# Patient Record
Sex: FEMALE | Race: OTHER | Employment: UNEMPLOYED | URBAN - METROPOLITAN AREA
[De-identification: names, ages, dates, MRNs, and addresses within clinical notes are randomized per-mention and may not be internally consistent; named-entity substitution may affect disease eponyms.]

---

## 2018-11-02 ENCOUNTER — OFFICE VISIT (OUTPATIENT)
Dept: FAMILY MEDICINE CLINIC | Age: 33
End: 2018-11-02
Payer: COMMERCIAL

## 2018-11-02 VITALS
OXYGEN SATURATION: 98 % | TEMPERATURE: 98.3 F | BODY MASS INDEX: 26.31 KG/M2 | HEIGHT: 60 IN | RESPIRATION RATE: 16 BRPM | HEART RATE: 96 BPM | WEIGHT: 134 LBS | SYSTOLIC BLOOD PRESSURE: 118 MMHG | DIASTOLIC BLOOD PRESSURE: 72 MMHG

## 2018-11-02 DIAGNOSIS — M79.7 FIBROMYALGIA: Primary | ICD-10-CM

## 2018-11-02 DIAGNOSIS — Z3A.16 16 WEEKS GESTATION OF PREGNANCY: ICD-10-CM

## 2018-11-02 DIAGNOSIS — D21.9 FIBROMYOMA: Primary | ICD-10-CM

## 2018-11-02 DIAGNOSIS — F41.8 DEPRESSION WITH ANXIETY: ICD-10-CM

## 2018-11-02 DIAGNOSIS — M79.10 MYALGIA: ICD-10-CM

## 2018-11-02 LAB
BASOPHILS ABSOLUTE: 0 K/UL (ref 0–0.2)
BASOPHILS RELATIVE PERCENT: 0.2 %
EOSINOPHILS ABSOLUTE: 0.1 K/UL (ref 0–0.6)
EOSINOPHILS RELATIVE PERCENT: 1 %
HCT VFR BLD CALC: 36 % (ref 36–48)
HEMOGLOBIN: 12.2 G/DL (ref 12–16)
LYMPHOCYTES ABSOLUTE: 1.2 K/UL (ref 1–5.1)
LYMPHOCYTES RELATIVE PERCENT: 14.5 %
MCH RBC QN AUTO: 32.6 PG (ref 26–34)
MCHC RBC AUTO-ENTMCNC: 34 G/DL (ref 31–36)
MCV RBC AUTO: 95.7 FL (ref 80–100)
MONOCYTES ABSOLUTE: 0.5 K/UL (ref 0–1.3)
MONOCYTES RELATIVE PERCENT: 5.7 %
NEUTROPHILS ABSOLUTE: 6.3 K/UL (ref 1.7–7.7)
NEUTROPHILS RELATIVE PERCENT: 78.6 %
PDW BLD-RTO: 13 % (ref 12.4–15.4)
PLATELET # BLD: 253 K/UL (ref 135–450)
PMV BLD AUTO: 9.1 FL (ref 5–10.5)
RBC # BLD: 3.76 M/UL (ref 4–5.2)
SEDIMENTATION RATE, ERYTHROCYTE: 42 MM/HR (ref 0–20)
TOTAL CK: 102 U/L (ref 26–192)
WBC # BLD: 8 K/UL (ref 4–11)

## 2018-11-02 PROCEDURE — 99214 OFFICE O/P EST MOD 30 MIN: CPT | Performed by: FAMILY MEDICINE

## 2018-11-02 RX ORDER — FAMCICLOVIR 500 MG/1
TABLET, FILM COATED ORAL
Refills: 3 | COMMUNITY
Start: 2018-10-10 | End: 2019-01-07 | Stop reason: SDUPTHER

## 2018-11-02 ASSESSMENT — PATIENT HEALTH QUESTIONNAIRE - PHQ9
SUM OF ALL RESPONSES TO PHQ9 QUESTIONS 1 & 2: 0
SUM OF ALL RESPONSES TO PHQ QUESTIONS 1-9: 0
SUM OF ALL RESPONSES TO PHQ QUESTIONS 1-9: 0
1. LITTLE INTEREST OR PLEASURE IN DOING THINGS: 0
2. FEELING DOWN, DEPRESSED OR HOPELESS: 0

## 2018-11-02 NOTE — PROGRESS NOTES
no homicidal plans. Nursing note and vitals reviewed. Assessment:       Diagnosis Orders   1. Fibromyalgia  PT aquatic therapy    [facility]    CBC Auto Differential    Sedimentation rate, automated    CK   2. 16 weeks gestation of pregnancy     3. Depression with anxiety  [facility]   4. Myalgia  CK           Plan:      1. New patient,   History of poor response to cymbalta  Pt is 16 weeks pregnant  Referral to PT and rheumatology  Get labs    2. Continue fu with OB    3.  Declines medical treatment , referral to psycholog    Declines flu vaccine          Vale Dumas MD

## 2018-11-05 ASSESSMENT — ENCOUNTER SYMPTOMS
NAUSEA: 0
CHANGE IN BOWEL HABIT: 0
COUGH: 0
VISUAL CHANGE: 0
SORE THROAT: 0
SWOLLEN GLANDS: 0
VOMITING: 0
ABDOMINAL PAIN: 0

## 2019-01-04 ENCOUNTER — PATIENT MESSAGE (OUTPATIENT)
Dept: FAMILY MEDICINE CLINIC | Age: 34
End: 2019-01-04

## 2019-01-07 RX ORDER — FAMCICLOVIR 500 MG/1
TABLET, FILM COATED ORAL
Qty: 9 TABLET | Refills: 1 | Status: SHIPPED | OUTPATIENT
Start: 2019-01-07 | End: 2019-01-08 | Stop reason: SDUPTHER

## 2019-01-08 RX ORDER — FAMCICLOVIR 500 MG/1
TABLET, FILM COATED ORAL
Qty: 9 TABLET | Refills: 1 | Status: SHIPPED | OUTPATIENT
Start: 2019-01-08 | End: 2019-06-24

## 2019-06-10 ENCOUNTER — OFFICE VISIT (OUTPATIENT)
Dept: FAMILY MEDICINE CLINIC | Age: 34
End: 2019-06-10
Payer: COMMERCIAL

## 2019-06-10 VITALS
BODY MASS INDEX: 27.8 KG/M2 | WEIGHT: 141.6 LBS | TEMPERATURE: 97.6 F | RESPIRATION RATE: 16 BRPM | DIASTOLIC BLOOD PRESSURE: 80 MMHG | SYSTOLIC BLOOD PRESSURE: 148 MMHG | HEIGHT: 60 IN | OXYGEN SATURATION: 98 % | HEART RATE: 68 BPM

## 2019-06-10 DIAGNOSIS — G89.29 CHRONIC PELVIC PAIN IN FEMALE: ICD-10-CM

## 2019-06-10 DIAGNOSIS — R10.2 CHRONIC PELVIC PAIN IN FEMALE: ICD-10-CM

## 2019-06-10 DIAGNOSIS — R60.0 EDEMA OF BOTH LEGS: ICD-10-CM

## 2019-06-10 DIAGNOSIS — M79.7 FIBROMYALGIA: ICD-10-CM

## 2019-06-10 DIAGNOSIS — I10 BENIGN ESSENTIAL HTN: Primary | ICD-10-CM

## 2019-06-10 DIAGNOSIS — F41.8 DEPRESSION WITH ANXIETY: ICD-10-CM

## 2019-06-10 PROCEDURE — 99214 OFFICE O/P EST MOD 30 MIN: CPT | Performed by: FAMILY MEDICINE

## 2019-06-10 RX ORDER — HYDROCHLOROTHIAZIDE 25 MG/1
25 TABLET ORAL DAILY
Qty: 90 TABLET | Refills: 1 | Status: SHIPPED | OUTPATIENT
Start: 2019-06-10 | End: 2021-04-30

## 2019-06-10 RX ORDER — ACYCLOVIR 400 MG/1
TABLET ORAL
Refills: 2 | COMMUNITY
Start: 2019-04-15 | End: 2020-06-22 | Stop reason: SDUPTHER

## 2019-06-10 ASSESSMENT — PATIENT HEALTH QUESTIONNAIRE - PHQ9
1. LITTLE INTEREST OR PLEASURE IN DOING THINGS: 0
2. FEELING DOWN, DEPRESSED OR HOPELESS: 0
SUM OF ALL RESPONSES TO PHQ QUESTIONS 1-9: 0
SUM OF ALL RESPONSES TO PHQ QUESTIONS 1-9: 0
SUM OF ALL RESPONSES TO PHQ9 QUESTIONS 1 & 2: 0

## 2019-06-10 ASSESSMENT — ENCOUNTER SYMPTOMS
SHORTNESS OF BREATH: 0
ORTHOPNEA: 0
CHEST TIGHTNESS: 0
BLURRED VISION: 0
COLOR CHANGE: 0
BACK PAIN: 1

## 2019-06-10 NOTE — PROGRESS NOTES
Subjective:      Patient ID: Brian Ignacio is a 26 y.o. female. Here for fu  Dx on Nov with pre eclampsia, was tx with nifedipine and was dc few weeks ago, in her last apt her bp was\"high\"    . Hypertension   This is a new problem. The current episode started more than 1 month ago. The problem has been waxing and waning since onset. The problem is uncontrolled. Associated symptoms include anxiety, malaise/fatigue and peripheral edema. Pertinent negatives include no blurred vision, chest pain, headaches, neck pain, orthopnea, palpitations, PND, shortness of breath or sweats. There are no associated agents to hypertension. Past treatments include nothing. Fibromyalgia  Her pain is worse from waist down,   Constant  Has apt with rheumatology later this month    Depression w anxiety  Depressed mood is not so \"severe\" feels more stress with hre 3month old baby  \"no sleep\", \"I feel tired\", but denies si/hi  Good social support    Edema  Noticed since November, her sx had not resolved after delivering her daughter 2 months ago. Edema is worse in afternoon, worse w nothing, better with nothing  No redness but some times \"hurt\",   No sob, cp, palpitation, pnd or orthopnea. Pelvic pain  Was told by gyn to fu with us  Denies urinary sx  Onset months ago    Review of Systems   Constitutional: Positive for activity change, fatigue and malaise/fatigue. Negative for appetite change and unexpected weight change. Eyes: Negative for blurred vision. Respiratory: Negative for chest tightness and shortness of breath. Cardiovascular: Positive for leg swelling. Negative for chest pain, palpitations, orthopnea and PND. Musculoskeletal: Positive for back pain and myalgias. Negative for gait problem and neck pain. Skin: Negative for color change and pallor. Neurological: Negative for headaches. Psychiatric/Behavioral: Positive for dysphoric mood. Negative for behavioral problems and sleep disturbance.  The patient is nervous/anxious. has No Known Allergies. Current Outpatient Medications:     hydrochlorothiazide (HYDRODIURIL) 25 MG tablet, Take 1 tablet by mouth daily, Disp: 90 tablet, Rfl: 1    acyclovir (ZOVIRAX) 400 MG tablet, TAKE 1 TABLET (400 MG) BY ORAL ROUTE 2 TIMES PER DAY FOR 30 DAYS, Disp: , Rfl: 2    famciclovir (FAMVIR) 500 MG tablet, TAKE 3 TABS BY MOUTH ONCE FOR 1 DOSE., Disp: 9 tablet, Rfl: 1     has a past medical history of Depression, Fibromyalgia, and Preeclampsia. No past surgical history on file. reports that she has never smoked. She has never used smokeless tobacco.    family history is not sig       Objective:  Blood pressure (!) 148/80, pulse 68, temperature 97.6 °F (36.4 °C), temperature source Tympanic, resp. rate 16, height 5' (1.524 m), weight 141 lb 9.6 oz (64.2 kg), last menstrual period 07/09/2018, SpO2 98 %, not currently breastfeeding. Physical Exam   Constitutional: She is oriented to person, place, and time. She appears well-developed and well-nourished. No distress. HENT:   Head: Normocephalic. Mouth/Throat: Oropharynx is clear and moist.   Eyes: Pupils are equal, round, and reactive to light. Conjunctivae and EOM are normal. No scleral icterus. Neck: Normal range of motion. Neck supple. No JVD present. No thyromegaly present. No carotid bruits   Cardiovascular: Normal rate, regular rhythm, normal heart sounds and intact distal pulses. Exam reveals no gallop and no friction rub. No murmur heard. Pulses:       Carotid pulses are 2+ on the right side, and 2+ on the left side. No edema     Pulmonary/Chest: Effort normal and breath sounds normal. No respiratory distress. She has no wheezes. She has no rales. She exhibits no tenderness. Abdominal: Soft. Bowel sounds are normal. She exhibits no mass. There is no tenderness. Musculoskeletal: She exhibits no edema. Lymphadenopathy:     She has no cervical adenopathy.    Neurological: She is alert and oriented to person, place, and time. She has normal reflexes. No cranial nerve deficit. She exhibits normal muscle tone. Skin: Skin is warm. Capillary refill takes less than 2 seconds. She is not diaphoretic. Psychiatric: She has a normal mood and affect. Her behavior is normal. Thought content normal.   Nursing note and vitals reviewed. Assessment:       Diagnosis Orders   1. Benign essential HTN  hydrochlorothiazide (HYDRODIURIL) 25 MG tablet   2. Fibromyalgia  Therapuetic recreation aquatics   3. Depression with anxiety     4. Edema of both legs  hydrochlorothiazide (HYDRODIURIL) 25 MG tablet   5. Chronic pelvic pain in female             Plan:        1. New  Hctz.   encourage low sodium diet  Elevate legs  Fu 1 mo. Labs in 1 mo.    2. Unchanged  Aleve bid  Fu rheumatology  Aqua therapy    4. See # 1     5. Fu 1 mo. Kenia received counseling on the following healthy behaviors: nutrition, exercise and medication adherence    Patient given educational materials on Nutrition, Exercise and Hypertension    I have instructed Kenia to complete a self tracking handout on Weights and instructed them to bring it with them to her next appointment. Discussed use, benefit, and side effects of prescribed medications. Barriers to medication compliance addressed. All patient questions answered. Pt voiced understanding.            Ina Wilkes MD

## 2019-06-24 ENCOUNTER — OFFICE VISIT (OUTPATIENT)
Dept: RHEUMATOLOGY | Age: 34
End: 2019-06-24
Payer: COMMERCIAL

## 2019-06-24 VITALS
HEART RATE: 91 BPM | SYSTOLIC BLOOD PRESSURE: 125 MMHG | BODY MASS INDEX: 27.29 KG/M2 | HEIGHT: 60 IN | WEIGHT: 139 LBS | DIASTOLIC BLOOD PRESSURE: 80 MMHG

## 2019-06-24 DIAGNOSIS — M79.7 FIBROMYALGIA: ICD-10-CM

## 2019-06-24 DIAGNOSIS — M79.7 FIBROMYALGIA: Primary | ICD-10-CM

## 2019-06-24 LAB
A/G RATIO: 1.4 (ref 1.1–2.2)
ALBUMIN SERPL-MCNC: 4.6 G/DL (ref 3.4–5)
ALP BLD-CCNC: 72 U/L (ref 40–129)
ALT SERPL-CCNC: 22 U/L (ref 10–40)
ANION GAP SERPL CALCULATED.3IONS-SCNC: 13 MMOL/L (ref 3–16)
AST SERPL-CCNC: 23 U/L (ref 15–37)
BASOPHILS ABSOLUTE: 0 K/UL (ref 0–0.2)
BASOPHILS RELATIVE PERCENT: 0.7 %
BILIRUB SERPL-MCNC: 0.3 MG/DL (ref 0–1)
BUN BLDV-MCNC: 18 MG/DL (ref 7–20)
C-REACTIVE PROTEIN: 0.5 MG/L (ref 0–5.1)
CALCIUM SERPL-MCNC: 10 MG/DL (ref 8.3–10.6)
CHLORIDE BLD-SCNC: 101 MMOL/L (ref 99–110)
CO2: 26 MMOL/L (ref 21–32)
CREAT SERPL-MCNC: 0.9 MG/DL (ref 0.6–1.1)
EOSINOPHILS ABSOLUTE: 0.3 K/UL (ref 0–0.6)
EOSINOPHILS RELATIVE PERCENT: 4.9 %
GFR AFRICAN AMERICAN: >60
GFR NON-AFRICAN AMERICAN: >60
GLOBULIN: 3.2 G/DL
GLUCOSE BLD-MCNC: 88 MG/DL (ref 70–99)
HCT VFR BLD CALC: 36.8 % (ref 36–48)
HEMOGLOBIN: 12.1 G/DL (ref 12–16)
LYMPHOCYTES ABSOLUTE: 1.5 K/UL (ref 1–5.1)
LYMPHOCYTES RELATIVE PERCENT: 22.6 %
MCH RBC QN AUTO: 28.7 PG (ref 26–34)
MCHC RBC AUTO-ENTMCNC: 32.7 G/DL (ref 31–36)
MCV RBC AUTO: 87.5 FL (ref 80–100)
MONOCYTES ABSOLUTE: 0.5 K/UL (ref 0–1.3)
MONOCYTES RELATIVE PERCENT: 7 %
NEUTROPHILS ABSOLUTE: 4.4 K/UL (ref 1.7–7.7)
NEUTROPHILS RELATIVE PERCENT: 64.8 %
PDW BLD-RTO: 14.4 % (ref 12.4–15.4)
PLATELET # BLD: 299 K/UL (ref 135–450)
PMV BLD AUTO: 8.9 FL (ref 5–10.5)
POTASSIUM SERPL-SCNC: 4.2 MMOL/L (ref 3.5–5.1)
RBC # BLD: 4.21 M/UL (ref 4–5.2)
RHEUMATOID FACTOR: <10 IU/ML
SEDIMENTATION RATE, ERYTHROCYTE: 14 MM/HR (ref 0–20)
SODIUM BLD-SCNC: 140 MMOL/L (ref 136–145)
TOTAL CK: 110 U/L (ref 26–192)
TOTAL PROTEIN: 7.8 G/DL (ref 6.4–8.2)
TSH REFLEX FT4: 3 UIU/ML (ref 0.27–4.2)
VITAMIN B-12: 559 PG/ML (ref 211–911)
VITAMIN D 25-HYDROXY: 18.7 NG/ML
WBC # BLD: 6.8 K/UL (ref 4–11)

## 2019-06-24 PROCEDURE — 99245 OFF/OP CONSLTJ NEW/EST HI 55: CPT | Performed by: INTERNAL MEDICINE

## 2019-06-24 PROCEDURE — G8427 DOCREV CUR MEDS BY ELIG CLIN: HCPCS | Performed by: INTERNAL MEDICINE

## 2019-06-24 PROCEDURE — G8419 CALC BMI OUT NRM PARAM NOF/U: HCPCS | Performed by: INTERNAL MEDICINE

## 2019-06-24 RX ORDER — AMITRIPTYLINE HYDROCHLORIDE 10 MG/1
10 TABLET, FILM COATED ORAL DAILY
COMMUNITY
End: 2020-04-08

## 2019-06-24 RX ORDER — GABAPENTIN 300 MG/1
CAPSULE ORAL
Qty: 90 CAPSULE | Refills: 3 | Status: SHIPPED | OUTPATIENT
Start: 2019-06-24 | End: 2020-04-08 | Stop reason: SDUPTHER

## 2019-06-24 NOTE — LETTER
[facility] Rhe[facility]  [address] [address]  Phone: [phone]  Fax: [phone]    Mayte Mckoy MD        June 24, 2019     No referring provider defined for this encounter. Celestine Palomino MD  [address] [place] [address]    Patient: Crecencio Severance  MR Number: [id number]  YOB: 1985  Date of Visit: 6/24/2019    Dear Dr. Celestine Palomino:    Thank you for your referral. Progress note attached in visit summary. If you have questions, please do not hesitate to call me. I look forward to following Kenia along with you.     Sincerely,        Mayte Mckoy MD

## 2019-06-24 NOTE — PROGRESS NOTES
2019  Patient Name: Zeeshan Kapoor  : 1985  Medical Record: [id number]    MEDICATIONS  Current Outpatient Medications   Medication Sig Dispense Refill    amitriptyline (ELAVIL) 10 MG tablet Take 10 mg by mouth daily      gabapentin (NEURONTIN) 300 MG capsule Take 300 mg daily by mouth at bedtime for 4 days, then twice a day for 4 days and then 3 times a day 90 capsule 3    acyclovir (ZOVIRAX) 400 MG tablet TAKE 1 TABLET (400 MG) BY ORAL ROUTE 2 TIMES PER DAY FOR 30 DAYS  2    hydrochlorothiazide (HYDRODIURIL) 25 MG tablet Take 1 tablet by mouth daily 90 tablet 1     No current facility-administered medications for this visit. ALLERGIES  No Known Allergies      Comments  No specialty comments available. Anne Marie Trinidad MD    HISTORY OF PRESENT ILLNESS  Zeeshan Kapoor is a 26 y.o. female who is being seen for follow up evaluation of  fibromyalgia. She was diagnosed with fibromyalgia in 2016. She has chronic widespread musculoskeletal pain involving upper and lower body on both sides of the midline. Symptoms are progressively getting worse. She describes her pain is constant, 8-10 out of 10, aching burning, sharp, without any significant relieving or aggravating factors. She wakes up frequently at night due to pain. Sleep is not refreshing. She also has fatigue. She has anxiety and depression. She gave birth to a baby girl 3 months ago. She had preeclampsia during pregnancy. She does not breast-feed. She has tried tramadol in the past with some benefit. She was also on Cymbalta briefly which made her feel sad/heavy bleeding. She has not tried any other treatment modalities. She takes amitriptyline as needed but it makes her too sleepy in the morning. She denies any joint swelling. She denies any family history of lupus or rheumatoid arthritis. She has also tried ibuprofen and Tylenol without any significant benefit.   She denies psoriasis, inflammatory bowel disease, inflammatory back pain, dactylitis, enthesitis, tenosynovitis. She was referred to aquatic therapy by PCP but has not been there yet. HPI  Review of Systems    REVIEW OF SYSTEMS:   Constitutional: No unanticipated weight loss or fevers. Integumentary: No rash, photosensitivity, malar rash, livedo reticularis, alopecia and Raynaud's symptoms, sclerodactyly, skin tightening  Eyes: negative for visual disturbance and persistent redness, discharge from eyes   ENT: - No tinnitus, loss of hearing, vertigo, or recurrent ear infections.  - No history of nasal/oral ulcers. - No history of dry eyes/dry mouth  Cardiovascular: No history of pericarditis, chest pain or murmur or palpitations  Respiratory: No shortness of breath, cough or history of interstitial lung disease. No history of pleurisy. No history of tuberculosis or atypical infections. Gastrointestinal: No history of heart burn, dysphagia or esophageal dysmotility. No change in bowel habits or any inflammatory bowel disease. Genitourinary: No history renal disease, miscarriages. Hematologic/Lymphatic: No abnormal bruising or bleeding, blood clots or swollen lymph nodes. Neurological: No history of headaches, seizure or focal weakness. No history of neuropathies, paresthesias or hyperesthesias, facial droop, diplopia  Psychiatric: No history of bipolar disease, anxiety  Endocrine: Denies any polyuria, polydipsia and osteoporosis  Allergic/Immunologic: No nasal congestion or hives. I have reviewed patients Past medical History, Social History and Family History as mentioned in her chart and this remains unchanged fromprevious. Past Medical History:   Diagnosis Date    Depression     Fibromyalgia     Preeclampsia      History reviewed. No pertinent surgical history.   Social History     Socioeconomic History    Marital status: Single     Spouse name: Not on file    Number of children: Not on file    Years of education: Not on file  Highest education level: Not on file   Occupational History    Not on file   Social Needs    Financial resource strain: Not on file    Food insecurity:     Worry: Not on file     Inability: Not on file    Transportation needs:     Medical: Not on file     Non-medical: Not on file   Tobacco Use    Smoking status: Never Smoker    Smokeless tobacco: Never Used   Substance and Sexual Activity    Alcohol use: Not on file    Drug use: Not on file    Sexual activity: Yes     Partners: Male   Lifestyle    Physical activity:     Days per week: Not on file     Minutes per session: Not on file    Stress: Not on file   Relationships    Social connections:     Talks on phone: Not on file     Gets together: Not on file     Attends [employer] service: Not on file     Active member of club or organization: Not on file     Attends meetings of clubs or organizations: Not on file     Relationship status: Not on file    Intimate partner violence:     Fear of current or ex partner: Not on file     Emotionally abused: Not on file     Physically abused: Not on file     Forced sexual activity: Not on file   Other Topics Concern    Not on file   Social History Narrative    Not on file     Family History   Problem Relation Age of Onset    Anemia Mother     Heart Disease Father          PHYSICAL EXAM   Vitals:    06/24/19 0947   BP: 125/80   Site: Right Lower Arm   Pulse: 91   Weight: 139 lb (63 kg)   Height: 5' (1.524 m)     Physical Exam  Constitutional:  Well developed, well nourished, no acute distress, non-toxic appearance   Musculoskeletal:                                          Right            Left                                   Tender Tender    Costochondral  + +   Low cervical + +   suboccipital + +   Trapezius  + +   Supraspinatus  + +   Lateral epicondyl + +   Gluteal + +   Greater trochanter  + +   knees + +     Ambulates without assistance, normal gait  Neck: Full ROM, no tenderness,supple   Back- no management, physical conditioning, sleep hygiene, weight loss, coordination of care     The patient indicates understanding of these issues and agrees with the plan. Return in about 6 weeks (around 8/5/2019). The risks and benefits of my recommendations, as well as other treatment options, benefits and side effects werediscussed with the patient. All questions were answered. I reviewed patient's history, referral documents and electronic medical records  Copy of consult note is being routedelectronically/faxed to referring physician         ######################################################################    I thank you for giving me theopportunity to participate in [facility]. If you have any questions or concerns please feel free to contact me. I look forward to following  Kenia along with you. Electronically signed by: Gurpreet Bahena MD, 6/24/2019 10:20 AM    Documentation was done using voice recognition dragon software. Every effort was made to ensure accuracy;however, inadvertent unintentional computerized transcription errors may be present.

## 2019-06-24 NOTE — PATIENT INSTRUCTIONS
Fibromyalgia - a guide for patients:  ?  Fibromyalgia is a common health problem that causes widespread pain and tenderness (sensitive to touch). The pain and tenderness tend to come and go, and move about the body. Most often, people with this chronic (long-term) illness are fatigued (very tired) and have sleep problems. It can be hard to diagnose fibromyalgia.  o Fibromyalgia affects two to four percent of people, mostly women. o Doctors diagnose fibromyalgia based on all the patient's relevant symptoms (what you feel), no longer just on the number of tender points. o There is no test to detect this disease, but you may need lab tests or X-rays to rule out other health problems. o Though there is no cure, medications can relieve symptoms. o Patients also may feel better with proper self-care, such as exercise and getting enough sleep. The U.S. Food and Drug Administration has approved three drugs for the treatment of fibromyalgia. They include two drugs that change some of the brain chemicals (serotonin and norepinephrine) that help control pain levels: duloxetine (Cymbalta) and milnacipran (Savella). Older drugs that affect these same brain chemicals also may be used to treat fibromyalgia. These include amitriptyline (Elavil), cyclobenzaprine (Flexeril) or venlafaxine (Effexor). The other drug approved for fibromyalgia is pregabalin (Lyrica). Pregabalin and another drug, gabapentin (Neurontin), work by blocking the overactivity of nerve cells involved in pain transmission. These medicines may cause dizziness, sleepiness, swelling and weight gain. Self-care tips.  o Make time to relax each day. Deep-breathing exercises and meditation will help reduce the stress that can bring on symptoms. o Set a regular sleep pattern. Go to bed and wake up at the same time each day. Getting enough sleep lets your body repair itself, physically and mentally.  Also, avoid daytime napping and limit caffeine intake, [id number] [facility]. [facility]   [address] [facility]. [facility]     Sleep Hygiene:  What is Sleep Hygiene? Sleep hygiene is the term used to describe good sleep habits. Considerable research has gone into developing a set of  guidelines and tips which are designed to enhance good  sleeping, and there is much evidence to suggest that these  strategies can provide long-term solutions to sleep difficulties. There are many medications which are used to treat insomnia,  but these tend to be only effective in the short-term. Ongoing  use of sleeping pills may lead to dependence and interfere  with developing good sleep habits independent of medication,  thereby prolonging sleep difficulties. Talk to your health  professional about what is right for you, but we recommend  good sleep hygiene as an important part of treating insomnia,  either with other strategies such as medication or cognitive  therapy or alone. Sleep Hygiene Tips  1) Get regular. One of the best ways to train your body to  sleep well is to go to bed and get up at more or less the  same time every day, even on weekends and days off! This  regular rhythm will make you feel better and will give your  body something to work from. 2) Sleep when sleepy. Only try to sleep when you actually  feel tired or sleepy, rather than spending too much time  awake in bed. 3) Get up & try again. If you havent been able to get to  sleep after about 20 minutes or more, get up and do  something calming or boring until you feel sleepy, then  return to bed and try again. Sit quietly on the couch with  the lights off (bright light will tell your brain that it is time  to wake up), or read something boring like the phone  book. Avoid doing anything that is too stimulating or  interesting, as this will wake you up even more. 4) Avoid caffeine & nicotine.  It is best to avoid consuming  any caffeine you have the right facts about your sleep, rather  than making assumptions. Because a diary involves watching  the clock (see point 10) it is a good idea to only use it for  two weeks to get an idea of what is going and then  perhaps two months down the track to see how you  are progressing. 12) Exercise. Regular exercise is a good idea to  help with good sleep, but try not to do strenuous  exercise in the 4 hours before bedtime. Morning  walks are a great way to start the day feeling refreshed!  13) Eat right. A healthy, balanced diet will help you to sleep  well, but timing is important. Some people find that a very  empty stomach at bedtime is distracting, so it can be useful  to have a light snack, but a heavy meal soon before bed can  also interrupt sleep. Some people recommend a warm glass  of milk, which contains tryptophan, which acts as a natural  sleep inducer. 14) The right space. It is very important that your bed and  bedroom are quiet and comfortable for sleeping. A cooler  room with enough blankets to stay warm is best, and make  sure you have curtains or an eyemask to block out early  morning light and earplugs if there is noise outside your  room. 15) Keep daytime routine the same. Even if you have a bad  night sleep and are tired it is important that you try to keep  your daytime activities the same as you had planned. That is,  dont avoid activities because you feel tired. This can  reinforce the insomnia. 16) Avoid screen time before bed. Exposure to computer screens  cell phones, and television can keep your body from thinking  it's time to sleep; the wavelength of light from these screens is the   same wavelength as a sunrise, so it sends a message to the body that  it's time to WAKE UP rather than time to fall asleep. For an easier  time falling asleep and for more effective sleep, avoid looking at   screens for the 3-4 hour before bedtime.  If you do look at a screen,  you will want to wear a pair of \"blue blocker\" sunglasses, which  can be purchased from [employer].     Other Resources:    Justino Thomas's Online fibroguide   Justino Thomas's you tube video on fibromyalgia     -Strongly emphasized on low impact aerobic and stretching exercises including biking, swimming, walking, yoga or tiachi classes  -deep breathing and relaxation exercises   -mindfulness techniques  -Counseled on Sleep hygiene   -Advised to drink 64 oz of water   -Limit caffeine intake to 8 oz/day     Start gabapentin   Water therapy

## 2019-06-25 ENCOUNTER — TELEPHONE (OUTPATIENT)
Dept: RHEUMATOLOGY | Age: 34
End: 2019-06-25

## 2019-06-25 ENCOUNTER — TELEPHONE (OUTPATIENT)
Dept: INTERNAL MEDICINE CLINIC | Age: 34
End: 2019-06-25

## 2019-06-25 DIAGNOSIS — E55.9 VITAMIN D DEFICIENCY: Primary | ICD-10-CM

## 2019-06-25 LAB
ANTI-NUCLEAR ANTIBODY (ANA): NEGATIVE
CYCLIC CITRULLINATED PEPTIDE ANTIBODY IGG: 0.7 U/ML (ref 0–2.9)

## 2019-06-25 RX ORDER — CHOLECALCIFEROL (VITAMIN D3) 50 MCG
2000 TABLET ORAL DAILY
Qty: 30 TABLET | Refills: 11 | Status: SHIPPED | OUTPATIENT
Start: 2019-06-25 | End: 2019-06-26 | Stop reason: SDUPTHER

## 2019-06-25 RX ORDER — ERGOCALCIFEROL 1.25 MG/1
50000 CAPSULE ORAL WEEKLY
Qty: 4 CAPSULE | Refills: 2 | Status: SHIPPED | OUTPATIENT
Start: 2019-06-25 | End: 2019-06-26 | Stop reason: SDUPTHER

## 2019-06-25 NOTE — TELEPHONE ENCOUNTER
Patient has to take vitamin D2 50,000 international units once a week for 12 weeks and then vitamin D3 2000 international units daily

## 2019-06-25 NOTE — TELEPHONE ENCOUNTER
Notes recorded by Mlii Tang on 6/25/2019 at 1:12 PM EDT  Nicky  ------    Notes recorded by Doreen Rodriguez MD on 6/25/2019 at 12:41 PM EDT  Please call the patient and let Her know She   is deficient in vitamin d and script has been sent to the pharmacy   Other labs are normal

## 2019-06-25 NOTE — TELEPHONE ENCOUNTER
Notes recorded by Kal Lorenzo on 6/25/2019 at 1:12 PM EDT  Nicky  ------    Notes recorded by Ulysses Peacock, MD on 6/25/2019 at 12:41 PM EDT  Please call the patient and let Her know She   is deficient in vitamin d and script has been sent to the pharmacy   Other labs are normal

## 2019-06-26 DIAGNOSIS — E55.9 VITAMIN D DEFICIENCY: ICD-10-CM

## 2019-06-26 RX ORDER — CHOLECALCIFEROL (VITAMIN D3) 50 MCG
2000 TABLET ORAL DAILY
Qty: 30 TABLET | Refills: 0 | Status: CANCELLED | OUTPATIENT
Start: 2019-09-18 | End: 2019-10-18

## 2019-06-26 RX ORDER — CHOLECALCIFEROL (VITAMIN D3) 50 MCG
2000 TABLET ORAL DAILY
Qty: 90 TABLET | Refills: 3 | Status: SHIPPED | OUTPATIENT
Start: 2019-09-24 | End: 2020-07-14

## 2019-06-26 RX ORDER — ERGOCALCIFEROL 1.25 MG/1
50000 CAPSULE ORAL WEEKLY
Qty: 12 CAPSULE | Refills: 0 | Status: SHIPPED | OUTPATIENT
Start: 2019-06-26 | End: 2020-04-08

## 2019-06-26 RX ORDER — ERGOCALCIFEROL 1.25 MG/1
50000 CAPSULE ORAL WEEKLY
Qty: 4 CAPSULE | Refills: 2 | Status: CANCELLED | OUTPATIENT
Start: 2019-06-26

## 2019-07-08 ENCOUNTER — OFFICE VISIT (OUTPATIENT)
Dept: FAMILY MEDICINE CLINIC | Age: 34
End: 2019-07-08
Payer: COMMERCIAL

## 2019-07-08 VITALS
SYSTOLIC BLOOD PRESSURE: 112 MMHG | BODY MASS INDEX: 28.41 KG/M2 | HEIGHT: 60 IN | DIASTOLIC BLOOD PRESSURE: 74 MMHG | RESPIRATION RATE: 16 BRPM | TEMPERATURE: 98.4 F | WEIGHT: 144.7 LBS | HEART RATE: 98 BPM | OXYGEN SATURATION: 98 %

## 2019-07-08 DIAGNOSIS — H93.8X2 EAR FULLNESS, LEFT: ICD-10-CM

## 2019-07-08 DIAGNOSIS — R60.0 EDEMA OF BOTH LEGS: ICD-10-CM

## 2019-07-08 DIAGNOSIS — I10 BENIGN ESSENTIAL HTN: Primary | ICD-10-CM

## 2019-07-08 DIAGNOSIS — M79.7 FIBROMYALGIA: ICD-10-CM

## 2019-07-08 PROCEDURE — G8419 CALC BMI OUT NRM PARAM NOF/U: HCPCS | Performed by: FAMILY MEDICINE

## 2019-07-08 PROCEDURE — G8427 DOCREV CUR MEDS BY ELIG CLIN: HCPCS | Performed by: FAMILY MEDICINE

## 2019-07-08 PROCEDURE — 99214 OFFICE O/P EST MOD 30 MIN: CPT | Performed by: FAMILY MEDICINE

## 2019-07-08 PROCEDURE — 1036F TOBACCO NON-USER: CPT | Performed by: FAMILY MEDICINE

## 2019-07-09 ASSESSMENT — ENCOUNTER SYMPTOMS
SHORTNESS OF BREATH: 0
SWOLLEN GLANDS: 0
SORE THROAT: 0
BLURRED VISION: 0
ORTHOPNEA: 0
CHEST TIGHTNESS: 0

## 2019-07-24 ENCOUNTER — OFFICE VISIT (OUTPATIENT)
Dept: FAMILY MEDICINE CLINIC | Age: 34
End: 2019-07-24
Payer: COMMERCIAL

## 2019-07-24 VITALS
OXYGEN SATURATION: 99 % | RESPIRATION RATE: 16 BRPM | HEIGHT: 60 IN | DIASTOLIC BLOOD PRESSURE: 72 MMHG | WEIGHT: 140 LBS | HEART RATE: 99 BPM | SYSTOLIC BLOOD PRESSURE: 110 MMHG | BODY MASS INDEX: 27.48 KG/M2 | TEMPERATURE: 97.6 F

## 2019-07-24 DIAGNOSIS — H61.22 HEARING LOSS OF LEFT EAR DUE TO CERUMEN IMPACTION: ICD-10-CM

## 2019-07-24 DIAGNOSIS — H93.8X2 SENSATION OF FULLNESS IN LEFT EAR: Primary | ICD-10-CM

## 2019-07-24 PROCEDURE — 1036F TOBACCO NON-USER: CPT | Performed by: FAMILY MEDICINE

## 2019-07-24 PROCEDURE — 99213 OFFICE O/P EST LOW 20 MIN: CPT | Performed by: FAMILY MEDICINE

## 2019-07-24 PROCEDURE — G8427 DOCREV CUR MEDS BY ELIG CLIN: HCPCS | Performed by: FAMILY MEDICINE

## 2019-07-24 PROCEDURE — G8419 CALC BMI OUT NRM PARAM NOF/U: HCPCS | Performed by: FAMILY MEDICINE

## 2019-07-24 ASSESSMENT — ENCOUNTER SYMPTOMS
COUGH: 0
SORE THROAT: 0
RHINORRHEA: 0
ABDOMINAL PAIN: 0
EYE ITCHING: 0

## 2020-04-08 ENCOUNTER — VIRTUAL VISIT (OUTPATIENT)
Dept: RHEUMATOLOGY | Age: 35
End: 2020-04-08
Payer: MEDICAID

## 2020-04-08 PROCEDURE — 99214 OFFICE O/P EST MOD 30 MIN: CPT | Performed by: INTERNAL MEDICINE

## 2020-04-08 RX ORDER — GABAPENTIN 300 MG/1
CAPSULE ORAL
Qty: 120 CAPSULE | Refills: 3 | Status: SHIPPED | OUTPATIENT
Start: 2020-04-08 | End: 2020-04-24 | Stop reason: SDUPTHER

## 2020-04-08 RX ORDER — GABAPENTIN 300 MG/1
CAPSULE ORAL
Qty: 120 CAPSULE | Refills: 3 | Status: SHIPPED | OUTPATIENT
Start: 2020-04-08 | End: 2020-04-08 | Stop reason: SDUPTHER

## 2020-04-08 RX ORDER — LEVONORGESTREL AND ETHINYL ESTRADIOL 0.1-0.02MG
1 KIT ORAL DAILY
COMMUNITY
End: 2022-03-07

## 2020-04-08 NOTE — PATIENT INSTRUCTIONS
-Strongly emphasized on low impact aerobic and stretching exercises including biking, swimming, walking, yoga or tiachi classes  -deep breathing and relaxation exercises   -mindfulness techniques  -Counseled on Sleep hygiene   -Advised to drink 64 oz of water   -Limit caffeine intake to 8 oz/day   Increase gabapentin to 600 mg twice a day   Water therapy

## 2020-04-08 NOTE — PROGRESS NOTES
infections. She could not tolerate Cymbalta. HPI  Review of Systems    REVIEW OF SYSTEMS:   Constitutional: No unanticipated weight loss or fevers. Integumentary: No rash, photosensitivity, malar rash, livedo reticularis, alopecia and Raynaud's symptoms, sclerodactyly, skin tightening  Eyes: negative for visual disturbance and persistent redness, discharge from eyes   ENT: - No tinnitus, loss of hearing, vertigo, or recurrent ear infections.  - No history of nasal/oral ulcers. - No history of dry eyes/dry mouth  Cardiovascular: No history of pericarditis, chest pain or murmur or palpitations  Respiratory: No shortness of breath, cough or history of interstitial lung disease. No history of pleurisy. No history of tuberculosis or atypical infections. Gastrointestinal: No history of heart burn, dysphagia or esophageal dysmotility. No change in bowel habits or any inflammatory bowel disease. Genitourinary: No history renal disease, miscarriages. Hematologic/Lymphatic: No abnormal bruising or bleeding, blood clots or swollen lymph nodes. Neurological: No history of headaches, seizure or focal weakness. No history of neuropathies, paresthesias or hyperesthesias, facial droop, diplopia  Psychiatric: No history of bipolar disease, anxiety  Endocrine: Denies any polyuria, polydipsia and osteoporosis  Allergic/Immunologic: No nasal congestion or hives. I have reviewed patients Past medical History, Social History and Family History as mentioned in her chart and this remains unchanged fromprevious. Past Medical History:   Diagnosis Date    Depression     Fibromyalgia     Preeclampsia      History reviewed. No pertinent surgical history.   Social History     Socioeconomic History    Marital status: Single     Spouse name: Not on file    Number of children: Not on file    Years of education: Not on file    Highest education level: Not on file   Occupational History    Not on file   Social Needs    External Ears [x] Normal  [] Abnormal-     Neck: [x] No visualized mass     Pulmonary/Chest: [x] Respiratory effort normal.  [x] No visualized signs of difficulty breathing or respiratory distress        [] Abnormal-      Musculoskeletal:   [x] Normal gait with no signs of ataxia         [x] Normal range of motion of neck        [] Abnormal-       Neurological:        [x] No Facial Asymmetry (Cranial nerve 7 motor function) (limited exam to video visit)          [x] No gaze palsy        [] Abnormal-         Skin:        [x] No significant exanthematous lesions or discoloration noted on facial skin         [] Abnormal-            Psychiatric:       [x] Normal Affect [x] No Hallucinations        [] Abnormal-         LABS AND IMAGING  Outside data reviewed and in HPI    Lab Results   Component Value Date    WBC 6.8 06/24/2019    RBC 4.21 06/24/2019    HGB 12.1 06/24/2019    HCT 36.8 06/24/2019     06/24/2019    MCV 87.5 06/24/2019    MCH 28.7 06/24/2019    MCHC 32.7 06/24/2019    RDW 14.4 06/24/2019    LYMPHOPCT 22.6 06/24/2019    MONOPCT 7.0 06/24/2019    BASOPCT 0.7 06/24/2019    MONOSABS 0.5 06/24/2019    LYMPHSABS 1.5 06/24/2019    EOSABS 0.3 06/24/2019    BASOSABS 0.0 06/24/2019       Chemistry        Component Value Date/Time     06/24/2019 1021    K 4.2 06/24/2019 1021     06/24/2019 1021    CO2 26 06/24/2019 1021    BUN 18 06/24/2019 1021    CREATININE 0.9 06/24/2019 1021        Component Value Date/Time    CALCIUM 10.0 06/24/2019 1021    ALKPHOS 72 06/24/2019 1021    AST 23 06/24/2019 1021    ALT 22 06/24/2019 1021    BILITOT 0.3 06/24/2019 1021          Lab Results   Component Value Date    SEDRATE 14 06/24/2019     Lab Results   Component Value Date    CRP 0.5 06/24/2019     Lab Results   Component Value Date    VIRGIE Negative 06/24/2019     Lab Results   Component Value Date    RF <10.0 06/24/2019     Lab Results   Component Value Date    VIRGIE Negative 06/24/2019     No results found

## 2020-04-10 ENCOUNTER — TELEMEDICINE (OUTPATIENT)
Dept: FAMILY MEDICINE CLINIC | Age: 35
End: 2020-04-10
Payer: MEDICAID

## 2020-04-10 VITALS
BODY MASS INDEX: 29.1 KG/M2 | WEIGHT: 149 LBS | DIASTOLIC BLOOD PRESSURE: 78 MMHG | SYSTOLIC BLOOD PRESSURE: 109 MMHG | HEART RATE: 87 BPM

## 2020-04-10 PROBLEM — G89.29 CHRONIC PELVIC PAIN IN FEMALE: Status: ACTIVE | Noted: 2020-04-10

## 2020-04-10 PROBLEM — R60.9 EDEMA: Status: ACTIVE | Noted: 2020-04-10

## 2020-04-10 PROBLEM — I10 BENIGN ESSENTIAL HTN: Status: ACTIVE | Noted: 2020-04-10

## 2020-04-10 PROBLEM — R10.2 CHRONIC PELVIC PAIN IN FEMALE: Status: ACTIVE | Noted: 2020-04-10

## 2020-04-10 PROBLEM — M79.7 FIBROMYALGIA: Status: ACTIVE | Noted: 2020-04-10

## 2020-04-10 PROCEDURE — G8427 DOCREV CUR MEDS BY ELIG CLIN: HCPCS | Performed by: FAMILY MEDICINE

## 2020-04-10 PROCEDURE — G8419 CALC BMI OUT NRM PARAM NOF/U: HCPCS | Performed by: FAMILY MEDICINE

## 2020-04-10 PROCEDURE — 99214 OFFICE O/P EST MOD 30 MIN: CPT | Performed by: FAMILY MEDICINE

## 2020-04-10 PROCEDURE — 1036F TOBACCO NON-USER: CPT | Performed by: FAMILY MEDICINE

## 2020-04-10 RX ORDER — HYDROCHLOROTHIAZIDE 12.5 MG/1
12.5 CAPSULE, GELATIN COATED ORAL DAILY
Qty: 30 CAPSULE | Refills: 3 | Status: SHIPPED | OUTPATIENT
Start: 2020-04-10 | End: 2020-08-12

## 2020-04-10 ASSESSMENT — ENCOUNTER SYMPTOMS
DIARRHEA: 0
CONSTIPATION: 0
BACK PAIN: 1
ORTHOPNEA: 0
CHANGE IN BOWEL HABIT: 0
NAUSEA: 0
VOMITING: 0
COLOR CHANGE: 0
ABDOMINAL DISTENTION: 0
ABDOMINAL PAIN: 0
SHORTNESS OF BREATH: 0
BLURRED VISION: 0
RECTAL PAIN: 0

## 2020-04-10 NOTE — PROGRESS NOTES
daytime  Worse w : nothing   Better w nothing  Denies; dysuria, hematuria, vaginal discharge, fever or chills. tx tried: nothing    Pt was started on bc pills on January after delivery. No sec effects. Anxious mood with covid pandemic   Denies depressed mood. Review of Systems   Constitutional: Positive for fatigue and malaise/fatigue (chronic, unchanged). Negative for appetite change, chills, diaphoresis and fever. Eyes: Negative for blurred vision. Respiratory: Negative for shortness of breath. Cardiovascular: Negative for chest pain, palpitations, orthopnea and PND. Gastrointestinal: Negative for abdominal distention, abdominal pain, anorexia, change in bowel habit, constipation, diarrhea, nausea, rectal pain and vomiting. Genitourinary: Positive for pelvic pain. Negative for difficulty urinating, dysuria and flank pain. Musculoskeletal: Positive for arthralgias, back pain and myalgias. Negative for neck pain. Skin: Negative for color change and pallor. Neurological: Negative for weakness and headaches. Psychiatric/Behavioral: Negative for behavioral problems, dysphoric mood and sleep disturbance. The patient is nervous/anxious. has No Known Allergies.       Current Outpatient Medications:     hydrochlorothiazide (MICROZIDE) 12.5 MG capsule, Take 1 capsule by mouth daily, Disp: 30 capsule, Rfl: 3    gabapentin (NEURONTIN) 300 MG capsule, Take 600 mg twice a day, Disp: 120 capsule, Rfl: 3    levonorgestrel-ethinyl estradiol (VIENVA) 0.1-20 MG-MCG per tablet, Take 1 tablet by mouth daily, Disp: , Rfl:     Cholecalciferol (VITAMIN D) 2000 units TABS tablet, Take 1 tablet by mouth daily After finishing 12 week couse, Disp: 90 tablet, Rfl: 3    acyclovir (ZOVIRAX) 400 MG tablet, TAKE 1 TABLET (400 MG) BY ORAL ROUTE 2 TIMES PER DAY FOR 30 DAYS, Disp: , Rfl: 2    hydrochlorothiazide (HYDRODIURIL) 25 MG tablet, Take 1 tablet by mouth daily, Disp: 90 tablet, Rfl: 1     has a complete a self tracking  on Blood Pressures  and Weights and instructed them to bring it with them to her next appointment. Discussed use, benefit, and side effects of prescribed medications. Barriers to medication compliance addressed. All patient questions answered. Pt voiced understanding. Janel Hermosillo is a 37 y.o. female being evaluated by a Virtual Visit (video visit) encounter to address concerns as mentioned above. A caregiver was present when appropriate. Due to this being a TeleHealth encounter (During [facility] emergency), evaluation of the following organ systems was limited: Vitals/Constitutional/EENT/Resp/CV/GI//MS/Neuro/Skin/Heme-Lymph-Imm. Pursuant to the emergency declaration under the [address] [place] [address] authority and the [employer] and [employer] Act, this Virtual Visit was conducted with patient's (and/or legal guardian's) consent, to reduce the patient's risk of exposure to COVID-19 and provide necessary medical care. The patient (and/or legal guardian) has also been advised to contact this office for worsening conditions or problems, and seek emergency medical treatment and/or call 911 if deemed necessary. Services were provided through a video synchronous discussion virtually to substitute for in-person clinic visit. Patient and provider were located at their individual homes. --Jessika Bernard MD on 4/10/2020 at 10:25 AM    An electronic signature was used to authenticate this note.        Jessika Bernard MD

## 2020-04-24 RX ORDER — GABAPENTIN 300 MG/1
CAPSULE ORAL
Qty: 360 CAPSULE | Refills: 0 | Status: SHIPPED | OUTPATIENT
Start: 2020-04-24 | End: 2020-06-26 | Stop reason: SDUPTHER

## 2020-04-24 NOTE — TELEPHONE ENCOUNTER
Last OV 4.8.2020  Last Lab 6.24.2019 (labs ordered 4.8.2020 but not completed)     NO future appt scheduled with you. Pharmacy is wanting a 90 day supply of this medication. The new RX is pended below.

## 2020-05-08 ENCOUNTER — PATIENT MESSAGE (OUTPATIENT)
Dept: FAMILY MEDICINE CLINIC | Age: 35
End: 2020-05-08

## 2020-05-08 NOTE — TELEPHONE ENCOUNTER
From: Izabella Gomez  To: Sharlett Holter, MD  Sent: 5/8/2020 2:15 PM EDT  Subject: Visit Follow-Up Question    Andrew Galeano,  Since my previous follow up, you had advised me to send an update of my BP reading, 105/56 pulse 73. I've recently started the hydrochlorot 12.5mg and feel some improvement in my body. Less lightheadedness and pressure in my ears, also less fluctuation of my weight. Thank you, MARIOLA Torres

## 2020-06-22 ENCOUNTER — PATIENT MESSAGE (OUTPATIENT)
Dept: FAMILY MEDICINE CLINIC | Age: 35
End: 2020-06-22

## 2020-06-22 NOTE — TELEPHONE ENCOUNTER
From: Frandy Rainey  To: Thaddeus Cedeño MD  Sent: 6/22/2020 2:13 PM EDT  Subject: Prescription Question    I uploaded a photo of my previous prescription of amitriptyline. It was prescripted by my obgyn after my pregnancy.

## 2020-06-24 RX ORDER — AMITRIPTYLINE HYDROCHLORIDE 10 MG/1
10 TABLET, FILM COATED ORAL NIGHTLY
Qty: 90 TABLET | Refills: 1 | Status: SHIPPED | OUTPATIENT
Start: 2020-06-24 | End: 2020-12-07

## 2020-06-24 RX ORDER — ACYCLOVIR 400 MG/1
TABLET ORAL
Qty: 60 TABLET | Refills: 2 | Status: SHIPPED | OUTPATIENT
Start: 2020-06-24 | End: 2021-08-04 | Stop reason: SDUPTHER

## 2020-06-26 ENCOUNTER — TELEPHONE (OUTPATIENT)
Dept: RHEUMATOLOGY | Age: 35
End: 2020-06-26

## 2020-06-26 RX ORDER — GABAPENTIN 300 MG/1
CAPSULE ORAL
Qty: 210 CAPSULE | Refills: 2 | Status: SHIPPED | OUTPATIENT
Start: 2020-06-26 | End: 2020-10-29 | Stop reason: SDUPTHER

## 2020-08-07 DIAGNOSIS — M79.7 FIBROMYALGIA: ICD-10-CM

## 2020-08-07 LAB
A/G RATIO: 1.6 (ref 1.1–2.2)
ALBUMIN SERPL-MCNC: 4.6 G/DL (ref 3.4–5)
ALP BLD-CCNC: 61 U/L (ref 40–129)
ALT SERPL-CCNC: 30 U/L (ref 10–40)
ANION GAP SERPL CALCULATED.3IONS-SCNC: 13 MMOL/L (ref 3–16)
AST SERPL-CCNC: 21 U/L (ref 15–37)
BASOPHILS ABSOLUTE: 0 K/UL (ref 0–0.2)
BASOPHILS RELATIVE PERCENT: 0.4 %
BILIRUB SERPL-MCNC: 0.3 MG/DL (ref 0–1)
BUN BLDV-MCNC: 10 MG/DL (ref 7–20)
CALCIUM SERPL-MCNC: 9.4 MG/DL (ref 8.3–10.6)
CHLORIDE BLD-SCNC: 99 MMOL/L (ref 99–110)
CO2: 25 MMOL/L (ref 21–32)
CREAT SERPL-MCNC: 0.9 MG/DL (ref 0.6–1.1)
EOSINOPHILS ABSOLUTE: 0.3 K/UL (ref 0–0.6)
EOSINOPHILS RELATIVE PERCENT: 3.8 %
GFR AFRICAN AMERICAN: >60
GFR NON-AFRICAN AMERICAN: >60
GLOBULIN: 2.9 G/DL
GLUCOSE BLD-MCNC: 90 MG/DL (ref 70–99)
HCT VFR BLD CALC: 38.3 % (ref 36–48)
HEMOGLOBIN: 13 G/DL (ref 12–16)
LYMPHOCYTES ABSOLUTE: 2 K/UL (ref 1–5.1)
LYMPHOCYTES RELATIVE PERCENT: 24.2 %
MCH RBC QN AUTO: 31.8 PG (ref 26–34)
MCHC RBC AUTO-ENTMCNC: 34 G/DL (ref 31–36)
MCV RBC AUTO: 93.6 FL (ref 80–100)
MONOCYTES ABSOLUTE: 0.5 K/UL (ref 0–1.3)
MONOCYTES RELATIVE PERCENT: 5.6 %
NEUTROPHILS ABSOLUTE: 5.4 K/UL (ref 1.7–7.7)
NEUTROPHILS RELATIVE PERCENT: 66 %
PDW BLD-RTO: 12.7 % (ref 12.4–15.4)
PLATELET # BLD: 278 K/UL (ref 135–450)
PMV BLD AUTO: 9 FL (ref 5–10.5)
POTASSIUM SERPL-SCNC: 4 MMOL/L (ref 3.5–5.1)
RBC # BLD: 4.1 M/UL (ref 4–5.2)
SODIUM BLD-SCNC: 137 MMOL/L (ref 136–145)
TOTAL PROTEIN: 7.5 G/DL (ref 6.4–8.2)
WBC # BLD: 8.2 K/UL (ref 4–11)

## 2020-08-12 RX ORDER — HYDROCHLOROTHIAZIDE 12.5 MG/1
12.5 CAPSULE, GELATIN COATED ORAL DAILY
Qty: 30 CAPSULE | Refills: 5 | Status: SHIPPED | OUTPATIENT
Start: 2020-08-12 | End: 2021-02-15 | Stop reason: SDUPTHER

## 2020-08-20 ENCOUNTER — PATIENT MESSAGE (OUTPATIENT)
Dept: FAMILY MEDICINE CLINIC | Age: 35
End: 2020-08-20

## 2020-08-21 RX ORDER — ACETAMINOPHEN 500 MG
500 TABLET ORAL 4 TIMES DAILY PRN
Qty: 120 TABLET | Refills: 5 | Status: SHIPPED | OUTPATIENT
Start: 2020-08-21 | End: 2021-03-14

## 2020-08-21 RX ORDER — IBUPROFEN 600 MG/1
600 TABLET ORAL 4 TIMES DAILY PRN
Qty: 120 TABLET | Refills: 0 | Status: SHIPPED | OUTPATIENT
Start: 2020-08-21 | End: 2020-09-22

## 2020-09-22 RX ORDER — IBUPROFEN 600 MG/1
TABLET ORAL
Qty: 120 TABLET | Refills: 0 | Status: SHIPPED | OUTPATIENT
Start: 2020-09-22 | End: 2020-10-26

## 2020-10-23 RX ORDER — GABAPENTIN 300 MG/1
CAPSULE ORAL
Qty: 210 CAPSULE | Refills: 2 | OUTPATIENT
Start: 2020-10-23 | End: 2021-08-08

## 2020-10-26 RX ORDER — IBUPROFEN 600 MG/1
TABLET ORAL
Qty: 120 TABLET | Refills: 0 | Status: SHIPPED | OUTPATIENT
Start: 2020-10-26 | End: 2020-11-30

## 2020-10-29 ENCOUNTER — VIRTUAL VISIT (OUTPATIENT)
Dept: RHEUMATOLOGY | Age: 35
End: 2020-10-29
Payer: MEDICAID

## 2020-10-29 PROCEDURE — 99214 OFFICE O/P EST MOD 30 MIN: CPT | Performed by: INTERNAL MEDICINE

## 2020-10-29 PROCEDURE — G8428 CUR MEDS NOT DOCUMENT: HCPCS | Performed by: INTERNAL MEDICINE

## 2020-10-29 RX ORDER — GABAPENTIN 300 MG/1
CAPSULE ORAL
Qty: 270 CAPSULE | Refills: 5 | Status: SHIPPED | OUTPATIENT
Start: 2020-10-29 | End: 2021-04-30

## 2020-10-29 NOTE — PROGRESS NOTES
10/29/2020   Breyl Day is a 37 y.o. female being evaluated by a Virtual Visit (video visit) encounter to address concerns as mentioned above. A caregiver was present when appropriate. Due to this being a TeleHealth encounter (During [facility] public health emergency), evaluation of the following organ systems was limited: Vitals/Constitutional/EENT/Resp/CV/GI//MS/Neuro/Skin/Heme-Lymph-Imm. Pursuant to the emergency declaration under the [address] [place] [address], [address] [place] [address] and the [employer] and [employer] Act, this Virtual Visit was conducted with patient's (and/or legal guardian's) consent, to reduce the patient's risk of exposure to COVID-19 and provide necessary medical care. The patient (and/or legal guardian) has also been advised to contact this office for worsening conditions or problems, and seek emergency medical treatment and/or call 911 if deemed necessary. Patient identification was verified at the start of the visit: Yes    Total time spent for this encounter: 25 minutes    Services were provided through a video synchronous discussion virtually to substitute for in-person clinic visit. Patient and provider were located at their individual homes. --Chino De Oliveira MD on 10/29/2020 at 4:09 PM    An electronic signature was used to authenticate this note.   Patient Name: Beryl Day  : 1985  Medical Record: <[id number]>    MEDICATIONS  Current Outpatient Medications   Medication Sig Dispense Refill    gabapentin (NEURONTIN) 300 MG capsule Take 900 mg three times a day 270 capsule 5    ibuprofen (ADVIL;MOTRIN) 600 MG tablet TAKE 1 TABLET BY MOUTH FOUR TIMES DAILY AS NEEDED FOR PAIN 120 tablet 0    acetaminophen (TYLENOL) 500 MG tablet Take 1 tablet by mouth 4 times daily as needed for Pain 120 tablet 5    hydroCHLOROthiazide (MICROZIDE) 12.5 MG capsule TAKE 1 CAPSULE BY MOUTH DAILY 30 capsule 5    Cholecalciferol (VITAMIN D3) 50 MCG (2000 UT) TABS TAKE 1 TABLET BY MOUTH DAILY 30 tablet 5    acyclovir (ZOVIRAX) 400 MG tablet TAKE 1 TABLET (400 MG) BY ORAL ROUTE 2 TIMES PER DAY FOR 30 DAYS 60 tablet 2    amitriptyline (ELAVIL) 10 MG tablet Take 1 tablet by mouth nightly 90 tablet 1    levonorgestrel-ethinyl estradiol (VIENVA) 0.1-20 MG-MCG per tablet Take 1 tablet by mouth daily      hydrochlorothiazide (HYDRODIURIL) 25 MG tablet Take 1 tablet by mouth daily 90 tablet 1     No current facility-administered medications for this visit. ALLERGIES  No Known Allergies      Comments  No specialty comments available. Background history:  Marina Spencer is a 37 y.o. female who is being seen for follow up evaluation of  fibromyalgia. She was diagnosed with fibromyalgia in 2016. She has chronic widespread musculoskeletal pain involving upper and lower body on both sides of the midline. Symptoms are progressively getting worse. She describes her pain is constant, 8-10 out of 10, aching burning, sharp, without any significant relieving or aggravating factors. She wakes up frequently at night due to pain. Sleep is not refreshing. She also has fatigue. She has anxiety and depression. She gave birth to a baby girl 3 months ago. She had preeclampsia during pregnancy. She does not breast-feed. She has tried tramadol in the past with some benefit. She was also on Cymbalta briefly which made her feel sad/heavy bleeding. She has not tried any other treatment modalities. She takes amitriptyline as needed but it makes her too sleepy in the morning. She denies any joint swelling. She denies any family history of lupus or rheumatoid arthritis. She has also tried ibuprofen and Tylenol without any significant benefit. She denies psoriasis, inflammatory bowel disease, inflammatory back pain, dactylitis, enthesitis, tenosynovitis.   She was referred to aquatic therapy by PCP but has not been there yet.    Interim history: Presents for follow-up of fibromyalgia. She was last seen 6 months ago. She is on gabapentin 900 mg twice a day. She had some improvement in pain on gabapentin but continues to have widespread musculoskeletal pain involving upper and lower body on both sides of the midline. She is tender to touch. She also has fatigue. She is on amitriptyline 10 mg daily. She has not been to the aquatic therapy yet. She is also having pain in the pelvic muscles. She denies any side effects with gabapentin. She denies any recent fevers or infections. She could not tolerate Cymbalta. HPI  Review of Systems    REVIEW OF SYSTEMS:   Constitutional: No unanticipated weight loss or fevers. Integumentary: No rash, photosensitivity, malar rash, livedo reticularis, alopecia and Raynaud's symptoms, sclerodactyly, skin tightening  Eyes: negative for visual disturbance and persistent redness, discharge from eyes   ENT: - No tinnitus, loss of hearing, vertigo, or recurrent ear infections.  - No history of nasal/oral ulcers. - No history of dry eyes/dry mouth  Cardiovascular: No history of pericarditis, chest pain or murmur or palpitations  Respiratory: No shortness of breath, cough or history of interstitial lung disease. No history of pleurisy. No history of tuberculosis or atypical infections. Gastrointestinal: No history of heart burn, dysphagia or esophageal dysmotility. No change in bowel habits or any inflammatory bowel disease. Genitourinary: No history renal disease, miscarriages. Hematologic/Lymphatic: No abnormal bruising or bleeding, blood clots or swollen lymph nodes. Neurological: No history of headaches, seizure or focal weakness. No history of neuropathies, paresthesias or hyperesthesias, facial droop, diplopia  Psychiatric: No history of bipolar disease, anxiety  Endocrine: Denies any polyuria, polydipsia and osteoporosis  Allergic/Immunologic: No nasal congestion or hives.         I have reviewed patients Past medical History, Social History and Family History as mentioned in her chart and this remains unchanged fromprevious. Past Medical History:   Diagnosis Date    Depression     Fibromyalgia     Preeclampsia      No past surgical history on file.   Social History     Socioeconomic History    Marital status: Single     Spouse name: Not on file    Number of children: Not on file    Years of education: Not on file    Highest education level: Not on file   Occupational History    Not on file   Social Needs    Financial resource strain: Not on file    Food insecurity     Worry: Not on file     Inability: Not on file    Transportation needs     Medical: Not on file     Non-medical: Not on file   Tobacco Use    Smoking status: Never Smoker    Smokeless tobacco: Never Used   Substance and Sexual Activity    Alcohol use: Not on file    Drug use: Not on file    Sexual activity: Yes     Partners: Male   Lifestyle    Physical activity     Days per week: Not on file     Minutes per session: Not on file    Stress: Not on file   Relationships    Social connections     Talks on phone: Not on file     Gets together: Not on file     Attends Muslim service: Not on file     Active member of club or organization: Not on file     Attends meetings of clubs or organizations: Not on file     Relationship status: Not on file    Intimate partner violence     Fear of current or ex partner: Not on file     Emotionally abused: Not on file     Physically abused: Not on file     Forced sexual activity: Not on file   Other Topics Concern    Not on file   Social History Narrative    Not on file     Family History   Problem Relation Age of Onset    Anemia Mother     Heart Disease Father          PHYSICAL EXAMINATION:  [ INSTRUCTIONS:  \"[x]\" Indicates a positive item  \"[]\" Indicates a negative item  -- DELETE ALL ITEMS NOT EXAMINED]  Vital Signs: (As obtained by patient/caregiver or practitioner observation)    Blood pressure-  Heart rate-    Respiratory rate-    Temperature-  Pulse oximetry-     Constitutional: [x] Appears well-developed and well-nourished [x] No apparent distress      [] Abnormal-   Mental status  [x] Alert and awake  [x] Oriented to person/place/time [x]Able to follow commands      Eyes:  EOM    [x]  Normal  [] Abnormal-  Sclera  [x]  Normal  [] Abnormal -         Discharge [x]  None visible  [] Abnormal -    HENT:   [x] Normocephalic, atraumatic.   [] Abnormal   [x] Mouth/Throat: Mucous membranes are moist.     External Ears [x] Normal  [] Abnormal-     Neck: [x] No visualized mass     Pulmonary/Chest: [x] Respiratory effort normal.  [x] No visualized signs of difficulty breathing or respiratory distress        [] Abnormal-      Musculoskeletal:   [x] Normal gait with no signs of ataxia         [x] Normal range of motion of neck        [] Abnormal-       Neurological:        [x] No Facial Asymmetry (Cranial nerve 7 motor function) (limited exam to video visit)          [x] No gaze palsy        [] Abnormal-         Skin:        [x] No significant exanthematous lesions or discoloration noted on facial skin         [] Abnormal-            Psychiatric:       [x] Normal Affect [x] No Hallucinations        [] Abnormal-         LABS AND IMAGING  Outside data reviewed and in HPI    Lab Results   Component Value Date    WBC 8.2 08/07/2020    RBC 4.10 08/07/2020    HGB 13.0 08/07/2020    HCT 38.3 08/07/2020     08/07/2020    MCV 93.6 08/07/2020    MCH 31.8 08/07/2020    MCHC 34.0 08/07/2020    RDW 12.7 08/07/2020    LYMPHOPCT 24.2 08/07/2020    MONOPCT 5.6 08/07/2020    BASOPCT 0.4 08/07/2020    MONOSABS 0.5 08/07/2020    LYMPHSABS 2.0 08/07/2020    EOSABS 0.3 08/07/2020    BASOSABS 0.0 08/07/2020       Chemistry        Component Value Date/Time     08/07/2020 1540    K 4.0 08/07/2020 1540    CL 99 08/07/2020 1540    CO2 25 08/07/2020 1540    BUN 10 08/07/2020 1540    CREATININE 0.9 08/07/2020 1540        Component Value Date/Time    CALCIUM 9.4 08/07/2020 1540    ALKPHOS 61 08/07/2020 1540    AST 21 08/07/2020 1540    ALT 30 08/07/2020 1540    BILITOT 0.3 08/07/2020 1540          Lab Results   Component Value Date    SEDRATE 14 06/24/2019     Lab Results   Component Value Date    CRP 0.5 06/24/2019     Lab Results   Component Value Date    VIRGIE Negative 06/24/2019     Lab Results   Component Value Date    RF <10.0 06/24/2019     Lab Results   Component Value Date    VIRGIE Negative 06/24/2019     No results found for: DSDNAG, DSDNAIGGIFA  No results found for: SSAROAB, SSALAAB  No results found for: SMAB, RNPAB  No results found for: CENTABIGG  No results found for: C3, C4, ACE  Lab Results   Component Value Date    VITD25 18.7 06/24/2019     No results found for: Lopez Lanius  Lab Results   Component Value Date    VSMQSSWW81 559 06/24/2019     Lab Results   Component Value Date    TSHFT4 3.00 06/24/2019     Lab Results   Component Value Date    VITD25 18.7 (L) 06/24/2019       ASSESSMENT AND PLAN      Assessment/Plan:      ASSESSMENT:    1. Fibromyalgia        PLAN:   1. Fibromyalgia  Fibromyalgia is a non-life threatening non-rheumatic disease. Discussed diagnosis, pathophysiology and management options with the patient. I discussed various treatment options with the patient including the medical management and physical therapy/aquatic therapy as well as self exercises. Went over various FDA approved (cymbalta, lyrica, gabapentin, sevalla) and non-fda approved medications (muscle relaxants, ssri's) with the patient.  Written material was provided to the patient on fibromyalgia.   -Continues to be symptomatic  Increase gabapentin to 900 mg 3 times a day  Intolerant to Cymbalta [worsening depression]  -Continue amitriptyline 10 mg at bedtime  Refer to aquatic therapy  -Strongly emphasized on low impact aerobic and stretching exercises including biking, swimming, walking, yoga or tiachi classes  -deep breathing and relaxation exercises   -mindfulness techniques  -Counseled on Sleep hygiene   -Advised to drink 64 oz of water   -Limit caffeine intake to 8 oz/day       Time spent with the patient 25 minutes and half of the time spent with counseling/coordination of care    The patient indicates understanding of these issues and agrees with the plan. Return in about 6 months (around 4/29/2021). The risks and benefits of my recommendations, as well as other treatment options, benefits and side effects werediscussed with the patient. All questions were answered. ######################################################################    I thank you for giving me theopportunity to participate in [facility]. If you have any questions or concerns please feel free to contact me. I look forward to following  Kenia along with you. Electronically signed by: Petty Cleveland MD, 10/29/2020 4:09 PM    Documentation was done using voice recognition dragon software. Every effort was made to ensure accuracy;however, inadvertent unintentional computerized transcription errors may be present.

## 2020-10-29 NOTE — PATIENT INSTRUCTIONS
-Strongly emphasized on low impact aerobic and stretching exercises including biking, swimming, walking, yoga or tiachi classes  -deep breathing and relaxation exercises   -mindfulness techniques  -Counseled on Sleep hygiene   -Advised to drink 64 oz of water   -Limit caffeine intake to 8 oz/day   Increase gabapentin 900 mg three times a day  Aquatic therapy

## 2020-11-03 ENCOUNTER — TELEPHONE (OUTPATIENT)
Dept: RHEUMATOLOGY | Age: 35
End: 2020-11-03

## 2020-11-03 NOTE — TELEPHONE ENCOUNTER
PA SUBMITTED VIA [facility] FOR Gabapentin 300MG capsules  Key: [id number] - PA Case ID: [id number]   STATUS: PENDING

## 2020-11-05 NOTE — TELEPHONE ENCOUNTER
Approved on November 3   Request Reference Number: [id number].  GABAPENTIN CAP 300MG is approved through 11/03/2021

## 2020-11-30 RX ORDER — IBUPROFEN 600 MG/1
TABLET ORAL
Qty: 120 TABLET | Refills: 0 | Status: SHIPPED | OUTPATIENT
Start: 2020-11-30 | End: 2020-12-29

## 2020-12-07 RX ORDER — AMITRIPTYLINE HYDROCHLORIDE 10 MG/1
TABLET, FILM COATED ORAL
Qty: 30 TABLET | Refills: 1 | Status: SHIPPED | OUTPATIENT
Start: 2020-12-07 | End: 2021-02-01 | Stop reason: SDUPTHER

## 2020-12-29 RX ORDER — IBUPROFEN 600 MG/1
TABLET ORAL
Qty: 120 TABLET | Refills: 0 | Status: SHIPPED | OUTPATIENT
Start: 2020-12-29 | End: 2021-01-26 | Stop reason: SDUPTHER

## 2021-01-11 DIAGNOSIS — E55.9 VITAMIN D DEFICIENCY: ICD-10-CM

## 2021-01-11 RX ORDER — CHOLECALCIFEROL (VITAMIN D3) 125 MCG
CAPSULE ORAL
Qty: 30 TABLET | Refills: 5 | Status: SHIPPED | OUTPATIENT
Start: 2021-01-11 | End: 2022-01-03

## 2021-01-26 RX ORDER — IBUPROFEN 600 MG/1
TABLET ORAL
Qty: 120 TABLET | Refills: 0 | Status: SHIPPED | OUTPATIENT
Start: 2021-01-26 | End: 2021-03-01

## 2021-02-02 RX ORDER — AMITRIPTYLINE HYDROCHLORIDE 10 MG/1
TABLET, FILM COATED ORAL
Qty: 30 TABLET | Refills: 2 | Status: SHIPPED | OUTPATIENT
Start: 2021-02-02 | End: 2021-04-30 | Stop reason: SDUPTHER

## 2021-02-15 DIAGNOSIS — R60.9 EDEMA, UNSPECIFIED TYPE: ICD-10-CM

## 2021-02-15 RX ORDER — HYDROCHLOROTHIAZIDE 12.5 MG/1
12.5 CAPSULE, GELATIN COATED ORAL DAILY
Qty: 30 CAPSULE | Refills: 5 | Status: SHIPPED | OUTPATIENT
Start: 2021-02-15 | End: 2021-08-04 | Stop reason: SDUPTHER

## 2021-03-01 RX ORDER — IBUPROFEN 600 MG/1
TABLET ORAL
Qty: 120 TABLET | Refills: 0 | Status: SHIPPED | OUTPATIENT
Start: 2021-03-01 | End: 2021-04-05

## 2021-04-05 RX ORDER — IBUPROFEN 600 MG/1
TABLET ORAL
Qty: 120 TABLET | Refills: 0 | Status: SHIPPED | OUTPATIENT
Start: 2021-04-05

## 2021-04-29 NOTE — PROGRESS NOTES
months ago. She is on gabapentin 900 mg 3 times day. She had some improvement in pain on gabapentin but continues to have widespread musculoskeletal pain involving upper and lower body on both sides of the midline. She is tender to touch. She also has fatigue. She is on amitriptyline 10 mg daily. She has not been to the aquatic therapy yet. She is also having pain in the pelvic muscles. She sees a physical therapist for pelvic floor exercises. She denies any side effects with gabapentin or amitriptyline. She denies any recent fevers or infections. She could not tolerate Cymbalta. HPI  Review of Systems    REVIEW OF SYSTEMS:   Constitutional: No unanticipated weight loss or fevers. Integumentary: No rash, photosensitivity, malar rash, livedo reticularis, alopecia and Raynaud's symptoms, sclerodactyly, skin tightening  Eyes: negative for visual disturbance and persistent redness, discharge from eyes   ENT: - No tinnitus, loss of hearing, vertigo, or recurrent ear infections.  - No history of nasal/oral ulcers. - No history of dry eyes/dry mouth  Cardiovascular: No history of pericarditis, chest pain or murmur or palpitations  Respiratory: No shortness of breath, cough or history of interstitial lung disease. No history of pleurisy. No history of tuberculosis or atypical infections. Gastrointestinal: No history of heart burn, dysphagia or esophageal dysmotility. No change in bowel habits or any inflammatory bowel disease. Genitourinary: No history renal disease, miscarriages. Hematologic/Lymphatic: No abnormal bruising or bleeding, blood clots or swollen lymph nodes. Neurological: No history of headaches, seizure or focal weakness. No history of neuropathies, paresthesias or hyperesthesias, facial droop, diplopia  Psychiatric: No history of bipolar disease, anxiety  Endocrine: Denies any polyuria, polydipsia and osteoporosis  Allergic/Immunologic: No nasal congestion or hives.         I have reviewed patients Past medical History, Social History and Family History as mentioned in her chart and this remains unchanged fromprevious. Past Medical History:   Diagnosis Date    Depression     Fibromyalgia     Preeclampsia      No past surgical history on file.   Social History     Socioeconomic History    Marital status: Single     Spouse name: Not on file    Number of children: Not on file    Years of education: Not on file    Highest education level: Not on file   Occupational History    Not on file   Social Needs    Financial resource strain: Not on file    Food insecurity     Worry: Not on file     Inability: Not on file    Transportation needs     Medical: Not on file     Non-medical: Not on file   Tobacco Use    Smoking status: Never Smoker    Smokeless tobacco: Never Used   Substance and Sexual Activity    Alcohol use: Not on file    Drug use: Not on file    Sexual activity: Yes     Partners: Male   Lifestyle    Physical activity     Days per week: Not on file     Minutes per session: Not on file    Stress: Not on file   Relationships    Social connections     Talks on phone: Not on file     Gets together: Not on file     Attends Mosque service: Not on file     Active member of club or organization: Not on file     Attends meetings of clubs or organizations: Not on file     Relationship status: Not on file    Intimate partner violence     Fear of current or ex partner: Not on file     Emotionally abused: Not on file     Physically abused: Not on file     Forced sexual activity: Not on file   Other Topics Concern    Not on file   Social History Narrative    Not on file     Family History   Problem Relation Age of Onset    Anemia Mother     Heart Disease Father          PHYSICAL EXAMINATION:  [ INSTRUCTIONS:  \"[x]\" Indicates a positive item  \"[]\" Indicates a negative item  -- DELETE ALL ITEMS NOT EXAMINED]  Vital Signs: (As obtained by patient/caregiver or practitioner observation)    Blood pressure-  Heart rate-    Respiratory rate-    Temperature-  Pulse oximetry-     Constitutional: [x] Appears well-developed and well-nourished [x] No apparent distress      [] Abnormal-   Mental status  [x] Alert and awake  [x] Oriented to person/place/time [x]Able to follow commands      Eyes:  EOM    [x]  Normal  [] Abnormal-  Sclera  [x]  Normal  [] Abnormal -         Discharge [x]  None visible  [] Abnormal -    HENT:   [x] Normocephalic, atraumatic.   [] Abnormal   [x] Mouth/Throat: Mucous membranes are moist.     External Ears [x] Normal  [] Abnormal-     Neck: [x] No visualized mass     Pulmonary/Chest: [x] Respiratory effort normal.  [x] No visualized signs of difficulty breathing or respiratory distress        [] Abnormal-      Musculoskeletal:   [x] Normal gait with no signs of ataxia         [x] Normal range of motion of neck        [] Abnormal-       Neurological:        [x] No Facial Asymmetry (Cranial nerve 7 motor function) (limited exam to video visit)          [x] No gaze palsy        [] Abnormal-         Skin:        [x] No significant exanthematous lesions or discoloration noted on facial skin         [] Abnormal-            Psychiatric:       [x] Normal Affect [x] No Hallucinations        [] Abnormal-         LABS AND IMAGING  Outside data reviewed and in HPI    Lab Results   Component Value Date    WBC 8.2 08/07/2020    RBC 4.10 08/07/2020    HGB 13.0 08/07/2020    HCT 38.3 08/07/2020     08/07/2020    MCV 93.6 08/07/2020    MCH 31.8 08/07/2020    MCHC 34.0 08/07/2020    RDW 12.7 08/07/2020    LYMPHOPCT 24.2 08/07/2020    MONOPCT 5.6 08/07/2020    BASOPCT 0.4 08/07/2020    MONOSABS 0.5 08/07/2020    LYMPHSABS 2.0 08/07/2020    EOSABS 0.3 08/07/2020    BASOSABS 0.0 08/07/2020       Chemistry        Component Value Date/Time     08/07/2020 1540    K 4.0 08/07/2020 1540    CL 99 08/07/2020 1540    CO2 25 08/07/2020 1540    BUN 10 08/07/2020 1540    CREATININE 0.9 08/07/2020 1540        Component Value Date/Time    CALCIUM 9.4 08/07/2020 1540    ALKPHOS 61 08/07/2020 1540    AST 21 08/07/2020 1540    ALT 30 08/07/2020 1540    BILITOT 0.3 08/07/2020 1540          Lab Results   Component Value Date    SEDRATE 14 06/24/2019     Lab Results   Component Value Date    CRP 0.5 06/24/2019     Lab Results   Component Value Date    VIRGIE Negative 06/24/2019     Lab Results   Component Value Date    RF <10.0 06/24/2019     Lab Results   Component Value Date    VIRGIE Negative 06/24/2019     No results found for: DSDNAG, DSDNAIGGIFA  No results found for: SSAROAB, SSALAAB  No results found for: SMAB, RNPAB  No results found for: CENTABIGG  No results found for: C3, C4, ACE  Lab Results   Component Value Date    VITD25 18.7 06/24/2019     No results found for: Corrie Alas  Lab Results   Component Value Date    PUCTOKAJ69 559 06/24/2019     Lab Results   Component Value Date    TSHFT4 3.00 06/24/2019     Lab Results   Component Value Date    VITD25 18.7 (L) 06/24/2019       ASSESSMENT AND PLAN      Assessment/Plan:      ASSESSMENT:    1. Fibromyalgia        PLAN:   1. Fibromyalgia  Fibromyalgia is a non-life threatening non-rheumatic disease. Discussed diagnosis, pathophysiology and management options with the patient. I discussed various treatment options with the patient including the medical management and physical therapy/aquatic therapy as well as self exercises. Went over various FDA approved (cymbalta, lyrica, gabapentin, sevalla) and non-fda approved medications (muscle relaxants, ssri's) with the patient.  Written material was provided to the patient on fibromyalgia.   -Continues to be symptomatic  Continue gabapentin 900 mg 3 times a day  Intolerant to Cymbalta [worsening depression]  Increase amitriptyline to 20 mg at bedtime  Start water therapy  -Strongly emphasized on low impact aerobic and stretching exercises including biking, swimming, walking, yoga or tiachi classes  -deep breathing and relaxation exercises   -mindfulness techniques  -Counseled on Sleep hygiene   -Advised to drink 64 oz of water   -Limit caffeine intake to 8 oz/day       Time spent with the patient 30 minutes and half of the time spent with counseling/coordination of care    The patient indicates understanding of these issues and agrees with the plan. Return in about 6 months (around 10/30/2021). The risks and benefits of my recommendations, as well as other treatment options, benefits and side effects werediscussed with the patient. All questions were answered. ######################################################################    I thank you for giving me theopportunity to participate in [facility]. If you have any questions or concerns please feel free to contact me. I look forward to following  Kenia along with you. Electronically signed by: Ema Dobbs MD, 4/30/2021 10:08 AM    Documentation was done using voice recognition dragon software. Every effort was made to ensure accuracy;however, inadvertent unintentional computerized transcription errors may be present.

## 2021-04-30 ENCOUNTER — VIRTUAL VISIT (OUTPATIENT)
Dept: RHEUMATOLOGY | Age: 36
End: 2021-04-30
Payer: MEDICAID

## 2021-04-30 DIAGNOSIS — M79.7 FIBROMYALGIA: Primary | ICD-10-CM

## 2021-04-30 DIAGNOSIS — M79.7 FIBROMYALGIA: ICD-10-CM

## 2021-04-30 PROCEDURE — 99214 OFFICE O/P EST MOD 30 MIN: CPT | Performed by: INTERNAL MEDICINE

## 2021-04-30 PROCEDURE — G8427 DOCREV CUR MEDS BY ELIG CLIN: HCPCS | Performed by: INTERNAL MEDICINE

## 2021-04-30 RX ORDER — AMITRIPTYLINE HYDROCHLORIDE 10 MG/1
TABLET, FILM COATED ORAL
Qty: 60 TABLET | Refills: 5 | Status: SHIPPED | OUTPATIENT
Start: 2021-04-30 | End: 2021-08-04 | Stop reason: SDUPTHER

## 2021-04-30 RX ORDER — GABAPENTIN 300 MG/1
CAPSULE ORAL
Qty: 270 CAPSULE | Refills: 5 | Status: SHIPPED | OUTPATIENT
Start: 2021-04-30 | End: 2021-10-29

## 2021-04-30 NOTE — PATIENT INSTRUCTIONS
Increase elavil to 20 mg daily   Continue gabapentin   Labs   -Strongly emphasized on low impact aerobic and stretching exercises including biking, swimming, walking, yoga or tiachi classes  -deep breathing and relaxation exercises   -mindfulness techniques  -Counseled on Sleep hygiene   -Advised to drink 64 oz of water   -Limit caffeine intake to 8 oz/day

## 2021-07-29 DIAGNOSIS — R60.9 EDEMA, UNSPECIFIED TYPE: ICD-10-CM

## 2021-07-29 RX ORDER — HYDROCHLOROTHIAZIDE 12.5 MG/1
12.5 CAPSULE, GELATIN COATED ORAL DAILY
Qty: 30 CAPSULE | Refills: 5 | OUTPATIENT
Start: 2021-07-29

## 2021-08-04 ENCOUNTER — OFFICE VISIT (OUTPATIENT)
Dept: FAMILY MEDICINE CLINIC | Age: 36
End: 2021-08-04
Payer: MEDICAID

## 2021-08-04 ENCOUNTER — PATIENT MESSAGE (OUTPATIENT)
Dept: FAMILY MEDICINE CLINIC | Age: 36
End: 2021-08-04

## 2021-08-04 VITALS
RESPIRATION RATE: 16 BRPM | OXYGEN SATURATION: 98 % | WEIGHT: 148.7 LBS | TEMPERATURE: 98.5 F | DIASTOLIC BLOOD PRESSURE: 84 MMHG | HEART RATE: 96 BPM | SYSTOLIC BLOOD PRESSURE: 130 MMHG | BODY MASS INDEX: 29.19 KG/M2 | HEIGHT: 60 IN

## 2021-08-04 DIAGNOSIS — B00.9 HERPES SIMPLEX: ICD-10-CM

## 2021-08-04 DIAGNOSIS — M79.7 FIBROMYALGIA: ICD-10-CM

## 2021-08-04 DIAGNOSIS — Z00.00 WELL ADULT EXAM: Primary | ICD-10-CM

## 2021-08-04 DIAGNOSIS — R60.9 EDEMA, UNSPECIFIED TYPE: ICD-10-CM

## 2021-08-04 PROCEDURE — 99395 PREV VISIT EST AGE 18-39: CPT | Performed by: FAMILY MEDICINE

## 2021-08-04 RX ORDER — ACYCLOVIR 400 MG/1
TABLET ORAL
Qty: 60 TABLET | Refills: 2 | Status: SHIPPED | OUTPATIENT
Start: 2021-08-04 | End: 2022-01-03

## 2021-08-04 RX ORDER — HYDROCHLOROTHIAZIDE 12.5 MG/1
12.5 CAPSULE, GELATIN COATED ORAL DAILY
Qty: 30 CAPSULE | Refills: 5 | Status: SHIPPED | OUTPATIENT
Start: 2021-08-04 | End: 2022-01-27

## 2021-08-04 RX ORDER — AMITRIPTYLINE HYDROCHLORIDE 10 MG/1
TABLET, FILM COATED ORAL
Qty: 90 TABLET | Refills: 2 | Status: SHIPPED | OUTPATIENT
Start: 2021-08-04 | End: 2021-10-29

## 2021-08-04 ASSESSMENT — COLUMBIA-SUICIDE SEVERITY RATING SCALE - C-SSRS
2. HAVE YOU ACTUALLY HAD ANY THOUGHTS OF KILLING YOURSELF?: NO
1. WITHIN THE PAST MONTH, HAVE YOU WISHED YOU WERE DEAD OR WISHED YOU COULD GO TO SLEEP AND NOT WAKE UP?: NO
6. HAVE YOU EVER DONE ANYTHING, STARTED TO DO ANYTHING, OR PREPARED TO DO ANYTHING TO END YOUR LIFE?: NO

## 2021-08-04 ASSESSMENT — PATIENT HEALTH QUESTIONNAIRE - PHQ9
4. FEELING TIRED OR HAVING LITTLE ENERGY: 3
SUM OF ALL RESPONSES TO PHQ QUESTIONS 1-9: 12
SUM OF ALL RESPONSES TO PHQ9 QUESTIONS 1 & 2: 4
7. TROUBLE CONCENTRATING ON THINGS, SUCH AS READING THE NEWSPAPER OR WATCHING TELEVISION: 1
8. MOVING OR SPEAKING SO SLOWLY THAT OTHER PEOPLE COULD HAVE NOTICED. OR THE OPPOSITE, BEING SO FIGETY OR RESTLESS THAT YOU HAVE BEEN MOVING AROUND A LOT MORE THAN USUAL: 0
1. LITTLE INTEREST OR PLEASURE IN DOING THINGS: 1
5. POOR APPETITE OR OVEREATING: 1
6. FEELING BAD ABOUT YOURSELF - OR THAT YOU ARE A FAILURE OR HAVE LET YOURSELF OR YOUR FAMILY DOWN: 0
SUM OF ALL RESPONSES TO PHQ QUESTIONS 1-9: 12
SUM OF ALL RESPONSES TO PHQ QUESTIONS 1-9: 12
9. THOUGHTS THAT YOU WOULD BE BETTER OFF DEAD, OR OF HURTING YOURSELF: 0
2. FEELING DOWN, DEPRESSED OR HOPELESS: 3
3. TROUBLE FALLING OR STAYING ASLEEP: 3
10. IF YOU CHECKED OFF ANY PROBLEMS, HOW DIFFICULT HAVE THESE PROBLEMS MADE IT FOR YOU TO DO YOUR WORK, TAKE CARE OF THINGS AT HOME, OR GET ALONG WITH OTHER PEOPLE: 0

## 2021-08-04 NOTE — PROGRESS NOTES
Subjective:      Patient ID: Jeremy Valdez is a 38 y.o. female. HPI  Well Adult Physical   Patient here for a comprehensive physical exam.The patient reports problems - get med refills  Sx are stable, no sec effects. Do you take any herbs or supplements that were not prescribed by a doctor? no Are you taking calcium supplements? not applicable Are you taking aspirin daily? not applicable   History:  Pap per her report utd      Review of Systems   Constitutional: Negative for activity change, appetite change, fatigue, fever and unexpected weight change. HENT: Negative for congestion, postnasal drip, rhinorrhea and trouble swallowing. Eyes: Negative for redness and itching. Respiratory: Negative for chest tightness, shortness of breath and wheezing. Cardiovascular: Negative for chest pain and palpitations. Gastrointestinal: Negative for abdominal pain, nausea and vomiting. Endocrine: Negative for cold intolerance, heat intolerance, polydipsia, polyphagia and polyuria. Genitourinary: Negative for dysuria and urgency. Musculoskeletal: Negative for arthralgias, joint swelling, myalgias and neck pain. Skin: Negative for color change and rash. Neurological: Negative for dizziness, tremors, syncope, light-headedness and headaches. Hematological: Negative for adenopathy. Psychiatric/Behavioral: Negative for behavioral problems and sleep disturbance. The patient is not nervous/anxious. Objective:  Blood pressure 130/84, pulse 96, temperature 98.5 °F (36.9 °C), temperature source Tympanic, resp. rate 16, height 5' (1.524 m), weight 148 lb 11.2 oz (67.4 kg), last menstrual period 07/06/2021, SpO2 98 %, not currently breastfeeding. Physical Exam  Vitals and nursing note reviewed. Constitutional:       General: She is not in acute distress. Appearance: Normal appearance. She is well-developed. She is not ill-appearing, toxic-appearing or diaphoretic.    HENT:      Head: Normocephalic and atraumatic. Right Ear: External ear normal.      Left Ear: External ear normal.      Nose: Nose normal.      Mouth/Throat:      Pharynx: No oropharyngeal exudate. Eyes:      General: No scleral icterus. Right eye: No discharge. Left eye: No discharge. Conjunctiva/sclera: Conjunctivae normal.      Pupils: Pupils are equal, round, and reactive to light. Neck:      Thyroid: No thyromegaly. Vascular: No carotid bruit or JVD. Trachea: No tracheal deviation. Cardiovascular:      Rate and Rhythm: Normal rate and regular rhythm. Pulses: Normal pulses. Heart sounds: Normal heart sounds. No murmur heard. No friction rub. No gallop. Pulmonary:      Effort: Pulmonary effort is normal. No respiratory distress. Breath sounds: Normal breath sounds. No wheezing or rales. Chest:      Chest wall: No tenderness. Abdominal:      General: Bowel sounds are normal. There is no distension. Palpations: Abdomen is soft. There is no mass. Tenderness: There is no abdominal tenderness. There is no guarding or rebound. Musculoskeletal:         General: No tenderness. Normal range of motion. Cervical back: Normal range of motion and neck supple. Right lower leg: No edema. Left lower leg: No edema. Lymphadenopathy:      Cervical: No cervical adenopathy. Skin:     General: Skin is warm. Capillary Refill: Capillary refill takes less than 2 seconds. Coloration: Skin is not pale. Findings: No erythema or rash. Neurological:      General: No focal deficit present. Mental Status: She is alert and oriented to person, place, and time. Mental status is at baseline. Cranial Nerves: No cranial nerve deficit. Motor: No weakness or abnormal muscle tone. Coordination: Coordination normal.      Gait: Gait normal.      Deep Tendon Reflexes: Reflexes are normal and symmetric.    Psychiatric:         Mood and Affect: Mood normal.         Behavior: Behavior normal.         Thought Content: Thought content normal.         Judgment: Judgment normal.         Assessment:       Diagnosis Orders   1. Well adult exam  BASIC METABOLIC PANEL    Lipid Panel    Hepatitis C Antibody    HIV Screen    CBC Auto Differential    TSH without Reflex   2. Edema, unspecified type  hydroCHLOROthiazide (MICROZIDE) 12.5 MG capsule    BASIC METABOLIC PANEL   3. Fibromyalgia  amitriptyline (ELAVIL) 10 MG tablet   4. Herpes simplex  acyclovir (ZOVIRAX) 400 MG tablet           Plan:      Well adult:    . Doing well, encourage healthy diet, exercise, safety    . Screening labs orders given   . Preventive:recommended covid vaccine    2. Stable  Continue same medications no changes needed at this time   Refills    3. Improved with current tx   Do not take elavil during daytime   patient agrees and verbalizes understanding    4.  Refills , use prn   patient agrees and verbalizes understanding      Zachariah Cabrera MD

## 2021-08-05 ASSESSMENT — ENCOUNTER SYMPTOMS
EYE REDNESS: 0
WHEEZING: 0
CHEST TIGHTNESS: 0
TROUBLE SWALLOWING: 0
NAUSEA: 0
VOMITING: 0
EYE ITCHING: 0
RHINORRHEA: 0
COLOR CHANGE: 0
ABDOMINAL PAIN: 0
SHORTNESS OF BREATH: 0

## 2021-08-17 DIAGNOSIS — J30.2 SEASONAL ALLERGIES: Primary | ICD-10-CM

## 2021-08-17 RX ORDER — FLUTICASONE PROPIONATE 50 MCG
1 SPRAY, SUSPENSION (ML) NASAL DAILY
Qty: 1 BOTTLE | Refills: 0 | Status: SHIPPED | OUTPATIENT
Start: 2021-08-17 | End: 2022-09-23 | Stop reason: SDUPTHER

## 2021-08-17 NOTE — TELEPHONE ENCOUNTER
The order is in epic for both labs it was placed 8/4. I will send flonase in for her. I think you can call lab and ask them to add on for 24 hours.

## 2021-10-29 DIAGNOSIS — M79.7 FIBROMYALGIA: ICD-10-CM

## 2021-10-29 RX ORDER — AMITRIPTYLINE HYDROCHLORIDE 10 MG/1
TABLET, FILM COATED ORAL
Qty: 90 TABLET | Refills: 2 | Status: SHIPPED | OUTPATIENT
Start: 2021-10-29 | End: 2021-11-01 | Stop reason: SDUPTHER

## 2021-10-29 NOTE — TELEPHONE ENCOUNTER
Medication:   Requested Prescriptions     Pending Prescriptions Disp Refills    amitriptyline (ELAVIL) 10 MG tablet [Pharmacy Med Name: AMITRIPTYLINE 10MG TABLETS] 90 tablet 2     Sig: TAKE 3 TABLETS BY MOUTH EVERY NIGHT AT BEDTIME             Patient Phone Number: [phone] (home)     Last appt: 8/4/2021

## 2021-11-01 ENCOUNTER — OFFICE VISIT (OUTPATIENT)
Dept: RHEUMATOLOGY | Age: 36
End: 2021-11-01
Payer: MEDICAID

## 2021-11-01 VITALS
SYSTOLIC BLOOD PRESSURE: 110 MMHG | WEIGHT: 143.8 LBS | HEART RATE: 86 BPM | BODY MASS INDEX: 28.08 KG/M2 | DIASTOLIC BLOOD PRESSURE: 70 MMHG

## 2021-11-01 DIAGNOSIS — M79.7 FIBROMYALGIA: ICD-10-CM

## 2021-11-01 LAB
A/G RATIO: 1.5 (ref 1.1–2.2)
ALBUMIN SERPL-MCNC: 4.6 G/DL (ref 3.4–5)
ALP BLD-CCNC: 58 U/L (ref 40–129)
ALT SERPL-CCNC: 23 U/L (ref 10–40)
ANION GAP SERPL CALCULATED.3IONS-SCNC: 15 MMOL/L (ref 3–16)
AST SERPL-CCNC: 19 U/L (ref 15–37)
BILIRUB SERPL-MCNC: 0.3 MG/DL (ref 0–1)
BUN BLDV-MCNC: 9 MG/DL (ref 7–20)
CALCIUM SERPL-MCNC: 9.7 MG/DL (ref 8.3–10.6)
CHLORIDE BLD-SCNC: 99 MMOL/L (ref 99–110)
CO2: 25 MMOL/L (ref 21–32)
CREAT SERPL-MCNC: 0.9 MG/DL (ref 0.6–1.1)
GFR AFRICAN AMERICAN: >60
GFR NON-AFRICAN AMERICAN: >60
GLUCOSE BLD-MCNC: 88 MG/DL (ref 70–99)
POTASSIUM SERPL-SCNC: 4.7 MMOL/L (ref 3.5–5.1)
SODIUM BLD-SCNC: 139 MMOL/L (ref 136–145)
TOTAL PROTEIN: 7.6 G/DL (ref 6.4–8.2)

## 2021-11-01 PROCEDURE — 1036F TOBACCO NON-USER: CPT | Performed by: INTERNAL MEDICINE

## 2021-11-01 PROCEDURE — G8427 DOCREV CUR MEDS BY ELIG CLIN: HCPCS | Performed by: INTERNAL MEDICINE

## 2021-11-01 PROCEDURE — G8484 FLU IMMUNIZE NO ADMIN: HCPCS | Performed by: INTERNAL MEDICINE

## 2021-11-01 PROCEDURE — G8419 CALC BMI OUT NRM PARAM NOF/U: HCPCS | Performed by: INTERNAL MEDICINE

## 2021-11-01 PROCEDURE — 99214 OFFICE O/P EST MOD 30 MIN: CPT | Performed by: INTERNAL MEDICINE

## 2021-11-01 RX ORDER — AMITRIPTYLINE HYDROCHLORIDE 50 MG/1
TABLET, FILM COATED ORAL
Qty: 30 TABLET | Refills: 5 | Status: SHIPPED | OUTPATIENT
Start: 2021-11-01 | End: 2022-06-21 | Stop reason: SDUPTHER

## 2021-11-01 NOTE — PROGRESS NOTES
2021   Patient Name: Allie Mcneill  : 1985  Medical Record: <[id number]>    MEDICATIONS  Current Outpatient Medications   Medication Sig Dispense Refill    amitriptyline (ELAVIL) 50 MG tablet Take 1 tab at night 30 tablet 5    gabapentin (NEURONTIN) 300 MG capsule TAKE 3 CAPSULES BY MOUTH THREE TIMES DAILY 270 capsule 2    fluticasone (FLONASE) 50 MCG/ACT nasal spray 1 spray by Each Nostril route daily 1 Bottle 0    hydroCHLOROthiazide (MICROZIDE) 12.5 MG capsule Take 1 capsule by mouth daily 30 capsule 5    acyclovir (ZOVIRAX) 400 MG tablet TAKE 1 TABLET (400 MG) BY ORAL ROUTE 2 TIMES PER DAY FOR 30 DAYS 60 tablet 2    ibuprofen (ADVIL;MOTRIN) 600 MG tablet TAKE 1 TABLET BY MOUTH FOUR TIMES DAILY AS NEEDED FOR PAIN 120 tablet 0    ACETAMINOPHEN EXTRA STRENGTH 500 MG tablet TAKE 1 TABLET BY MOUTH FOUR TIMES DAILY AS NEEDED FOR PAIN 120 tablet 0    Cholecalciferol (VITAMIN D3) 50 MCG (2000 UT) TABS TAKE 1 TABLET BY MOUTH DAILY 30 tablet 5    levonorgestrel-ethinyl estradiol (VIENVA) 0.1-20 MG-MCG per tablet Take 1 tablet by mouth daily       No current facility-administered medications for this visit. ALLERGIES  No Known Allergies      Comments  No specialty comments available. Background history:  Allie Mcneill is a 38 y.o. female who is being seen for follow up evaluation of  fibromyalgia. She was diagnosed with fibromyalgia in 2016. She has chronic widespread musculoskeletal pain involving upper and lower body on both sides of the midline. Symptoms are progressively getting worse. She describes her pain is constant, 8-10 out of 10, aching burning, sharp, without any significant relieving or aggravating factors. She wakes up frequently at night due to pain. Sleep is not refreshing. She also has fatigue. She has anxiety and depression. She gave birth to a baby girl 3 months ago. She had preeclampsia during pregnancy. She does not breast-feed.   She has tried tramadol in the past with some benefit. She was also on Cymbalta briefly which made her feel sad/heavy bleeding. She has not tried any other treatment modalities. She takes amitriptyline as needed but it makes her too sleepy in the morning. She denies any joint swelling. She denies any family history of lupus or rheumatoid arthritis. She has also tried ibuprofen and Tylenol without any significant benefit. She denies psoriasis, inflammatory bowel disease, inflammatory back pain, dactylitis, enthesitis, tenosynovitis. She was referred to aquatic therapy by PCP but has not been there yet. Interim history: Presents for follow-up of fibromyalgia. She was last seen 6 months ago. She is on gabapentin 900 mg 3 times day. She is also on amitriptyline 30 mg at night. She had some improvement in pain  but continues to have widespread musculoskeletal pain involving upper and lower body on both sides of the midline. She is tender to touch. She also has fatigue. She has not been to the aquatic therapy yet. She is also having pain in the pelvic muscles. She sees a physical therapist for pelvic floor exercises. She denies any side effects with gabapentin or amitriptyline. She denies any recent fevers or infections. She could not tolerate Cymbalta. HPI  Review of Systems    REVIEW OF SYSTEMS:   Constitutional: No unanticipated weight loss or fevers. Integumentary: No rash, photosensitivity, malar rash, livedo reticularis, alopecia and Raynaud's symptoms, sclerodactyly, skin tightening  Eyes: negative for visual disturbance and persistent redness, discharge from eyes   ENT: - No tinnitus, loss of hearing, vertigo, or recurrent ear infections.  - No history of nasal/oral ulcers. - No history of dry eyes/dry mouth  Cardiovascular: No history of pericarditis, chest pain or murmur or palpitations  Respiratory: No shortness of breath, cough or history of interstitial lung disease. No history of pleurisy.  No history of tuberculosis or atypical infections. Gastrointestinal: No history of heart burn, dysphagia or esophageal dysmotility. No change in bowel habits or any inflammatory bowel disease. Genitourinary: No history renal disease, miscarriages. Hematologic/Lymphatic: No abnormal bruising or bleeding, blood clots or swollen lymph nodes. Neurological: No history of headaches, seizure or focal weakness. No history of neuropathies, paresthesias or hyperesthesias, facial droop, diplopia  Psychiatric: No history of bipolar disease, anxiety  Endocrine: Denies any polyuria, polydipsia and osteoporosis  Allergic/Immunologic: No nasal congestion or hives. I have reviewed patients Past medical History, Social History and Family History as mentioned in her chart and this remains unchanged fromprevious. Past Medical History:   Diagnosis Date    Depression     Fibromyalgia     Preeclampsia      History reviewed. No pertinent surgical history. Social History     Socioeconomic History    Marital status: Single     Spouse name: Not on file    Number of children: Not on file    Years of education: Not on file    Highest education level: Not on file   Occupational History    Not on file   Tobacco Use    Smoking status: Never Smoker    Smokeless tobacco: Never Used   Vaping Use    Vaping Use: Never used   Substance and Sexual Activity    Alcohol use: Never    Drug use: Never    Sexual activity: Yes     Partners: Male   Other Topics Concern    Not on file   Social History Narrative    Not on file     Social Determinants of Health     Financial Resource Strain:     Difficulty of Paying Living Expenses:    Food Insecurity:     Worried About Running Out of Food in the Last Year:     [address] in the Last Year:    Transportation Needs:     Lack of Transportation (Medical):      Lack of Transportation (Non-Medical):    Physical Activity:     Days of Exercise per Week:     Minutes of Exercise per Session: Stress:     Feeling of Stress :    Social Connections:     Frequency of Communication with Friends and Family:     Frequency of Social Gatherings with Friends and Family:     Attends [employer] Services:     Active Member of Clubs or Organizations:     Attends Club or Organization Meetings:     Marital Status:    Intimate Partner Violence:     Fear of Current or Ex-Partner:     Emotionally Abused:     Physically Abused:     Sexually Abused:      Family History   Problem Relation Age of Onset    Anemia Mother     Heart Disease Father          PHYSICAL EXAMINATION:    Vitals:    11/01/21 1301   BP: 110/70   Pulse: 86   Weight: 143 lb 12.8 oz (65.2 kg)     Physical Exam  Constitutional:  Well developed, well nourished, no acute distress, non-toxic appearance   Musculoskeletal:                                          Right            Left                                   Tender Tender    Costochondral  + +   Low cervical + +   suboccipital + +   Trapezius  + +   Supraspinatus  + +   Lateral epicondyl + +   Gluteal + +   Greater trochanter  + +   knees + +     RIGHT  Swell  Tender  ROM  LEFT  Swell  Tender  ROM    DIP2  0  0  FULL   0  0  FULL    DIP3  0  0  FULL   0  0  FULL    DIP4  0  0  FULL   0  0  FULL    DIP5  0  0  FULL   0  0  FULL    PIP1  0  + FULL   0  + FULL    PIP2  0  + FULL   0  + FULL    PIP3  0  + FULL   0  + FULL    PIP4  0  + FULL   0  + FULL    PIP5  0  + FULL   0  + FULL    MCP1  0  0  FULL   0  0  FULL    MCP2  0  0  FULL   0  0  FULL    MCP3  0  0  FULL   0  0  FULL    MCP4  0  0  FULL   0  0  FULL    MCP5  0  0  FULL   0  0  FULL    Wrist  0  + FULL   0  + FULL    Elbow  0  + FULL   0  + FULL    Shouldr  0  0  FULL   0  0  FULL    Hip  0  0  FULL   0  0  FULL    Knee  0  0  FULL   0  0  FULL    Ankle  0  0  FULL   0  0  FULL    MTP1  0  0  FULL   0  0  FULL    MTP2  0  0  FULL   0  0  FULL    MTP3  0  0  FULL   0  0  FULL    MTP4  0  0  FULL   0  0  FULL    MTP5  0  0  FULL   0  0 FULL    IP1  0  0  FULL   0  0  FULL    IP2  0  0  FULL   0  0  FULL    IP3  0  0  FULL   0  0  FULL    IP4  0  0  FULL   0  0  FULL    IP5  0  0  FULL   0 0 FULL       Ambulates without assistance, normal gait  Neck: Full ROM, no tenderness,supple   Back- no tenderness. Eyes:  PERRL, extra ocular movements intact, conjunctiva normal   HEENT:  Atraumatic, normocephalic, external ears normal, oropharynx moist, no pharyngeal exudates. Respiratory:  No respiratory distress  GI:  Soft, nondistended, normal bowel sounds, nontender, noorganomegaly, no mass, no rebound, no guarding   :  No costovertebral angle tenderness   Integument:  Well hydrated, no rash or telangiectasias  Lymphatic:  No lymphadenopathy noted   Neurologic:   Alert & oriented x 3, CN 2-12 normal, no focal deficits noted. Sensations Intact. Muscle strength 5/5 proximallyand distally in upper and lower extremities.    Psychiatric:  Speech and behavior appropriate               LABS AND IMAGING  Outside data reviewed and in HPI    Lab Results   Component Value Date    WBC 7.7 08/17/2021    RBC 4.26 08/17/2021    HGB 13.5 08/17/2021    HCT 40.4 08/17/2021     08/17/2021    MCV 94.9 08/17/2021    MCH 31.8 08/17/2021    MCHC 33.5 08/17/2021    RDW 13.0 08/17/2021    LYMPHOPCT 21.4 08/17/2021    MONOPCT 5.8 08/17/2021    BASOPCT 0.5 08/17/2021    MONOSABS 0.5 08/17/2021    LYMPHSABS 1.7 08/17/2021    EOSABS 0.3 08/17/2021    BASOSABS 0.0 08/17/2021       Chemistry        Component Value Date/Time     08/17/2021 1034    K 4.1 08/17/2021 1034    CL 99 08/17/2021 1034    CO2 26 08/17/2021 1034    BUN 9 08/17/2021 1034    CREATININE 1.0 08/17/2021 1034        Component Value Date/Time    CALCIUM 10.0 08/17/2021 1034    ALKPHOS 79 08/17/2021 1034    AST 17 08/17/2021 1034    ALT 22 08/17/2021 1034    BILITOT 0.7 08/17/2021 1034          Lab Results   Component Value Date    SEDRATE 14 06/24/2019     Lab Results   Component Value Date    CRP 0.5 06/24/2019     Lab Results   Component Value Date    VIRGIE Negative 06/24/2019     Lab Results   Component Value Date    RF <10.0 06/24/2019     Lab Results   Component Value Date    VIRGIE Negative 06/24/2019     No results found for: DSDNAG, DSDNAIGGIFA  No results found for: Dorrene Many  No results found for: SMAB, RNPAB  No results found for: CENTABIGG  No results found for: C3, C4, ACE  Lab Results   Component Value Date    VITD25 18.7 06/24/2019     No results found for: Clara Sibley  Lab Results   Component Value Date    DTZYMAYK62 559 06/24/2019     Lab Results   Component Value Date    TSHFT4 3.00 06/24/2019     Lab Results   Component Value Date    VITD25 18.7 (L) 06/24/2019       ASSESSMENT AND PLAN      Assessment/Plan:      ASSESSMENT:    1. Fibromyalgia        PLAN:   1. Fibromyalgia  Fibromyalgia is a non-life threatening non-rheumatic disease. Discussed diagnosis, pathophysiology and management options with the patient. I discussed various treatment options with the patient including the medical management and physical therapy/aquatic therapy as well as self exercises. Went over various FDA approved (cymbalta, lyrica, gabapentin, sevalla) and non-fda approved medications (muscle relaxants, ssri's) with the patient.  Written material was provided to the patient on fibromyalgia.   -Continues to be symptomatic  Continue gabapentin 900 mg 3 times a day  Intolerant to Cymbalta [worsening depression]  Increase amitriptyline to 50 mg at bedtime  Start water therapy  -Strongly emphasized on low impact aerobic and stretching exercises including biking, swimming, walking, yoga or tiachi classes  -deep breathing and relaxation exercises   -mindfulness techniques  -Counseled on Sleep hygiene   -Advised to drink 64 oz of water   -Limit caffeine intake to 8 oz/day       Time spent with the patient 30 minutes and half of the time spent with counseling/coordination of care    The patient indicates understanding of these issues and agrees with the plan. Return in about 6 months (around 5/1/2022). The risks and benefits of my recommendations, as well as other treatment options, benefits and side effects werediscussed with the patient. All questions were answered. ######################################################################    I thank you for giving me theopportunity to participate in [facility] [place]. If you have any questions or concerns please feel free to contact me. I look forward to following  Kenia along with you. Electronically signed by: Ian Jones MD, MD, 11/1/2021 1:34 PM    Documentation was done using voice recognition dragon software. Every effort was made to ensure accuracy;however, inadvertent unintentional computerized transcription errors may be present.

## 2021-11-01 NOTE — PATIENT INSTRUCTIONS
Labs   Increase elavil to 50 mg at night   Continue gabapentin   Refer to aquatic therapy   -Strongly emphasized on low impact aerobic and stretching exercises including biking, swimming, walking, yoga or tiachi classes  -deep breathing and relaxation exercises   -mindfulness techniques  -Counseled on Sleep hygiene   -Advised to drink 64 oz of water   -Limit caffeine intake to 8 oz/day

## 2022-01-01 DIAGNOSIS — B00.9 HERPES SIMPLEX: ICD-10-CM

## 2022-01-01 DIAGNOSIS — E55.9 VITAMIN D DEFICIENCY: ICD-10-CM

## 2022-01-03 RX ORDER — ACYCLOVIR 400 MG/1
TABLET ORAL
Qty: 60 TABLET | Refills: 2 | Status: SHIPPED | OUTPATIENT
Start: 2022-01-03 | End: 2022-08-22

## 2022-01-03 RX ORDER — CHOLECALCIFEROL (VITAMIN D3) 125 MCG
CAPSULE ORAL
Qty: 30 TABLET | Refills: 5 | Status: SHIPPED | OUTPATIENT
Start: 2022-01-03 | End: 2022-09-22 | Stop reason: SDUPTHER

## 2022-01-27 DIAGNOSIS — M79.7 FIBROMYALGIA: ICD-10-CM

## 2022-01-27 DIAGNOSIS — R60.9 EDEMA, UNSPECIFIED TYPE: ICD-10-CM

## 2022-01-27 RX ORDER — GABAPENTIN 300 MG/1
CAPSULE ORAL
Qty: 270 CAPSULE | Refills: 2 | Status: SHIPPED | OUTPATIENT
Start: 2022-01-27 | End: 2022-04-27

## 2022-01-27 RX ORDER — AMITRIPTYLINE HYDROCHLORIDE 10 MG/1
TABLET, FILM COATED ORAL
Qty: 90 TABLET | Refills: 2 | Status: SHIPPED | OUTPATIENT
Start: 2022-01-27 | End: 2022-04-27

## 2022-01-27 RX ORDER — HYDROCHLOROTHIAZIDE 12.5 MG/1
12.5 CAPSULE, GELATIN COATED ORAL DAILY
Qty: 30 CAPSULE | Refills: 5 | Status: SHIPPED | OUTPATIENT
Start: 2022-01-27 | End: 2022-07-19

## 2022-01-27 NOTE — TELEPHONE ENCOUNTER
Medication:   Requested Prescriptions     Pending Prescriptions Disp Refills    hydroCHLOROthiazide (MICROZIDE) 12.5 MG capsule [Pharmacy Med Name: HYDROCHLOROTHIAZIDE 12.5MG CAPSULES] 30 capsule 5     Sig: TAKE 1 CAPSULE BY MOUTH DAILY    amitriptyline (ELAVIL) 10 MG tablet [Pharmacy Med Name: AMITRIPTYLINE 10MG TABLETS] 90 tablet 2     Sig: TAKE 3 TABLETS BY MOUTH EVERY NIGHT AT BEDTIME        Last Filled:      Patient Phone Number: [phone] (home)     Last appt: 8/4/2021   Next appt: Visit date not found    Last OARRS: No flowsheet data found.

## 2022-03-07 ENCOUNTER — TELEMEDICINE (OUTPATIENT)
Dept: FAMILY MEDICINE CLINIC | Age: 37
End: 2022-03-07
Payer: MEDICAID

## 2022-03-07 DIAGNOSIS — I10 BENIGN ESSENTIAL HTN: Primary | ICD-10-CM

## 2022-03-07 DIAGNOSIS — F41.9 ANXIETY: ICD-10-CM

## 2022-03-07 DIAGNOSIS — M79.7 FIBROMYALGIA: ICD-10-CM

## 2022-03-07 PROCEDURE — G8427 DOCREV CUR MEDS BY ELIG CLIN: HCPCS | Performed by: FAMILY MEDICINE

## 2022-03-07 PROCEDURE — 99214 OFFICE O/P EST MOD 30 MIN: CPT | Performed by: FAMILY MEDICINE

## 2022-03-07 SDOH — ECONOMIC STABILITY: FOOD INSECURITY: WITHIN THE PAST 12 MONTHS, THE FOOD YOU BOUGHT JUST DIDN'T LAST AND YOU DIDN'T HAVE MONEY TO GET MORE.: NEVER TRUE

## 2022-03-07 SDOH — ECONOMIC STABILITY: FOOD INSECURITY: WITHIN THE PAST 12 MONTHS, YOU WORRIED THAT YOUR FOOD WOULD RUN OUT BEFORE YOU GOT MONEY TO BUY MORE.: NEVER TRUE

## 2022-03-07 ASSESSMENT — SOCIAL DETERMINANTS OF HEALTH (SDOH): HOW HARD IS IT FOR YOU TO PAY FOR THE VERY BASICS LIKE FOOD, HOUSING, MEDICAL CARE, AND HEATING?: NOT HARD AT ALL

## 2022-03-07 NOTE — PROGRESS NOTES
Subjective:      Patient ID: Ashley Patel is a 43 y.o. female. HPI      Here for follow up from ED visit  Reason for visit: worsening anxiety/fibromyalgia  Diagnosis given: panic d/o   Treatment: ativan   Work up: blood work , ekg (reviewed and interpreted for today's apt )   Recommended follow up: prn   Now with following symptoms     Hypertension    bp at home \"was little high\" (bp at hospital 128/71) , denies  ha, visual changes, syncope, presyncope, cp, sob, palpitations, edema, carlin, orthopnea, pnd,  Compliant with medication, no secondary effects   Tx: hctz     Anxiety  Patient is here for evaluation of anxiety and worsening fibromyalgia. She has the following anxiety symptoms: difficulty concentrating, fatigue, feelings of losing control, palpitations, racing thoughts, shortness of breath. Onset of symptoms was approximately several months ago. Symptoms have been gradually worsening since that time. She denies current suicidal and homicidal ideation. [place] Possible organic causes contributing are: fibromyalgia. Risk factors: negative life event stress at home, Pandemia, her own health  Previous treatment includes none. She complains of the following medication side effects: none. Fibromyalgia   Pt sees a rheumatologist but feels her body aches are getting worse. Current treatment: gabapentin tid  Intolerant to Cymbalta \"bad thoughts\"       Review of Systems    Objective:   Physical Exam  Constitutional:       General: She is not in acute distress. Appearance: Normal appearance. She is not ill-appearing, toxic-appearing or diaphoretic. HENT:      Head: Normocephalic and atraumatic. Pulmonary:      Effort: No respiratory distress. Musculoskeletal:      Cervical back: Normal range of motion. Neurological:      General: No focal deficit present. Mental Status: She is alert and oriented to person, place, and time. Mental status is at baseline.    Psychiatric:         Attention and Perception: Attention normal.         Mood and Affect: Mood is anxious. Affect is flat. Speech: Speech normal.         Behavior: Behavior normal. Behavior is not slowed. Behavior is cooperative. Thought Content: Thought content normal. Thought content does not include homicidal or suicidal ideation. Cognition and Memory: Cognition normal.         Judgment: Judgment normal.         Assessment:       Diagnosis Orders   1. Benign essential HTN     2. Anxiety     3. Fibromyalgia             Plan:      1. Continue same medications no changes needed at this time   bp at ED stable    2. Deteriorated  Discussed tx options  She will call insurance for approved psychologist   She may benefit from ssri     3.  Fu with rheumatology  Continue gabapentin             Lorenza Conn MD

## 2022-04-27 DIAGNOSIS — M79.7 FIBROMYALGIA: ICD-10-CM

## 2022-04-27 RX ORDER — AMITRIPTYLINE HYDROCHLORIDE 10 MG/1
TABLET, FILM COATED ORAL
Qty: 90 TABLET | Refills: 2 | Status: SHIPPED | OUTPATIENT
Start: 2022-04-27 | End: 2022-07-19

## 2022-04-27 RX ORDER — GABAPENTIN 300 MG/1
CAPSULE ORAL
Qty: 270 CAPSULE | Refills: 2 | Status: SHIPPED | OUTPATIENT
Start: 2022-04-27 | End: 2022-06-20 | Stop reason: SDUPTHER

## 2022-04-27 NOTE — TELEPHONE ENCOUNTER
Medication:   Requested Prescriptions     Pending Prescriptions Disp Refills    amitriptyline (ELAVIL) 10 MG tablet [Pharmacy Med Name: AMITRIPTYLINE 10MG TABLETS] 90 tablet 2     Sig: TAKE 3 TABLETS BY MOUTH EVERY NIGHT AT BEDTIME        Last Filled:      Patient Phone Number: [phone] (home)     Last appt: 3/7/2022   Next appt: Visit date not found    Last OARRS: No flowsheet data found.

## 2022-06-20 ENCOUNTER — HOSPITAL ENCOUNTER (OUTPATIENT)
Age: 37
Discharge: HOME | End: 2022-06-20
Payer: MEDICAID

## 2022-06-20 ENCOUNTER — HOSPITAL ENCOUNTER (OUTPATIENT)
Dept: GENERAL RADIOLOGY | Age: 37
Discharge: HOME | End: 2022-06-20
Payer: MEDICAID

## 2022-06-20 ENCOUNTER — PATIENT MESSAGE (OUTPATIENT)
Dept: RHEUMATOLOGY | Age: 37
End: 2022-06-20

## 2022-06-20 ENCOUNTER — OFFICE VISIT (OUTPATIENT)
Dept: RHEUMATOLOGY | Age: 37
End: 2022-06-20
Payer: MEDICAID

## 2022-06-20 VITALS — BODY MASS INDEX: 28.71 KG/M2 | DIASTOLIC BLOOD PRESSURE: 88 MMHG | SYSTOLIC BLOOD PRESSURE: 130 MMHG | WEIGHT: 147 LBS

## 2022-06-20 DIAGNOSIS — M79.7 FIBROMYALGIA: Primary | ICD-10-CM

## 2022-06-20 DIAGNOSIS — M25.50 CHRONIC JOINT PAIN: ICD-10-CM

## 2022-06-20 DIAGNOSIS — G89.29 CHRONIC JOINT PAIN: ICD-10-CM

## 2022-06-20 DIAGNOSIS — M65.4 DE QUERVAIN'S DISEASE (TENOSYNOVITIS): ICD-10-CM

## 2022-06-20 DIAGNOSIS — M79.671 RIGHT FOOT PAIN: ICD-10-CM

## 2022-06-20 DIAGNOSIS — M79.7 FIBROMYALGIA: ICD-10-CM

## 2022-06-20 LAB
A/G RATIO: 2 (ref 1.1–2.2)
ALBUMIN SERPL-MCNC: 4.9 G/DL (ref 3.4–5)
ALP BLD-CCNC: 61 U/L (ref 40–129)
ALT SERPL-CCNC: 22 U/L (ref 10–40)
ANION GAP SERPL CALCULATED.3IONS-SCNC: 13 MMOL/L (ref 3–16)
AST SERPL-CCNC: 19 U/L (ref 15–37)
BASOPHILS ABSOLUTE: 0 K/UL (ref 0–0.2)
BASOPHILS RELATIVE PERCENT: 0.4 %
BILIRUB SERPL-MCNC: 0.4 MG/DL (ref 0–1)
BUN BLDV-MCNC: 10 MG/DL (ref 7–20)
CALCIUM SERPL-MCNC: 10 MG/DL (ref 8.3–10.6)
CHLORIDE BLD-SCNC: 102 MMOL/L (ref 99–110)
CO2: 25 MMOL/L (ref 21–32)
CREAT SERPL-MCNC: 0.8 MG/DL (ref 0.6–1.1)
EOSINOPHILS ABSOLUTE: 0.2 K/UL (ref 0–0.6)
EOSINOPHILS RELATIVE PERCENT: 2.6 %
GFR AFRICAN AMERICAN: >60
GFR NON-AFRICAN AMERICAN: >60
GLUCOSE BLD-MCNC: 86 MG/DL (ref 70–99)
HCT VFR BLD CALC: 37.2 % (ref 36–48)
HEMOGLOBIN: 12.6 G/DL (ref 12–16)
LYMPHOCYTES ABSOLUTE: 1.6 K/UL (ref 1–5.1)
LYMPHOCYTES RELATIVE PERCENT: 22.6 %
MCH RBC QN AUTO: 33 PG (ref 26–34)
MCHC RBC AUTO-ENTMCNC: 34 G/DL (ref 31–36)
MCV RBC AUTO: 97 FL (ref 80–100)
MONOCYTES ABSOLUTE: 0.4 K/UL (ref 0–1.3)
MONOCYTES RELATIVE PERCENT: 5 %
NEUTROPHILS ABSOLUTE: 5 K/UL (ref 1.7–7.7)
NEUTROPHILS RELATIVE PERCENT: 69.4 %
PDW BLD-RTO: 12.5 % (ref 12.4–15.4)
PLATELET # BLD: 240 K/UL (ref 135–450)
PMV BLD AUTO: 8.5 FL (ref 5–10.5)
POTASSIUM SERPL-SCNC: 4.8 MMOL/L (ref 3.5–5.1)
RBC # BLD: 3.83 M/UL (ref 4–5.2)
SODIUM BLD-SCNC: 140 MMOL/L (ref 136–145)
TOTAL PROTEIN: 7.4 G/DL (ref 6.4–8.2)
WBC # BLD: 7.2 K/UL (ref 4–11)

## 2022-06-20 PROCEDURE — 73130 X-RAY EXAM OF HAND: CPT

## 2022-06-20 PROCEDURE — 20550 NJX 1 TENDON SHEATH/LIGAMENT: CPT | Performed by: INTERNAL MEDICINE

## 2022-06-20 PROCEDURE — 1036F TOBACCO NON-USER: CPT | Performed by: INTERNAL MEDICINE

## 2022-06-20 PROCEDURE — 73630 X-RAY EXAM OF FOOT: CPT

## 2022-06-20 PROCEDURE — G8419 CALC BMI OUT NRM PARAM NOF/U: HCPCS | Performed by: INTERNAL MEDICINE

## 2022-06-20 PROCEDURE — 99214 OFFICE O/P EST MOD 30 MIN: CPT | Performed by: INTERNAL MEDICINE

## 2022-06-20 PROCEDURE — G8427 DOCREV CUR MEDS BY ELIG CLIN: HCPCS | Performed by: INTERNAL MEDICINE

## 2022-06-20 RX ORDER — GABAPENTIN 300 MG/1
CAPSULE ORAL
Qty: 270 CAPSULE | Refills: 2 | Status: SHIPPED | OUTPATIENT
Start: 2022-06-20 | End: 2022-08-25

## 2022-06-20 RX ADMIN — TRIAMCINOLONE ACETONIDE 20 MG: 40 INJECTION, SUSPENSION INTRA-ARTICULAR; INTRAMUSCULAR at 07:31

## 2022-06-20 RX ADMIN — LIDOCAINE HYDROCHLORIDE 1 ML: 10 INJECTION, SOLUTION INFILTRATION; PERINEURAL at 07:30

## 2022-06-20 NOTE — PROGRESS NOTES
2022   Patient Name: Abel Oliveira  : 1985  Medical Record: [id number]    MEDICATIONS  Current Outpatient Medications   Medication Sig Dispense Refill    gabapentin (NEURONTIN) 300 MG capsule Take 3 capsules 3 times a day. 270 capsule 2    amitriptyline (ELAVIL) 10 MG tablet TAKE 3 TABLETS BY MOUTH EVERY NIGHT AT BEDTIME 90 tablet 2    hydroCHLOROthiazide (MICROZIDE) 12.5 MG capsule TAKE 1 CAPSULE BY MOUTH DAILY 30 capsule 5    Cholecalciferol (VITAMIN D3) 50 MCG (2000 UT) TABS TAKE 1 TABLET BY MOUTH DAILY 30 tablet 5    acyclovir (ZOVIRAX) 400 MG tablet TAKE 1 TABLET(400 MG) BY MOUTH TWICE DAILY 60 tablet 2    amitriptyline (ELAVIL) 50 MG tablet Take 1 tab at night 30 tablet 5    fluticasone (FLONASE) 50 MCG/ACT nasal spray 1 spray by Each Nostril route daily 1 Bottle 0    ibuprofen (ADVIL;MOTRIN) 600 MG tablet TAKE 1 TABLET BY MOUTH FOUR TIMES DAILY AS NEEDED FOR PAIN 120 tablet 0    ACETAMINOPHEN EXTRA STRENGTH 500 MG tablet TAKE 1 TABLET BY MOUTH FOUR TIMES DAILY AS NEEDED FOR PAIN 120 tablet 0     No current facility-administered medications for this visit. ALLERGIES  No Known Allergies      Comments  No specialty comments available. Background history:  Abel Oliveira is a 43 y.o. female who is being seen for follow up evaluation of  fibromyalgia. She was diagnosed with fibromyalgia in 2016. She has chronic widespread musculoskeletal pain involving upper and lower body on both sides of the midline. Symptoms are progressively getting worse. She describes her pain is constant, 8-10 out of 10, aching burning, sharp, without any significant relieving or aggravating factors. She wakes up frequently at night due to pain. Sleep is not refreshing. She also has fatigue. She has anxiety and depression. She gave birth to a baby girl 3 months ago. She had preeclampsia during pregnancy. She does not breast-feed. She has tried tramadol in the past with some benefit.   She was also on Cymbalta briefly which made her feel sad/heavy bleeding. She has not tried any other treatment modalities. She takes amitriptyline as needed but it makes her too sleepy in the morning. She denies any joint swelling. She denies any family history of lupus or rheumatoid arthritis. She has also tried ibuprofen and Tylenol without any significant benefit. She denies psoriasis, inflammatory bowel disease, inflammatory back pain, dactylitis, enthesitis, tenosynovitis. She was referred to aquatic therapy by PCP but has not been there yet. Interim history: Presents for follow-up of fibromyalgia. She is on gabapentin 900 mg 3 times day. She is also on amitriptyline 80 mg at night. She reports improvement in muscle pain on gabapentin and amitriptyline. She is complaining of pain in the right and left wrist area which is worse on the right wrist.  She also has pain and tenderness in the right first MTP joint. She takes ibuprofen 600 mg twice a day and Tylenol as needed which helps. She  has fatigue. She has not been to the aquatic therapy yet. She denies any side effects with gabapentin or amitriptyline. She denies any recent fevers or infections. She could not tolerate Cymbalta. HPI  Review of Systems    REVIEW OF SYSTEMS:   Constitutional: No unanticipated weight loss or fevers. Integumentary: No rash, photosensitivity, malar rash, livedo reticularis, alopecia and Raynaud's symptoms, sclerodactyly, skin tightening  Eyes: negative for visual disturbance and persistent redness, discharge from eyes   ENT: - No tinnitus, loss of hearing, vertigo, or recurrent ear infections.  - No history of nasal/oral ulcers. - No history of dry eyes/dry mouth  Cardiovascular: No history of pericarditis, chest pain or murmur or palpitations  Respiratory: No shortness of breath, cough or history of interstitial lung disease. No history of pleurisy.  No history of tuberculosis or atypical infections. Gastrointestinal: No history of heart burn, dysphagia or esophageal dysmotility. No change in bowel habits or any inflammatory bowel disease. Genitourinary: No history renal disease, miscarriages. Hematologic/Lymphatic: No abnormal bruising or bleeding, blood clots or swollen lymph nodes. Neurological: No history of headaches, seizure or focal weakness. No history of neuropathies, paresthesias or hyperesthesias, facial droop, diplopia  Psychiatric: No history of bipolar disease, anxiety  Endocrine: Denies any polyuria, polydipsia and osteoporosis  Allergic/Immunologic: No nasal congestion or hives. I have reviewed patients Past medical History, Social History and Family History as mentioned in her chart and this remains unchanged fromprevious. Past Medical History:   Diagnosis Date    Depression     Fibromyalgia     Preeclampsia      No past surgical history on file. Social History     Socioeconomic History    Marital status: Single     Spouse name: Not on file    Number of children: Not on file    Years of education: Not on file    Highest education level: Not on file   Occupational History    Not on file   Tobacco Use    Smoking status: Never Smoker    Smokeless tobacco: Never Used   Vaping Use    Vaping Use: Never used   Substance and Sexual Activity    Alcohol use: Never    Drug use: Never    Sexual activity: Yes     Partners: Male   Other Topics Concern    Not on file   Social History Narrative    Not on file     Social Determinants of Health     Financial Resource Strain: Low Risk     Difficulty of Paying Living Expenses: Not hard at all   Food Insecurity: No Food Insecurity    Worried About Running Out of Food in the Last Year: Never true    [address] in the Last Year: Never true   Transportation Needs:     Lack of Transportation (Medical): Not on file    Lack of Transportation (Non-Medical):  Not on file   Physical Activity:     Days of Exercise per Week: Not on file    Minutes of Exercise per Session: Not on file   Stress:     Feeling of Stress : Not on file   Social Connections:     Frequency of Communication with Friends and Family: Not on file    Frequency of Social Gatherings with Friends and Family: Not on file    Attends [employer] Services: Not on file    Active Member of Clubs or Organizations: Not on file    Attends Club or Organization Meetings: Not on file    Marital Status: Not on file   Intimate Partner Violence:     Fear of Current or Ex-Partner: Not on file    Emotionally Abused: Not on file    Physically Abused: Not on file    Sexually Abused: Not on file   Housing Stability:     Unable to Pay for Housing in the Last Year: Not on file    Number of Places Lived in the Last Year: Not on file    Unstable Housing in the Last Year: Not on file     Family History   Problem Relation Age of Onset    Anemia Mother     Heart Disease Father          PHYSICAL EXAMINATION:    Vitals:    06/20/22 1420   BP: 130/88   Site: Left Upper Arm   Position: Sitting   Cuff Size: Medium Adult   Weight: 147 lb (66.7 kg)     Physical Exam  Constitutional:  Well developed, well nourished, no acute distress, non-toxic appearance   Musculoskeletal:                                          Right            Left                                   Tender Tender    Costochondral  + +   Low cervical + +   suboccipital + +   Trapezius  + +   Supraspinatus  + +   Lateral epicondyl + +   Gluteal + +   Greater trochanter  + +   knees + +     RIGHT  Swell  Tender  ROM  LEFT  Swell  Tender  ROM    DIP2  0  0  FULL   0  0  FULL    DIP3  0  0  FULL   0  0  FULL    DIP4  0  0  FULL   0  0  FULL    DIP5  0  0  FULL   0  0  FULL    PIP1  0  + FULL   0  + FULL    PIP2  0  + FULL   0  + FULL    PIP3  0  + FULL   0  + FULL    PIP4  0  + FULL   0  + FULL    PIP5  0  + FULL   0  + FULL    MCP1  0  0  FULL   0  0  FULL    MCP2  0  0  FULL   0  0  FULL    MCP3  0  0  FULL   0  0  FULL MCP4  0  0  FULL   0  0  FULL    MCP5  0  0  FULL   0  0  FULL    Wrist  0  + FULL   0  + FULL    Elbow  0  + FULL   0  + FULL    Shouldr  0  0  FULL   0  0  FULL    Hip  0  0  FULL   0  0  FULL    Knee  0  0  FULL   0  0  FULL    Ankle  0  0  FULL   0  0  FULL    MTP1  0  ++  bony change  0   +  bony change   MTP2  0  0  FULL   0  0  FULL    MTP3  0  0  FULL   0  0  FULL    MTP4  0  0  FULL   0  0  FULL    MTP5  0  0  FULL   0  0  FULL    IP1  0  0  FULL   0  0  FULL    IP2  0  0  FULL   0  0  FULL    IP3  0  0  FULL   0  0  FULL    IP4  0  0  FULL   0  0  FULL    IP5  0  0  FULL   0 0 FULL     Tenderness to palpation bilateral CMC joint/wrist area with positive Finkelstein test on the right side  Ambulates without assistance, normal gait  Neck: Full ROM, no tenderness,supple   Back- no tenderness. Eyes:  PERRL, extra ocular movements intact, conjunctiva normal   HEENT:  Atraumatic, normocephalic, external ears normal, oropharynx moist, no pharyngeal exudates. Respiratory:  No respiratory distress  GI:  Soft, nondistended, normal bowel sounds, nontender, noorganomegaly, no mass, no rebound, no guarding   :  No costovertebral angle tenderness   Integument:  Well hydrated, no rash or telangiectasias  Lymphatic:  No lymphadenopathy noted   Neurologic:   Alert & oriented x 3, CN 2-12 normal, no focal deficits noted. Sensations Intact. Muscle strength 5/5 proximallyand distally in upper and lower extremities.    Psychiatric:  Speech and behavior appropriate               LABS AND IMAGING  Outside data reviewed and in HPI    Lab Results   Component Value Date    WBC 7.7 08/17/2021    RBC 4.26 08/17/2021    HGB 13.5 08/17/2021    HCT 40.4 08/17/2021     08/17/2021    MCV 94.9 08/17/2021    MCH 31.8 08/17/2021    MCHC 33.5 08/17/2021    RDW 13.0 08/17/2021    LYMPHOPCT 21.4 08/17/2021    MONOPCT 5.8 08/17/2021    BASOPCT 0.5 08/17/2021    MONOSABS 0.5 08/17/2021    LYMPHSABS 1.7 08/17/2021    EOSABS 0.3 08/17/2021    BASOSABS 0.0 08/17/2021       Chemistry        Component Value Date/Time     11/01/2021 1341    K 4.7 11/01/2021 1341    CL 99 11/01/2021 1341    CO2 25 11/01/2021 1341    BUN 9 11/01/2021 1341    CREATININE 0.9 11/01/2021 1341        Component Value Date/Time    CALCIUM 9.7 11/01/2021 1341    ALKPHOS 58 11/01/2021 1341    AST 19 11/01/2021 1341    ALT 23 11/01/2021 1341    BILITOT 0.3 11/01/2021 1341          Lab Results   Component Value Date    SEDRATE 14 06/24/2019     Lab Results   Component Value Date    CRP 0.5 06/24/2019     Lab Results   Component Value Date    VIRGIE Negative 06/24/2019     Lab Results   Component Value Date    RF <10.0 06/24/2019     Lab Results   Component Value Date    VIRGIE Negative 06/24/2019     No results found for: DSDNAG, DSDNAIGGIFA  No results found for: SSAROAB, SSALAAB  No results found for: SMAB, RNPAB  No results found for: CENTABIGG  No results found for: C3, C4, ACE  Lab Results   Component Value Date    VITD25 18.7 06/24/2019     No results found for: Cy Carlos Alberto  Lab Results   Component Value Date    FKAFUMHV64 559 06/24/2019     Lab Results   Component Value Date    TSHFT4 3.00 06/24/2019     Lab Results   Component Value Date    VITD25 18.7 (L) 06/24/2019       ASSESSMENT AND PLAN      Assessment/Plan:      ASSESSMENT:    1. Fibromyalgia    2. Chronic joint pain    3. Right foot pain    4. De Quervain's disease (tenosynovitis)        PLAN:   1. Fibromyalgia  Fibromyalgia is a non-life threatening non-rheumatic disease. Discussed diagnosis, pathophysiology and management options with the patient. I discussed various treatment options with the patient including the medical management and physical therapy/aquatic therapy as well as self exercises. Went over various FDA approved (cymbalta, lyrica, gabapentin, sevalla) and non-fda approved medications (muscle relaxants, ssri's) with the patient.  Written material was provided to the patient on fibromyalgia. Stable  Continue gabapentin 900 mg 3 times a day and amitriptyline 80 mg at night  Intolerant to Cymbalta [worsening depression]  Start water therapy  -Strongly emphasized on low impact aerobic and stretching exercises including biking, swimming, walking, yoga or tiachi classes  -deep breathing and relaxation exercises   -mindfulness techniques  -Counseled on Sleep hygiene   -Advised to drink 64 oz of water   -Limit caffeine intake to 8 oz/day     2. Bilateral foot pain  Most likely mechanical pain with component of osteoarthritis and fibromyalgia contributing to the pain. I will obtain bilateral foot x-rays and refer to podiatry  Continue ibuprofen 600 mg twice a day    3. De Quervain's tenosynovitis-worse symptoms on the right side  I will do Kenalog/corticosteroid injection  Continue ibuprofen  Ice and activity modification     Diagnosis Orders   1. Fibromyalgia  gabapentin (NEURONTIN) 300 MG capsule    CBC with Auto Differential    Comprehensive Metabolic Panel    PT aquatic therapy   2. Chronic joint pain  XR HAND RIGHT (MIN 3 VIEWS)    XR HAND LEFT (MIN 3 VIEWS)    XR FOOT RIGHT (MIN 3 VIEWS)    XR FOOT LEFT (MIN 3 VIEWS)   3. Right foot pain  [facility] - Cheryl Shaw DPM, Podiatry, [place]   4. De Quervain's disease (tenosynovitis)  ME INJECT TENDON SHEATH/LIGAMENT     PROCEDURE NOTE    JOINT ASPIRATION/INJECTION  Right-sided de Quervain's tenosynovitis:  The patient was informed about the risk and benefits of the procedure, including the risk of infection, hemorrhage and skin hypopigmentation from the corticosteroids. After informed consent was obtained, using sterile technique, the right wrist/tendon area was prepped and chlorhexidine was used as local anesthetic. The area was entered and Kenalog 20 mg and 1 ml plain Lidocaine was then injected and the needle withdrawn. The procedure was well tolerated. No immediate complication noticed.   The patient is asked to continue to rest the joint for a few more days before resuming regular activities. Advised patient to watch for fever, increased swelling or persistent pain in the joint. Call or return to clinic prn if such symptoms occur or there is failure to improve as anticipated. The patient indicates understanding of these issues and agrees with the plan. Return in about 6 months (around 12/20/2022). The risks and benefits of my recommendations, as well as other treatment options, benefits and side effects werediscussed with the patient. All questions were answered. ######################################################################    I thank you for giving me theopportunity to participate in [facility]. If you have any questions or concerns please feel free to contact me. I look forward to following  Kenia along with you. Electronically signed by: Jacki Angeles MD, MD, 6/20/2022 2:48 PM    Documentation was done using voice recognition dragon software. Every effort was made to ensure accuracy;however, inadvertent unintentional computerized transcription errors may be present.

## 2022-06-21 RX ORDER — TRIAMCINOLONE ACETONIDE 40 MG/ML
20 INJECTION, SUSPENSION INTRA-ARTICULAR; INTRAMUSCULAR ONCE
Status: COMPLETED | OUTPATIENT
Start: 2022-06-21 | End: 2022-06-20

## 2022-06-21 RX ORDER — AMITRIPTYLINE HYDROCHLORIDE 50 MG/1
TABLET, FILM COATED ORAL
Qty: 30 TABLET | Refills: 5 | Status: SHIPPED | OUTPATIENT
Start: 2022-06-21 | End: 2022-09-22 | Stop reason: SDUPTHER

## 2022-06-21 RX ORDER — LIDOCAINE HYDROCHLORIDE 10 MG/ML
1 INJECTION, SOLUTION INFILTRATION; PERINEURAL ONCE
Status: COMPLETED | OUTPATIENT
Start: 2022-06-21 | End: 2022-06-20

## 2022-06-21 NOTE — TELEPHONE ENCOUNTER
From: Aaron Simon  Sent: 6/20/2022 7:31 PM EDT  To: [facility] Clinical Staff  Subject: Approval for medication    Yes I would like a refill please,   thank you!

## 2022-07-18 DIAGNOSIS — M79.7 FIBROMYALGIA: ICD-10-CM

## 2022-07-18 DIAGNOSIS — R60.9 EDEMA, UNSPECIFIED TYPE: ICD-10-CM

## 2022-07-18 DIAGNOSIS — J30.2 SEASONAL ALLERGIES: ICD-10-CM

## 2022-07-19 RX ORDER — AMITRIPTYLINE HYDROCHLORIDE 10 MG/1
TABLET, FILM COATED ORAL
Qty: 90 TABLET | Refills: 2 | Status: SHIPPED | OUTPATIENT
Start: 2022-07-19 | End: 2022-09-22

## 2022-07-19 RX ORDER — HYDROCHLOROTHIAZIDE 12.5 MG/1
12.5 CAPSULE, GELATIN COATED ORAL DAILY
Qty: 30 CAPSULE | Refills: 5 | Status: SHIPPED | OUTPATIENT
Start: 2022-07-19

## 2022-07-19 NOTE — TELEPHONE ENCOUNTER
Medication:   Requested Prescriptions     Pending Prescriptions Disp Refills    hydroCHLOROthiazide (MICROZIDE) 12.5 MG capsule [Pharmacy Med Name: HYDROCHLOROTHIAZIDE 12.5MG CAPSULES] 30 capsule 5     Sig: TAKE 1 CAPSULE BY MOUTH DAILY    amitriptyline (ELAVIL) 10 MG tablet [Pharmacy Med Name: AMITRIPTYLINE 10MG TABLETS] 90 tablet 2     Sig: TAKE 3 TABLETS BY MOUTH EVERY NIGHT AT BEDTIME        Last Filled:      Patient Phone Number: [phone] (home)     Last appt: 3/7/2022   Next appt: Visit date not found    Last OARRS: No flowsheet data found.

## 2022-08-21 DIAGNOSIS — B00.9 HERPES SIMPLEX: ICD-10-CM

## 2022-08-22 RX ORDER — ACYCLOVIR 400 MG/1
TABLET ORAL
Qty: 60 TABLET | Refills: 2 | Status: SHIPPED | OUTPATIENT
Start: 2022-08-22

## 2022-08-22 NOTE — TELEPHONE ENCOUNTER
Medication:   Requested Prescriptions     Pending Prescriptions Disp Refills    acyclovir (ZOVIRAX) 400 MG tablet [Pharmacy Med Name: ACYCLOVIR 400MG TABLETS] 60 tablet 2     Sig: TAKE 1 TABLET(400 MG) BY MOUTH TWICE DAILY        Last Filled:      Patient Phone Number: [phone] (home)     Last appt: 3/7/2022   Next appt: Visit date not found    Last OARRS: No flowsheet data found.

## 2022-08-25 DIAGNOSIS — M79.7 FIBROMYALGIA: ICD-10-CM

## 2022-08-25 RX ORDER — GABAPENTIN 300 MG/1
CAPSULE ORAL
Qty: 270 CAPSULE | Refills: 2 | Status: SHIPPED | OUTPATIENT
Start: 2022-08-25 | End: 2022-09-23

## 2022-09-01 ENCOUNTER — PATIENT MESSAGE (OUTPATIENT)
Dept: RHEUMATOLOGY | Age: 37
End: 2022-09-01

## 2022-09-01 DIAGNOSIS — M79.7 FIBROMYALGIA: ICD-10-CM

## 2022-09-01 NOTE — TELEPHONE ENCOUNTER
From: Herve Metzger  To: Dr. Tonya Guzman: 9/1/2022 12:53 PM EDT  Subject: Prescription change request    Hello there,   Yesterday I recieved a message from my primary doctor's office. A notice from my insurance stating a clinical concern due to two prescribers for Amitriptyline, one of 30mg from Dr. Richard Daley and 48 MG from Dr. Sierra Alves. I was instructed from here out, to recieve the Amitriptyline ONLY from my Rheumatologist.   In total I take 80mg at night. It has been very helpful for sleep, reducing pain and pelvic discomfort. Thank you for patience,   what will I have to do to recieve one prescription of Amitriptyline?

## 2022-09-22 DIAGNOSIS — E55.9 VITAMIN D DEFICIENCY: ICD-10-CM

## 2022-09-22 RX ORDER — CHOLECALCIFEROL (VITAMIN D3) 125 MCG
CAPSULE ORAL
Qty: 30 TABLET | Refills: 5 | Status: SHIPPED | OUTPATIENT
Start: 2022-09-22

## 2022-09-22 RX ORDER — AMITRIPTYLINE HYDROCHLORIDE 75 MG/1
TABLET, FILM COATED ORAL
Qty: 90 TABLET | Refills: 1 | Status: SHIPPED | OUTPATIENT
Start: 2022-09-22

## 2022-10-24 DIAGNOSIS — M79.7 FIBROMYALGIA: ICD-10-CM

## 2022-10-24 RX ORDER — AMITRIPTYLINE HYDROCHLORIDE 10 MG/1
TABLET, FILM COATED ORAL
Qty: 90 TABLET | Refills: 2 | Status: SHIPPED | OUTPATIENT
Start: 2022-10-24

## 2022-10-24 NOTE — TELEPHONE ENCOUNTER
Medication:   Requested Prescriptions     Pending Prescriptions Disp Refills    amitriptyline (ELAVIL) 10 MG tablet [Pharmacy Med Name: AMITRIPTYLINE 10MG TABLETS] 90 tablet 2     Sig: TAKE 3 TABLETS BY MOUTH EVERY NIGHT AT BEDTIME        Last Filled:      Patient Phone Number: [phone] (home)     Last appt: 3/7/2022   Next appt: Visit date not found    Last OARRS: No flowsheet data found.

## 2022-12-20 ENCOUNTER — OFFICE VISIT (OUTPATIENT)
Dept: RHEUMATOLOGY | Age: 37
End: 2022-12-20
Payer: MEDICAID

## 2022-12-20 VITALS
WEIGHT: 144 LBS | DIASTOLIC BLOOD PRESSURE: 80 MMHG | SYSTOLIC BLOOD PRESSURE: 140 MMHG | HEIGHT: 60 IN | BODY MASS INDEX: 28.27 KG/M2

## 2022-12-20 DIAGNOSIS — M79.671 RIGHT FOOT PAIN: ICD-10-CM

## 2022-12-20 DIAGNOSIS — M79.7 FIBROMYALGIA: Primary | ICD-10-CM

## 2022-12-20 DIAGNOSIS — M65.4 DE QUERVAIN'S DISEASE (TENOSYNOVITIS): ICD-10-CM

## 2022-12-20 PROCEDURE — G8484 FLU IMMUNIZE NO ADMIN: HCPCS | Performed by: INTERNAL MEDICINE

## 2022-12-20 PROCEDURE — 99214 OFFICE O/P EST MOD 30 MIN: CPT | Performed by: INTERNAL MEDICINE

## 2022-12-20 PROCEDURE — 1036F TOBACCO NON-USER: CPT | Performed by: INTERNAL MEDICINE

## 2022-12-20 PROCEDURE — G8419 CALC BMI OUT NRM PARAM NOF/U: HCPCS | Performed by: INTERNAL MEDICINE

## 2022-12-20 PROCEDURE — 3074F SYST BP LT 130 MM HG: CPT | Performed by: INTERNAL MEDICINE

## 2022-12-20 PROCEDURE — 3078F DIAST BP <80 MM HG: CPT | Performed by: INTERNAL MEDICINE

## 2022-12-20 PROCEDURE — G8427 DOCREV CUR MEDS BY ELIG CLIN: HCPCS | Performed by: INTERNAL MEDICINE

## 2022-12-20 RX ORDER — ACETAMINOPHEN 500 MG
500 TABLET ORAL 4 TIMES DAILY PRN
Qty: 360 TABLET | Refills: 1 | Status: SHIPPED | OUTPATIENT
Start: 2022-12-20

## 2022-12-20 RX ORDER — SIMETHICONE 125 MG
125 TABLET,CHEWABLE ORAL 4 TIMES DAILY PRN
COMMUNITY
Start: 2019-04-07

## 2022-12-20 RX ORDER — LEVONORGESTREL AND ETHINYL ESTRADIOL 0.1-0.02MG
KIT ORAL
COMMUNITY
Start: 2022-12-04

## 2022-12-20 RX ORDER — IBUPROFEN 600 MG/1
TABLET ORAL
Qty: 90 TABLET | Refills: 5 | Status: SHIPPED | OUTPATIENT
Start: 2022-12-20

## 2022-12-20 RX ORDER — HYDROXYZINE PAMOATE 25 MG/1
25 CAPSULE ORAL 3 TIMES DAILY PRN
COMMUNITY
Start: 2022-12-12 | End: 2023-01-11

## 2022-12-20 NOTE — PROGRESS NOTES
2022   Patient Name: Annalee Trevizo  : 1985  Medical Record: [id number]    MEDICATIONS  Current Outpatient Medications   Medication Sig Dispense Refill    VIENVA 0.1-20 MG-MCG per tablet TAKE 1 TABLET BY MOUTH EVERY DAY      hydrOXYzine pamoate (VISTARIL) 25 MG capsule Take 25 mg by mouth 3 times daily as needed      simethicone (MYLICON) 388 MG chewable tablet Take 125 mg by mouth 4 times daily as needed      ibuprofen (ADVIL;MOTRIN) 600 MG tablet Take 1 tab by mouth 3 times a day 90 tablet 5    acetaminophen (TYLENOL) 500 MG tablet Take 1 tablet by mouth 4 times daily as needed for Pain 360 tablet 1    fluticasone (FLONASE) 50 MCG/ACT nasal spray 1 spray by Each Nostril route daily 1 each 3    Cholecalciferol (VITAMIN D3) 50 MCG ( UT) TABS 1 Tablet by mouth daily 30 tablet 5    amitriptyline (ELAVIL) 75 MG tablet Take 1 tab at night 90 tablet 1    gabapentin (NEURONTIN) 300 MG capsule TAKE 3 CAPSULES BY MOUTH THREE TIMES DAILY 270 capsule 2    acyclovir (ZOVIRAX) 400 MG tablet TAKE 1 TABLET(400 MG) BY MOUTH TWICE DAILY 60 tablet 2    hydroCHLOROthiazide (MICROZIDE) 12.5 MG capsule TAKE 1 CAPSULE BY MOUTH DAILY 30 capsule 5     No current facility-administered medications for this visit. ALLERGIES  No Known Allergies      Comments  No specialty comments available. Background history:  Annlaee Trevizo is a 43 y.o. female who is being seen for follow up evaluation of  fibromyalgia. She was diagnosed with fibromyalgia in 2016. She has chronic widespread musculoskeletal pain involving upper and lower body on both sides of the midline. Symptoms are progressively getting worse. She describes her pain is constant, 8-10 out of 10, aching burning, sharp, without any significant relieving or aggravating factors. She wakes up frequently at night due to pain. Sleep is not refreshing. She also has fatigue. She has anxiety and depression. She gave birth to a baby girl 3 months ago.   She had preeclampsia during pregnancy. She does not breast-feed. She has tried tramadol in the past with some benefit. She was also on Cymbalta briefly which made her feel sad/heavy bleeding. She has not tried any other treatment modalities. She takes amitriptyline as needed but it makes her too sleepy in the morning. She denies any joint swelling. She denies any family history of lupus or rheumatoid arthritis. She has also tried ibuprofen and Tylenol without any significant benefit. She denies psoriasis, inflammatory bowel disease, inflammatory back pain, dactylitis, enthesitis, tenosynovitis. She was referred to aquatic therapy by PCP but has not been there yet. Interim history: She presents for follow-up of fibromyalgia. She is on gabapentin 900 mg 3 times day and amitriptyline 75 mg at bedtime. She is complaining of pain in the right and left wrist area which is worse on the right wrist.  She had corticosteroid injection for the right de Quervain's tenosynovitis which lasted for only 4 months. She also has pain and tenderness in the right first MTP joint, second and third metatarsal area. She is scheduled to see a podiatrist.  She takes ibuprofen 600 mg twice a day and Tylenol as needed which helps. She  has fatigue. She denies any side effects with gabapentin or amitriptyline. She denies any recent fevers or infections. She could not tolerate Cymbalta. Bilateral hand and foot x-rays are normal without any degenerative changes or erosions. HPI  Review of Systems    REVIEW OF SYSTEMS:   Constitutional: No unanticipated weight loss or fevers. Integumentary: No rash, photosensitivity, malar rash, livedo reticularis, alopecia and Raynaud's symptoms, sclerodactyly, skin tightening  Eyes: negative for visual disturbance and persistent redness, discharge from eyes   ENT: - No tinnitus, loss of hearing, vertigo, or recurrent ear infections.  - No history of nasal/oral ulcers.   - No history of dry eyes/dry mouth  Cardiovascular: No history of pericarditis, chest pain or murmur or palpitations  Respiratory: No shortness of breath, cough or history of interstitial lung disease. No history of pleurisy. No history of tuberculosis or atypical infections. Gastrointestinal: No history of heart burn, dysphagia or esophageal dysmotility. No change in bowel habits or any inflammatory bowel disease. Genitourinary: No history renal disease, miscarriages. Hematologic/Lymphatic: No abnormal bruising or bleeding, blood clots or swollen lymph nodes. Neurological: No history of headaches, seizure or focal weakness. No history of neuropathies, paresthesias or hyperesthesias, facial droop, diplopia  Psychiatric: No history of bipolar disease, anxiety  Endocrine: Denies any polyuria, polydipsia and osteoporosis  Allergic/Immunologic: No nasal congestion or hives. I have reviewed patients Past medical History, Social History and Family History as mentioned in her chart and this remains unchanged fromprevious. Past Medical History:   Diagnosis Date    Depression     Fibromyalgia     Preeclampsia      No past surgical history on file.   Social History     Socioeconomic History    Marital status: Single     Spouse name: Not on file    Number of children: Not on file    Years of education: Not on file    Highest education level: Not on file   Occupational History    Not on file   Tobacco Use    Smoking status: Never    Smokeless tobacco: Never   Vaping Use    Vaping Use: Never used   Substance and Sexual Activity    Alcohol use: Never    Drug use: Never    Sexual activity: Yes     Partners: Male   Other Topics Concern    Not on file   Social History Narrative    Not on file     Social Determinants of Health     Financial Resource Strain: Low Risk     Difficulty of Paying Living Expenses: Not hard at all   Food Insecurity: No Food Insecurity    Worried About [address] [employer] in the Last Year: Never true    Ran Out of Food in the Last Year: Never true   Transportation Needs: Not on file   Physical Activity: Not on file   Stress: Not on file   Social Connections: Not on file   Intimate Partner Violence: Not on file   Housing Stability: Not on file     Family History   Problem Relation Age of Onset    Anemia Mother     Heart Disease Father          PHYSICAL EXAMINATION:    Vitals:    12/20/22 1507   BP: (!) 140/80   Site: Right Upper Arm   Position: Sitting   Cuff Size: Medium Adult   Weight: 144 lb (65.3 kg)   Height: 5' (1.524 m)     Physical Exam  Constitutional:  Well developed, well nourished, no acute distress, non-toxic appearance   Musculoskeletal:                                          Right            Left                                   Tender Tender    Costochondral  + +   Low cervical + +   suboccipital + +   Trapezius  + +   Supraspinatus  + +   Lateral epicondyl + +   Gluteal + +   Greater trochanter  + +   knees + +     RIGHT  Swell  Tender  ROM  LEFT  Swell  Tender  ROM    DIP2  0  0  FULL   0  0  FULL    DIP3  0  0  FULL   0  0  FULL    DIP4  0  0  FULL   0  0  FULL    DIP5  0  0  FULL   0  0  FULL    PIP1  0  + FULL   0  + FULL    PIP2  0  + FULL   0  + FULL    PIP3  0  + FULL   0  + FULL    PIP4  0  + FULL   0  + FULL    PIP5  0  + FULL   0  + FULL    MCP1  0  0  FULL   0  0  FULL    MCP2  0  0  FULL   0  0  FULL    MCP3  0  0  FULL   0  0  FULL    MCP4  0  0  FULL   0  0  FULL    MCP5  0  0  FULL   0  0  FULL    Wrist  0  + FULL   0  + FULL    Elbow  0  + FULL   0  + FULL    Shouldr  0  0  FULL   0  0  FULL    Hip  0  0  FULL   0  0  FULL    Knee  0  0  FULL   0  0  FULL    Ankle  0  0  FULL   0  0  FULL    MTP1  0  ++  bony change  0   +  bony change   MTP2  0  0  FULL   0  0  FULL    MTP3  0  0  FULL   0  0  FULL    MTP4  0  0  FULL   0  0  FULL    MTP5  0  0  FULL   0  0  FULL    IP1  0  0  FULL   0  0  FULL    IP2  0  0  FULL   0  0  FULL    IP3  0  0  FULL   0  0  FULL    IP4  0  0  FULL   0  0  FULL IP5  0  0  FULL   0 0 FULL     Tenderness to palpation bilateral CMC joint/wrist area with positive Finkelstein test on the right side  Tenderness to palpation right second and third metatarsal area  Ambulates without assistance, normal gait  Neck: Full ROM, no tenderness,supple   Back- no tenderness. Eyes:  PERRL, extra ocular movements intact, conjunctiva normal   HEENT:  Atraumatic, normocephalic, external ears normal, oropharynx moist, no pharyngeal exudates. Respiratory:  No respiratory distress  GI:  Soft, nondistended, normal bowel sounds, nontender, noorganomegaly, no mass, no rebound, no guarding   :  No costovertebral angle tenderness   Integument:  Well hydrated, no rash or telangiectasias  Lymphatic:  No lymphadenopathy noted   Neurologic:   Alert & oriented x 3, CN 2-12 normal, no focal deficits noted. Sensations Intact. Muscle strength 5/5 proximallyand distally in upper and lower extremities.    Psychiatric:  Speech and behavior appropriate               LABS AND IMAGING  Outside data reviewed and in HPI    Lab Results   Component Value Date/Time    WBC 7.2 06/20/2022 03:22 PM    RBC 3.83 06/20/2022 03:22 PM    HGB 12.6 06/20/2022 03:22 PM    HCT 37.2 06/20/2022 03:22 PM     06/20/2022 03:22 PM    MCV 97.0 06/20/2022 03:22 PM    MCH 33.0 06/20/2022 03:22 PM    MCHC 34.0 06/20/2022 03:22 PM    RDW 12.5 06/20/2022 03:22 PM    LYMPHOPCT 22.6 06/20/2022 03:22 PM    MONOPCT 5.0 06/20/2022 03:22 PM    BASOPCT 0.4 06/20/2022 03:22 PM    MONOSABS 0.4 06/20/2022 03:22 PM    LYMPHSABS 1.6 06/20/2022 03:22 PM    EOSABS 0.2 06/20/2022 03:22 PM    BASOSABS 0.0 06/20/2022 03:22 PM       Chemistry        Component Value Date/Time     06/20/2022 1522    K 4.8 06/20/2022 1522     06/20/2022 1522    CO2 25 06/20/2022 1522    BUN 10 06/20/2022 1522    CREATININE 0.8 06/20/2022 1522        Component Value Date/Time    CALCIUM 10.0 06/20/2022 1522    ALKPHOS 61 06/20/2022 1522    AST 19 06/20/2022 1522    ALT 22 06/20/2022 1522    BILITOT 0.4 06/20/2022 1522          Lab Results   Component Value Date    SEDRATE 14 06/24/2019     Lab Results   Component Value Date    CRP 0.5 06/24/2019     Lab Results   Component Value Date/Time    VIRGIE Negative 06/24/2019 10:21 AM     Lab Results   Component Value Date/Time    RF <10.0 06/24/2019 10:21 AM     Lab Results   Component Value Date/Time    VIRGIE Negative 06/24/2019 10:21 AM     No results found for: DSDNAG, DSDNAIGGIFA  No results found for: Joselito Anand  No results found for: SMAB, RNPAB  No results found for: CENTABIGG  No results found for: C3, C4, ACE  Lab Results   Component Value Date/Time    VITD25 18.7 06/24/2019 10:21 AM     No results found for: Yahir Woodward  Lab Results   Component Value Date    IDNAIZPH54 559 06/24/2019     Lab Results   Component Value Date    TSHFT4 3.00 06/24/2019     Lab Results   Component Value Date    VITD25 18.7 (L) 06/24/2019       Bilateral hand and foot x-rays 6/20/2022  Right hand:       1. No acute osseous abnormality. 2. No significant degenerative change. Left hand:       1. No acute osseous abnormality. 2. No significant degenerative change. Right foot:       1. No acute osseous abnormality. 2. No significant degenerative change. Left foot:       1. No acute osseous abnormality. 2. No significant degenerative change. ASSESSMENT AND PLAN      Assessment/Plan:      ASSESSMENT:    1. Fibromyalgia    2. De Quervain's disease (tenosynovitis)    3. Right foot pain        PLAN:   1. Fibromyalgia  2. De Quervain's disease (tenosynovitis)  -     Sharyle Decant, MD, Hand Surgery (Hand, Wrist, Upper Extremity), [facility]  -     ibuprofen (ADVIL;MOTRIN) 600 MG tablet; Take 1 tab by mouth 3 times a day, Disp-90 tablet, R-5Normal  3. Right foot pain  -     ibuprofen (ADVIL;MOTRIN) 600 MG tablet; Take 1 tab by mouth 3 times a day, Disp-90 tablet, R-5Normal   1.  Fibromyalgia  Fibromyalgia is a non-life threatening non-rheumatic disease. Discussed diagnosis, pathophysiology and management options with the patient. I discussed various treatment options with the patient including the medical management and physical therapy/aquatic therapy as well as self exercises. Went over various FDA approved (cymbalta, lyrica, gabapentin, sevalla) and non-fda approved medications (muscle relaxants, ssri's) with the patient. Written material was provided to the patient on fibromyalgia. Stable  Continue gabapentin 900 mg 3 times a day and amitriptyline 75 mg at bedtime  Intolerant to Cymbalta [worsening depression]  -Strongly emphasized on low impact aerobic and stretching exercises including biking, swimming, walking, yoga or tiachi classes  -deep breathing and relaxation exercises   -mindfulness techniques  -Counseled on Sleep hygiene   -Advised to drink 64 oz of water   -Limit caffeine intake to 8 oz/day     2. Bilateral foot pain  Worse on the right side. Most likely motor neuroma/metatarsalgia. Bilateral foot x-rays without any erosions or degenerative changes. Scheduled to see a podiatrist.  Continue ibuprofen 600 mg twice a day    3. De Quervain's tenosynovitis-worse symptoms on the right side. Kenalog injection lasted only for 4 months. I will refer to hand specialist for further management. Continue ibuprofen 600 mg 2-3 times a day as needed. The patient indicates understanding of these issues and agrees with the plan. Return in about 6 months (around 6/20/2023). The risks and benefits of my recommendations, as well as other treatment options, benefits and side effects werediscussed with the patient. All questions were answered. ######################################################################    I thank you for giving me theopportunity to participate in [facility]. If you have any questions or concerns please feel free to contact me.  I look forward to following Kenia along with you. Electronically signed by: Litzy Miller MD, MD, 12/20/2022 3:55 PM    Documentation was done using voice recognition dragon software. Every effort was made to ensure accuracy;however, inadvertent unintentional computerized transcription errors may be present.

## 2023-01-06 ENCOUNTER — PATIENT MESSAGE (OUTPATIENT)
Dept: RHEUMATOLOGY | Age: 38
End: 2023-01-06

## 2023-01-06 DIAGNOSIS — M79.672 PAIN IN BOTH FEET: Primary | ICD-10-CM

## 2023-01-06 DIAGNOSIS — M79.671 PAIN IN BOTH FEET: Primary | ICD-10-CM

## 2023-01-09 NOTE — TELEPHONE ENCOUNTER
From: Beck Valenzuela  To: Dr. Ryland Mcdaniel: 1/6/2023 4:04 PM EST  Subject: Follow from referral to podiatrist     Zofia Correa,   I just wanted to let you know I did visit a podiatrist about the foot pain a few days ago. The doctor did not see or feel anything that would give me the pain and discomfort I described to them. I discussed my fibromyalgia issues and how I feel pain radiating from my feet all the way up my leg, hip, back, and neck when I stand and walk. They suggested I should discuss an EMG Test with you to see if there's a nerve disfunction that's shooting pain as I described. I am interested in this, what would I have to do to move forward with you on such a test? I will try to upload the notes the doctor left on my summary for you to review.  Thank you

## 2023-01-22 DIAGNOSIS — R60.9 EDEMA, UNSPECIFIED TYPE: ICD-10-CM

## 2023-01-23 RX ORDER — HYDROCHLOROTHIAZIDE 12.5 MG/1
12.5 CAPSULE, GELATIN COATED ORAL DAILY
Qty: 30 CAPSULE | Refills: 1 | Status: SHIPPED | OUTPATIENT
Start: 2023-01-23 | End: 2023-03-23

## 2023-03-23 DIAGNOSIS — R60.9 EDEMA, UNSPECIFIED TYPE: ICD-10-CM

## 2023-03-23 RX ORDER — HYDROCHLOROTHIAZIDE 12.5 MG/1
12.5 CAPSULE, GELATIN COATED ORAL DAILY
Qty: 15 CAPSULE | Refills: 0 | Status: SHIPPED | OUTPATIENT
Start: 2023-03-23

## 2023-04-19 ENCOUNTER — TELEMEDICINE (OUTPATIENT)
Dept: FAMILY MEDICINE CLINIC | Age: 38
End: 2023-04-19
Payer: MEDICAID

## 2023-04-19 DIAGNOSIS — F41.9 ANXIETY: ICD-10-CM

## 2023-04-19 DIAGNOSIS — I10 BENIGN ESSENTIAL HTN: Primary | ICD-10-CM

## 2023-04-19 DIAGNOSIS — M79.7 FIBROMYALGIA: ICD-10-CM

## 2023-04-19 DIAGNOSIS — Z13.220 SCREENING, LIPID: ICD-10-CM

## 2023-04-19 PROCEDURE — G8427 DOCREV CUR MEDS BY ELIG CLIN: HCPCS | Performed by: FAMILY MEDICINE

## 2023-04-19 PROCEDURE — 99214 OFFICE O/P EST MOD 30 MIN: CPT | Performed by: FAMILY MEDICINE

## 2023-04-19 SDOH — ECONOMIC STABILITY: FOOD INSECURITY: WITHIN THE PAST 12 MONTHS, YOU WORRIED THAT YOUR FOOD WOULD RUN OUT BEFORE YOU GOT MONEY TO BUY MORE.: PATIENT DECLINED

## 2023-04-19 SDOH — ECONOMIC STABILITY: FOOD INSECURITY: WITHIN THE PAST 12 MONTHS, THE FOOD YOU BOUGHT JUST DIDN'T LAST AND YOU DIDN'T HAVE MONEY TO GET MORE.: PATIENT DECLINED

## 2023-04-19 SDOH — ECONOMIC STABILITY: TRANSPORTATION INSECURITY
IN THE PAST 12 MONTHS, HAS LACK OF TRANSPORTATION KEPT YOU FROM MEETINGS, WORK, OR FROM GETTING THINGS NEEDED FOR DAILY LIVING?: PATIENT DECLINED

## 2023-04-19 SDOH — ECONOMIC STABILITY: INCOME INSECURITY: HOW HARD IS IT FOR YOU TO PAY FOR THE VERY BASICS LIKE FOOD, HOUSING, MEDICAL CARE, AND HEATING?: PATIENT DECLINED

## 2023-04-19 SDOH — ECONOMIC STABILITY: HOUSING INSECURITY
IN THE LAST 12 MONTHS, WAS THERE A TIME WHEN YOU DID NOT HAVE A STEADY PLACE TO SLEEP OR SLEPT IN A SHELTER (INCLUDING NOW)?: PATIENT REFUSED

## 2023-04-19 NOTE — PROGRESS NOTES
Subjective:      Patient ID: Darshana Ghotra is a 42 y.o. female. Darshana Ghotra, was evaluated through a synchronous (real-time) audio-video encounter. The patient (or guardian if applicable) is aware that this is a billable service, which includes applicable co-pays. This Virtual Visit was conducted with patient's (and/or legal guardian's) consent. The visit was conducted pursuant to the emergency declaration under the [facility], [address] [place] [address] and the [employer] Act. Patient identification was verified, and a caregiver was present when appropriate. The patient was located at [facility] [address]  Provider was located at [facility] ([place] [address]): [facility]ela [place], [address], [address]         Total time spent for this encounter: Not billed by time    --Terrance Toscano MD on 4/19/2023 at 12:24 PM    An electronic signature was used to authenticate this note. Hypertension  This is a chronic problem. The current episode started more than 1 year ago. The problem has been waxing and waning since onset. The problem is controlled. Associated symptoms include anxiety and malaise/fatigue. Pertinent negatives include no blurred vision, chest pain, headaches, neck pain, orthopnea, palpitations, peripheral edema, PND, shortness of breath or sweats. There are no associated agents to hypertension. Past treatments include diuretics. The current treatment provides moderate improvement. Compliance problems include exercise and diet. Other  This is a chronic (fibromyalgia) problem. The current episode started more than 1 year ago. The problem occurs constantly. The problem has been waxing and waning. Associated symptoms include fatigue and myalgias. Pertinent negatives include no chest pain, headaches, neck pain or weakness. Nothing aggravates the symptoms.  Treatments tried:

## 2023-04-20 ASSESSMENT — ENCOUNTER SYMPTOMS
SHORTNESS OF BREATH: 0
ORTHOPNEA: 0
BLURRED VISION: 0

## 2023-04-24 ENCOUNTER — TELEPHONE (OUTPATIENT)
Dept: FAMILY MEDICINE CLINIC | Age: 38
End: 2023-04-24

## 2023-04-24 NOTE — TELEPHONE ENCOUNTER
----- Message from Yong Weston sent at 4/19/2023  1:03 PM EDT -----  Subject: Referral Request    Reason for referral request? counseling  Provider patient wants to be referred to(if known):     Provider Phone Number(if known): Additional Information for Provider? pt needs a referral for a counselor.    ---------------------------------------------------------------------------  --------------  Fred Iniguez [facility]    [phone]; OK to leave message on voicemail  ---------------------------------------------------------------------------  --------------

## 2023-05-18 ENCOUNTER — OFFICE VISIT (OUTPATIENT)
Dept: FAMILY MEDICINE CLINIC | Age: 38
End: 2023-05-18
Payer: MEDICAID

## 2023-05-18 VITALS
SYSTOLIC BLOOD PRESSURE: 122 MMHG | HEART RATE: 102 BPM | TEMPERATURE: 97.8 F | BODY MASS INDEX: 28.57 KG/M2 | WEIGHT: 146.3 LBS | OXYGEN SATURATION: 95 % | DIASTOLIC BLOOD PRESSURE: 82 MMHG

## 2023-05-18 DIAGNOSIS — M54.6 CHRONIC BILATERAL THORACIC BACK PAIN: ICD-10-CM

## 2023-05-18 DIAGNOSIS — Z00.00 ENCOUNTER FOR WELL ADULT EXAM WITHOUT ABNORMAL FINDINGS: Primary | ICD-10-CM

## 2023-05-18 DIAGNOSIS — G89.29 CHRONIC BILATERAL THORACIC BACK PAIN: ICD-10-CM

## 2023-05-18 DIAGNOSIS — I10 BENIGN ESSENTIAL HTN: ICD-10-CM

## 2023-05-18 PROCEDURE — 99395 PREV VISIT EST AGE 18-39: CPT | Performed by: FAMILY MEDICINE

## 2023-05-18 PROCEDURE — 3079F DIAST BP 80-89 MM HG: CPT | Performed by: FAMILY MEDICINE

## 2023-05-18 PROCEDURE — 3074F SYST BP LT 130 MM HG: CPT | Performed by: FAMILY MEDICINE

## 2023-05-18 ASSESSMENT — ENCOUNTER SYMPTOMS
VOMITING: 0
TROUBLE SWALLOWING: 0
EYE ITCHING: 0
EYE REDNESS: 0
ABDOMINAL PAIN: 0
NAUSEA: 0
SHORTNESS OF BREATH: 0
CHEST TIGHTNESS: 0
RHINORRHEA: 0
WHEEZING: 0

## 2023-05-18 NOTE — PROGRESS NOTES
Well Adult Note  Name: Linnette Ferreira Date: 2023   MRN: [id number] Sex: Female   Age: 42 y.o. Ethnicity:  /    : 1985 Race: Other      Ramos Collier is here for well adult exam.  History:  Well Adult Physical   Patient here for a comprehensive physical exam.The patient reports problems -   Htn: takingc hctz, would likt to hold   Chronic thoracic back pain,   Onset months ago   Progression unchanged   Quality aching   Radiation none  Severity mod  Timing daytime   Worse w nothing  Better w nothing    Fibromyalgia  Followed by rheumatology    Do you take any herbs or supplements that were not prescribed by a doctor? no Are you taking calcium supplements? not applicable Are you taking aspirin daily? not applicable   History:  Gyn exam: utd         Review of Systems   Constitutional:  Negative for activity change, appetite change, fatigue, fever and unexpected weight change. HENT:  Negative for congestion, postnasal drip, rhinorrhea and trouble swallowing. Eyes:  Negative for redness and itching. Respiratory:  Negative for chest tightness, shortness of breath and wheezing. Cardiovascular:  Negative for chest pain and palpitations. Gastrointestinal:  Negative for abdominal pain, nausea and vomiting. Endocrine: Negative for polydipsia, polyphagia and polyuria. Genitourinary:  Negative for dysuria and urgency. Musculoskeletal:  Positive for myalgias. Negative for arthralgias, joint swelling and neck pain. Skin:  Negative for rash. Neurological:  Negative for light-headedness and headaches. Hematological:  Negative for adenopathy. Psychiatric/Behavioral:  The patient is nervous/anxious. No Known Allergies      Prior to Visit Medications    Medication Sig Taking?  Authorizing Provider   gabapentin (NEURONTIN) 300 MG capsule Currently on gabapentin 300 mg 3 times a day, taper off gabapentin by 1 capsule every 3 days until you reach 1 capsule at night Yes

## 2023-06-01 ENCOUNTER — TELEPHONE (OUTPATIENT)
Dept: FAMILY MEDICINE CLINIC | Age: 38
End: 2023-06-01

## 2023-06-01 NOTE — TELEPHONE ENCOUNTER
Patient was told to schedule a follow up for BP. Patient is currently scheduled for July 6th. But patient is requesting a sooner appointment if possible.      Last Office Visit: 05/18/2023

## 2023-06-08 ENCOUNTER — PATIENT MESSAGE (OUTPATIENT)
Dept: FAMILY MEDICINE CLINIC | Age: 38
End: 2023-06-08

## 2023-06-08 DIAGNOSIS — I10 BENIGN ESSENTIAL HTN: Primary | ICD-10-CM

## 2023-06-08 DIAGNOSIS — Z13.220 SCREENING, LIPID: ICD-10-CM

## 2023-06-08 ASSESSMENT — PATIENT HEALTH QUESTIONNAIRE - PHQ9
2. FEELING DOWN, DEPRESSED OR HOPELESS: SEVERAL DAYS
SUM OF ALL RESPONSES TO PHQ QUESTIONS 1-9: 2
2. FEELING DOWN, DEPRESSED OR HOPELESS: 1
SUM OF ALL RESPONSES TO PHQ QUESTIONS 1-9: 2
SUM OF ALL RESPONSES TO PHQ QUESTIONS 1-9: 2
1. LITTLE INTEREST OR PLEASURE IN DOING THINGS: SEVERAL DAYS
SUM OF ALL RESPONSES TO PHQ9 QUESTIONS 1 & 2: 2
1. LITTLE INTEREST OR PLEASURE IN DOING THINGS: 1
SUM OF ALL RESPONSES TO PHQ9 QUESTIONS 1 & 2: 2
SUM OF ALL RESPONSES TO PHQ QUESTIONS 1-9: 2

## 2023-06-08 NOTE — TELEPHONE ENCOUNTER
From: Katherine Fischer  To: Dr. Strong Henry: 6/8/2023 2:31 PM EDT  Subject: Blood work canceled     Hi there [address] [place] [address] and Staff,    I will be seeing you soon to check blood pressure and did not want to forget to mention this, after visiting the lab @ [address] [place] [address] today, I was told my order to check cholesterol was cancelled. Is this right?     Thank you  CD

## 2023-06-09 ENCOUNTER — OFFICE VISIT (OUTPATIENT)
Dept: FAMILY MEDICINE CLINIC | Age: 38
End: 2023-06-09
Payer: MEDICAID

## 2023-06-09 VITALS
OXYGEN SATURATION: 98 % | BODY MASS INDEX: 28.39 KG/M2 | HEART RATE: 95 BPM | DIASTOLIC BLOOD PRESSURE: 76 MMHG | HEIGHT: 60 IN | SYSTOLIC BLOOD PRESSURE: 124 MMHG | TEMPERATURE: 97.9 F | WEIGHT: 144.6 LBS

## 2023-06-09 DIAGNOSIS — I10 BENIGN ESSENTIAL HTN: Primary | ICD-10-CM

## 2023-06-09 DIAGNOSIS — E55.9 VITAMIN D DEFICIENCY: ICD-10-CM

## 2023-06-09 PROCEDURE — 99213 OFFICE O/P EST LOW 20 MIN: CPT | Performed by: FAMILY MEDICINE

## 2023-06-09 PROCEDURE — 1036F TOBACCO NON-USER: CPT | Performed by: FAMILY MEDICINE

## 2023-06-09 PROCEDURE — 3078F DIAST BP <80 MM HG: CPT | Performed by: FAMILY MEDICINE

## 2023-06-09 PROCEDURE — 3074F SYST BP LT 130 MM HG: CPT | Performed by: FAMILY MEDICINE

## 2023-06-09 PROCEDURE — G8427 DOCREV CUR MEDS BY ELIG CLIN: HCPCS | Performed by: FAMILY MEDICINE

## 2023-06-09 PROCEDURE — G8419 CALC BMI OUT NRM PARAM NOF/U: HCPCS | Performed by: FAMILY MEDICINE

## 2023-06-09 RX ORDER — CHOLECALCIFEROL (VITAMIN D3) 125 MCG
CAPSULE ORAL
Qty: 30 TABLET | Refills: 5 | Status: SHIPPED | OUTPATIENT
Start: 2023-06-09

## 2023-06-09 ASSESSMENT — ENCOUNTER SYMPTOMS
SHORTNESS OF BREATH: 0
ORTHOPNEA: 0
BLURRED VISION: 0

## 2023-06-09 NOTE — PROGRESS NOTES
tenderness. Musculoskeletal:      Cervical back: Normal range of motion. Neurological:      General: No focal deficit present. Mental Status: She is alert and oriented to person, place, and time. Mental status is at baseline.    Psychiatric:         Mood and Affect: Mood normal.         Behavior: Behavior normal.       Assessment:      HTN       Plan:      Diet / exercise/relaxation techniques , controlled  Continue to hold hctz  Check bp at home if possible q week  Call if > 140/90    Fu 6 mo          Staci Gong MD

## 2023-06-10 LAB
AMBIGUOUS ABBREVIATION: NORMAL
CHOLESTEROL, TOTAL: 144 MG/DL (ref 100–199)
COMMENT: NORMAL
HDLC SERPL-MCNC: 59 MG/DL
LDL CHOLESTEROL CALCULATED: 73 MG/DL (ref 0–99)
TRIGL SERPL-MCNC: 57 MG/DL (ref 0–149)
VLDLC SERPL CALC-MCNC: 12 MG/DL (ref 5–40)

## 2023-06-21 ENCOUNTER — OFFICE VISIT (OUTPATIENT)
Dept: ORTHOPEDIC SURGERY | Age: 38
End: 2023-06-21
Payer: MEDICAID

## 2023-06-21 VITALS — WEIGHT: 144 LBS | BODY MASS INDEX: 28.27 KG/M2 | HEIGHT: 60 IN

## 2023-06-21 DIAGNOSIS — M54.6 CHRONIC THORACIC BACK PAIN, UNSPECIFIED BACK PAIN LATERALITY: Primary | ICD-10-CM

## 2023-06-21 DIAGNOSIS — M41.9 SCOLIOSIS OF THORACIC SPINE, UNSPECIFIED SCOLIOSIS TYPE: ICD-10-CM

## 2023-06-21 DIAGNOSIS — G89.29 CHRONIC THORACIC BACK PAIN, UNSPECIFIED BACK PAIN LATERALITY: Primary | ICD-10-CM

## 2023-06-21 PROCEDURE — G8419 CALC BMI OUT NRM PARAM NOF/U: HCPCS | Performed by: PHYSICIAN ASSISTANT

## 2023-06-21 PROCEDURE — 1036F TOBACCO NON-USER: CPT | Performed by: PHYSICIAN ASSISTANT

## 2023-06-21 PROCEDURE — 99204 OFFICE O/P NEW MOD 45 MIN: CPT | Performed by: PHYSICIAN ASSISTANT

## 2023-06-21 PROCEDURE — G8428 CUR MEDS NOT DOCUMENT: HCPCS | Performed by: PHYSICIAN ASSISTANT

## 2023-06-21 RX ORDER — MELOXICAM 15 MG/1
15 TABLET ORAL DAILY PRN
Qty: 30 TABLET | Refills: 0 | Status: SHIPPED | OUTPATIENT
Start: 2023-06-21 | End: 2023-07-21

## 2023-06-21 NOTE — PROGRESS NOTES
New Patient: SPINE    6/21/2023     CHIEF COMPLAINT:    Chief Complaint   Patient presents with    Back Pain     Thoracic spine       HISTORY OF PRESENT ILLNESS:              The patient is a 42 y.o. female history of depression, fibromyalgia referred by Leatha Crespo MD for chronic worsening midline thoracic back pain. She reports aching and stabbing midline to right constant thoracic pain. Her symptoms have been present since childhood. She feels symptoms have been worsening over the last few years. Yes her pain is constant but increased with sitting, standing, walking or resting. Denies any true palliative factors. Conservative care includes PT for lumbar spine, gabapentin, Elavil, ibuprofen. Very intermittently she will experience some tingling in her hands and feet. She reports subjective arm weakness. She denies any clear-cut upper or lower extremity radiating pain. She denies any progressive extremity weakness. She denies any fine motor difficulty. She denies any recent fevers chills or infections. No recent injury or trauma. The pain assessment was noted & reviewed in the medical record today. Current/Past Treatment:   Physical Therapy: Yes, L-spine  Chiropractic:     Injection:     Medications:            NSAIDS: Ibuprofen            Muscle relaxer:              Steriods:              Neuropathic medications: Gabapentin            Opioids: Elavil            Other:   Surgery/Consult: no    Work Status/Functionality: N/A    Past Medical History: Medical history form was reviewed today & scanned into the media tab  Past Medical History:   Diagnosis Date    Depression     Fibromyalgia     Preeclampsia       Past Surgical History:     No past surgical history on file.   Current Medications:     Current Outpatient Medications:     Cholecalciferol (VITAMIN D3) 50 MCG (2000 UT) TABS, 1 Tablet by mouth daily, Disp: 30 tablet, Rfl: 5    gabapentin (NEURONTIN) 300 MG capsule, Currently on

## 2023-07-03 ENCOUNTER — HOSPITAL ENCOUNTER (OUTPATIENT)
Dept: PHYSICAL THERAPY | Age: 38
Setting detail: THERAPIES SERIES
Discharge: HOME | End: 2023-07-03
Payer: MEDICAID

## 2023-07-03 PROCEDURE — 97110 THERAPEUTIC EXERCISES: CPT

## 2023-07-03 PROCEDURE — 97161 PT EVAL LOW COMPLEX 20 MIN: CPT

## 2023-07-03 NOTE — FLOWSHEET NOTE
The [facility]      Physical Therapy Treatment Note/ Progress Report:     Date:  2023    Patient Name:  Titus Leavitt    :  1985  MRN: [id number]  Restrictions/Precautions:    Medical/Treatment Diagnosis Information:  Diagnosis: M54.6 chronic thoracic pain, M41.9 scoliosis of thoracic spine  Treatment Diagnosis: M54.2 neck pain, M54.6 thoracic pain  Insurance/Certification information:   [facility]  Physician Information:   YVAN Garcia  Has the plan of care been signed (Y/N):        []  Yes  [x]  No     Date of Patient follow up with Physician: not scheduled    Is this a Progress Report:     []  Yes  [x]  No     If Yes:  Date Range for reporting period:  Beginning:  ------------ Ending:     Progress report will be due (10 Rx or 30 days whichever is less):      Recertification will be due (POC Duration  / 90 days whichever is less): 10/5/23      Visit # Insurance Allowable Auth Required   In Person 1 BMN []  Yes     []  No    Tele Health   []  Yes     []  No    Total 1       Functional Scale: NDI    Date assessed:  NV      Latex Allergy:  [x]NO      []YES  Preferred Language for Healthcare:   [x]English       []other:    Pain level:  3-9/10     SUBJECTIVE:  See eval    OBJECTIVE: See eval  Observation:   Test measurements:      RESTRICTIONS/PRECAUTIONS: none    Exercises/Interventions:   Therapeutic Ex (33548) Sets/sec Reps Notes/CUES HEP                                                                                       Manual Intervention ([id number])                                                 NMR re-education (06597)   CUES NEEDED                                                                   Therapeutic Activity (51391)                                          [place] Therapist access code: [email or web address]           Therapeutic Exercise and NMR EXR  [] (92219) Provided verbal/tactile cueing for activities related to strengthening,

## 2023-07-10 ENCOUNTER — HOSPITAL ENCOUNTER (OUTPATIENT)
Dept: PHYSICAL THERAPY | Age: 38
Setting detail: THERAPIES SERIES
Discharge: HOME | End: 2023-07-10
Payer: MEDICAID

## 2023-07-10 PROCEDURE — 97140 MANUAL THERAPY 1/> REGIONS: CPT

## 2023-07-10 PROCEDURE — 97110 THERAPEUTIC EXERCISES: CPT

## 2023-07-10 NOTE — FLOWSHEET NOTE
The [facility]      Physical Therapy Treatment Note/ Progress Report:     Date:  7/10/2023    Patient Name:  Paulina Ghosh    :  1985  MRN: [id number]  Restrictions/Precautions:    Medical/Treatment Diagnosis Information:  Diagnosis: M54.6 chronic thoracic pain, M41.9 scoliosis of thoracic spine  Treatment Diagnosis: M54.2 neck pain, M54.6 thoracic pain  Insurance/Certification information:   [facility]  Physician Information:   YVAN Coats  Has the plan of care been signed (Y/N):        []  Yes  [x]  No     Date of Patient follow up with Physician: not scheduled    Is this a Progress Report:     []  Yes  [x]  No     If Yes:  Date Range for reporting period:  Beginning:  ------------ Ending:     Progress report will be due (10 Rx or 30 days whichever is less): 26     Recertification will be due (POC Duration  / 90 days whichever is less): 10/5/23      Visit # Insurance Allowable Auth Required   In Person 2 BMN []  Yes     []  No    Tele Health   []  Yes     []  No    Total 2       Functional Scale: NDI    Date assessed:  NV      Latex Allergy:  [x]NO      []YES  Preferred Language for Healthcare:   [x]English       []other:    Pain level:  5/10     SUBJECTIVE: Pt has been feel \"ok. \" She has been able to do the ex every 2-3 days.     OBJECTIVE: See eval  Observation:   Test measurements:      RESTRICTIONS/PRECAUTIONS: none    Exercises/Interventions:   Therapeutic Ex (11076) Sets/sec Reps Notes/CUES HEP   Scap squeeze 1 10  x   Sidelying open book 2 5 Added 7/10 x   LS S 10\" 5  x   SA punch 2 10  x   Chin tuck 1 10  x   Bilat ER 2 10 RTB, added 7/10 x                                      Pt edu: HEP, dx, POC, ice       Manual Intervention (42056)       STM to R cervical paraspinals, thoracic paraspinals, LS x8'  Significant tenderness along thoracic paraspinals, T6    SOR x3'                                  NMR re-education (44333)

## 2023-07-12 ENCOUNTER — HOSPITAL ENCOUNTER (OUTPATIENT)
Dept: PHYSICAL THERAPY | Age: 38
Setting detail: THERAPIES SERIES
Discharge: HOME | End: 2023-07-12
Payer: MEDICAID

## 2023-07-12 PROCEDURE — G0283 ELEC STIM OTHER THAN WOUND: HCPCS

## 2023-07-12 PROCEDURE — 97110 THERAPEUTIC EXERCISES: CPT

## 2023-07-12 PROCEDURE — 97140 MANUAL THERAPY 1/> REGIONS: CPT

## 2023-07-12 NOTE — FLOWSHEET NOTE
The [facility]      Physical Therapy Treatment Note/ Progress Report:     Date:  2023    Patient Name:  Aster Capone    :  1985  MRN: [id number]  Restrictions/Precautions:    Medical/Treatment Diagnosis Information:  Diagnosis: M54.6 chronic thoracic pain, M41.9 scoliosis of thoracic spine  Treatment Diagnosis: M54.2 neck pain, M54.6 thoracic pain  Insurance/Certification information:   [facility]  Physician Information:   YVAN Stout  Has the plan of care been signed (Y/N):        []  Yes  [x]  No     Date of Patient follow up with Physician: not scheduled    Is this a Progress Report:     []  Yes  [x]  No     If Yes:  Date Range for reporting period:  Beginning:  ------------ Ending:     Progress report will be due (10 Rx or 30 days whichever is less): 82     Recertification will be due (POC Duration  / 90 days whichever is less): 10/5/23      Visit # Insurance Allowable Auth Required   In Person 3 BMN []  Yes     []  No    Tele Health   []  Yes     []  No    Total 3       Functional Scale: NDI    Date assessed:  NV      Latex Allergy:  [x]NO      []YES  Preferred Language for Healthcare:   [x]English       []other:    Pain level:  4/10     SUBJECTIVE: Pt reports she felt a little better after last visit and is more sore, has not experienced any of the sharp stabbing pain.      OBJECTIVE: See eval  Observation:   Test measurements:      RESTRICTIONS/PRECAUTIONS: none    Exercises/Interventions:   Therapeutic Ex (51158) Sets/sec Reps Notes/CUES HEP   Scap squeeze 1 10  x   Sidelying open book 2 5 Added 7/10 x   LS S 10\" 5  x   SA punch 3 10  x   Chin tuck 1 10  x   Bilat ER 2 10 RTB, added 7/10 x   Pec S NV                                  Pt edu: HEP, dx, POC, ice       Manual Intervention (85000)       STM to R cervical paraspinals, thoracic paraspinals, LS x6'  Significant tenderness along thoracic paraspinals, T6    SOR x3'

## 2023-07-13 ENCOUNTER — OFFICE VISIT (OUTPATIENT)
Age: 38
End: 2023-07-13

## 2023-07-13 DIAGNOSIS — F41.9 ANXIETY: Primary | ICD-10-CM

## 2023-07-13 ASSESSMENT — PROMIS GLOBAL HEALTH SCALE
IN THE PAST 7 DAYS, HOW WOULD YOU RATE YOUR FATIGUE ON AVERAGE [ON A SCALE FROM 1 (NONE) TO 5 (VERY SEVERE)]?: 3
IN GENERAL, HOW WOULD YOU RATE YOUR PHYSICAL HEALTH [ON A SCALE OF 1 (POOR) TO 5 (EXCELLENT)]?: 2
TO WHAT EXTENT ARE YOU ABLE TO CARRY OUT YOUR EVERYDAY PHYSICAL ACTIVITIES SUCH AS WALKING, CLIMBING STAIRS, CARRYING GROCERIES, OR MOVING A CHAIR [ON A SCALE OF 1 (NOT AT ALL) TO 5 (COMPLETELY)]?: 3
IN GENERAL, WOULD YOU SAY YOUR HEALTH IS...[ON A SCALE OF 1 (POOR) TO 5 (EXCELLENT)]: 3
IN GENERAL, WOULD YOU SAY YOUR QUALITY OF LIFE IS...[ON A SCALE OF 1 (POOR) TO 5 (EXCELLENT)]: 3
IN GENERAL, PLEASE RATE HOW WELL YOU CARRY OUT YOUR USUAL SOCIAL ACTIVITIES (INCLUDES ACTIVITIES AT HOME, AT WORK, AND IN YOUR COMMUNITY, AND RESPONSIBILITIES AS A PARENT, CHILD, SPOUSE, EMPLOYEE, FRIEND, ETC) [ON A SCALE OF 1 (POOR) TO 5 (EXCELLENT)]?: 2
IN THE PAST 7 DAYS, HOW WOULD YOU RATE YOUR PAIN ON AVERAGE [ON A SCALE FROM 0 (NO PAIN) TO 10 (WORST IMAGINABLE PAIN)]?: 3
SUM OF RESPONSES TO QUESTIONS 3, 6, 7, & 8: 11
IN GENERAL, HOW WOULD YOU RATE YOUR MENTAL HEALTH, INCLUDING YOUR MOOD AND YOUR ABILITY TO THINK [ON A SCALE OF 1 (POOR) TO 5 (EXCELLENT)]?: 3
IN GENERAL, HOW WOULD YOU RATE YOUR SATISFACTION WITH YOUR SOCIAL ACTIVITIES AND RELATIONSHIPS [ON A SCALE OF 1 (POOR) TO 5 (EXCELLENT)]?: 2
SUM OF RESPONSES TO QUESTIONS 2, 4, 5, & 10: 10
IN THE PAST 7 DAYS, HOW OFTEN HAVE YOU BEEN BOTHERED BY EMOTIONAL PROBLEMS, SUCH AS FEELING ANXIOUS, DEPRESSED, OR IRRITABLE [ON A SCALE FROM 1 (NEVER) TO 5 (ALWAYS)]?: 2

## 2023-07-13 NOTE — PROGRESS NOTES
Behavioral Health Consultation   Shorty Hatfield, PhD  Clinical Psychologist  7/13/2023      Time spent with Patient: 27 minutes 8:07-8:34  This is patient's 4th  [facility] appointment. Reason for Consult:   Chief Complaint   Patient presents with    Anxiety       Referring Provider: Adela Mane MD    Subjective  PT Improvement Questions  How is (target problem) going for you? Same, better, or worse? better    Past hx - pregnancy - complications with 3rd trimester - preeclampsia, extreme pain from the waist down, fibro out of control - couldn't even touch legs without pain- was working part time. the experience was out of control. Wondering about getting pregnant again. Worried about socializing as well. What specifically has changed (if anything)? Working very hard to manage stressors and pain with a range of strategies      Has anyone else noticed any change(s)? If so, what? N/a    What do you think is causing the change or keeping it from getting worse? Using self talk , self-compassion, visual imagery and body scan , audio - soft rain, ocean. Plan Implementation Questions    Recommendations to Patient:   1. Self-compassion. org or exercises,  compassion breath for difficult moments  2. Continue with breathing, coping strategies     What part were you able to do? All of it  Did it help/what were the results? Yes      FUNCTIONAL ANALYSIS (if applicable)     Risk Assessment: Patient is judged to be at low risk for harm to self/others due to no current thoughts/reports of suicide and homicide.      Objective  Level of distress: Low  Orientation: Person, Place, Time, and Situation  Appearance: Well-groomed, Awake, Alert, and Good eye contact  Other: mood is euthymic        7/13/23 PROMIS-10 Scores: Physical: 11 Mental: 10    PROMIS Raw Score   WNL Symptoms /Impairment     Mild Moderate Severe   Global Physical Health 15-20 13-14 9-12 4-8   Global Mental Health 14-20 11-13 7-10 4-6       Assessment

## 2023-07-17 ENCOUNTER — HOSPITAL ENCOUNTER (OUTPATIENT)
Dept: PHYSICAL THERAPY | Age: 38
Setting detail: THERAPIES SERIES
Discharge: HOME | End: 2023-07-17
Payer: MEDICAID

## 2023-07-17 PROCEDURE — 97110 THERAPEUTIC EXERCISES: CPT

## 2023-07-17 PROCEDURE — G0283 ELEC STIM OTHER THAN WOUND: HCPCS

## 2023-07-17 PROCEDURE — 97140 MANUAL THERAPY 1/> REGIONS: CPT

## 2023-07-17 NOTE — FLOWSHEET NOTE
Progression is slowed due to complexities/Impairments listed. [] Progression has been slowed due to co-morbidities. [x] Plan just implemented, too soon to assess goals progression <30days   [] Goals require adjustment due to lack of progress  [] Patient is not progressing as expected and requires additional follow up with physician  [] Other    Prognosis for POC: [x] Good [] Fair  [] Poor      Patient requires continued skilled intervention: [x] Yes  [] No    Treatment/Activity Tolerance:  [x] Patient able to complete treatment  [] Patient limited by fatigue  [] Patient limited by pain    [] Patient limited by other medical complications  [] Other:       PLAN: See eval  [x] Continue per plan of care [] Alter current plan (see comments above)  [] Plan of care initiated [] Hold pending MD visit [] Discharge    Electronically signed by:  Ping Nelson PT    Note: If patient does not return for scheduled/ recommended follow up visits, this note will serve as a discharge from care along with most recent update on progress.

## 2023-07-19 ENCOUNTER — HOSPITAL ENCOUNTER (OUTPATIENT)
Dept: PHYSICAL THERAPY | Age: 38
Setting detail: THERAPIES SERIES
Discharge: HOME | End: 2023-07-19
Payer: MEDICAID

## 2023-07-19 PROCEDURE — G0283 ELEC STIM OTHER THAN WOUND: HCPCS

## 2023-07-19 PROCEDURE — 97140 MANUAL THERAPY 1/> REGIONS: CPT

## 2023-07-19 PROCEDURE — 97110 THERAPEUTIC EXERCISES: CPT

## 2023-07-19 NOTE — FLOWSHEET NOTE
soon to assess goals progression <30days   [] Goals require adjustment due to lack of progress  [] Patient is not progressing as expected and requires additional follow up with physician  [] Other    Prognosis for POC: [x] Good [] Fair  [] Poor      Patient requires continued skilled intervention: [x] Yes  [] No    Treatment/Activity Tolerance:  [x] Patient able to complete treatment  [] Patient limited by fatigue  [] Patient limited by pain    [] Patient limited by other medical complications  [] Other:       PLAN: See eval  [x] Continue per plan of care [] Alter current plan (see comments above)  [] Plan of care initiated [] Hold pending MD visit [] Discharge    Electronically signed by:  Julio Stephen PT    Note: If patient does not return for scheduled/ recommended follow up visits, this note will serve as a discharge from care along with most recent update on progress.

## 2023-07-24 ENCOUNTER — HOSPITAL ENCOUNTER (OUTPATIENT)
Dept: PHYSICAL THERAPY | Age: 38
Setting detail: THERAPIES SERIES
Discharge: HOME | End: 2023-07-24
Payer: MEDICAID

## 2023-07-24 PROCEDURE — 97110 THERAPEUTIC EXERCISES: CPT

## 2023-07-24 PROCEDURE — 97140 MANUAL THERAPY 1/> REGIONS: CPT

## 2023-07-24 NOTE — FLOWSHEET NOTE
tissue/joints of cervical/CT, scapular GHJ and UE for the purpose of modulating pain, promoting relaxation,  increasing ROM, reducing/eliminating soft tissue swelling/inflammation/restriction, improving soft tissue extensibility and allowing for proper ROM for normal function with self care, reaching, carrying, lifting, house/yardwork, driving/computer work    Modalities:  Electrical stimulation for pain control. Waveform: Premod; Cycle Time: Continuous; Frequency: 80/150 Hz; Amplitude: 6/4 Volts; Treatment time: 10 min with MHP. [] GAME READY (VASO)- for significant edema, swelling, pain control. Charges:  Timed Code Treatment Minutes: 40   Total Treatment Minutes:  50      [] EVAL (LOW) 17529 (typically 20 minutes face-to-face)  [] EVAL (MOD) 02824 (typically 30 minutes face-to-face)  [] EVAL (HIGH) 34921 (typically 45 minutes face-to-face)  [] RE-EVAL     [x] IN(08055) x   2  [] IONTO  [] NMR (43040) x     [] VASO  [x] Manual (51112) x    1 [] Other:  [] TA x      [] Mech Traction (46441)  [] ES(attended) (45257)      [x] ES (un) (83017):    ASSESSMENT:  Pt tolerated treatment well however progress will be slow. Pt is very sensitive to exercise and fatigues easily due to fibromyalgia. Pt will continue to benefit from ? posture strength and stability as well as ? myofascial tightness throughout R shoulder girdle. Access Code: [email or web address]  URL: [email or web address]. [email or web address]  Date: 07/10/2023  Prepared by: Denice Dhillon    Exercises  - Supine Scapular Protraction in Flexion with Dumbbells  - 1 x daily - 7 x weekly - 1 sets - 10 reps  - Supine Cervical Retraction with Towel  - 1 x daily - 7 x weekly - 1 sets - 10 reps  - Seated Scapular Retraction  - 1 x daily - 7 x weekly - 1 sets - 10 reps  - Gentle Levator Scapulae Stretch  - 1 x daily - 7 x weekly - 1 sets - 5 reps - 10 sec hold    GOALS:     Patient stated goal: ? pain     Therapist goals for Patient:   Short Term Goals: To be achieved in: 2 weeks  1.

## 2023-07-27 ENCOUNTER — OFFICE VISIT (OUTPATIENT)
Age: 38
End: 2023-07-27
Payer: MEDICAID

## 2023-07-27 ENCOUNTER — TELEPHONE (OUTPATIENT)
Dept: FAMILY MEDICINE CLINIC | Age: 38
End: 2023-07-27

## 2023-07-27 DIAGNOSIS — F41.9 ANXIETY: Primary | ICD-10-CM

## 2023-07-27 PROCEDURE — 90832 PSYTX W PT 30 MINUTES: CPT | Performed by: PSYCHOLOGIST

## 2023-07-27 PROCEDURE — 1036F TOBACCO NON-USER: CPT | Performed by: PSYCHOLOGIST

## 2023-07-27 ASSESSMENT — PROMIS GLOBAL HEALTH SCALE
IN GENERAL, PLEASE RATE HOW WELL YOU CARRY OUT YOUR USUAL SOCIAL ACTIVITIES (INCLUDES ACTIVITIES AT HOME, AT WORK, AND IN YOUR COMMUNITY, AND RESPONSIBILITIES AS A PARENT, CHILD, SPOUSE, EMPLOYEE, FRIEND, ETC) [ON A SCALE OF 1 (POOR) TO 5 (EXCELLENT)]?: 2
IN THE PAST 7 DAYS, HOW WOULD YOU RATE YOUR FATIGUE ON AVERAGE [ON A SCALE FROM 1 (NONE) TO 5 (VERY SEVERE)]?: 3
IN GENERAL, HOW WOULD YOU RATE YOUR SATISFACTION WITH YOUR SOCIAL ACTIVITIES AND RELATIONSHIPS [ON A SCALE OF 1 (POOR) TO 5 (EXCELLENT)]?: 2
TO WHAT EXTENT ARE YOU ABLE TO CARRY OUT YOUR EVERYDAY PHYSICAL ACTIVITIES SUCH AS WALKING, CLIMBING STAIRS, CARRYING GROCERIES, OR MOVING A CHAIR [ON A SCALE OF 1 (NOT AT ALL) TO 5 (COMPLETELY)]?: 3
IN THE PAST 7 DAYS, HOW WOULD YOU RATE YOUR PAIN ON AVERAGE [ON A SCALE FROM 0 (NO PAIN) TO 10 (WORST IMAGINABLE PAIN)]?: 3
IN THE PAST 7 DAYS, HOW OFTEN HAVE YOU BEEN BOTHERED BY EMOTIONAL PROBLEMS, SUCH AS FEELING ANXIOUS, DEPRESSED, OR IRRITABLE [ON A SCALE FROM 1 (NEVER) TO 5 (ALWAYS)]?: 3
IN GENERAL, WOULD YOU SAY YOUR QUALITY OF LIFE IS...[ON A SCALE OF 1 (POOR) TO 5 (EXCELLENT)]: 2
IN GENERAL, HOW WOULD YOU RATE YOUR MENTAL HEALTH, INCLUDING YOUR MOOD AND YOUR ABILITY TO THINK [ON A SCALE OF 1 (POOR) TO 5 (EXCELLENT)]?: 3
IN GENERAL, WOULD YOU SAY YOUR HEALTH IS...[ON A SCALE OF 1 (POOR) TO 5 (EXCELLENT)]: 3
SUM OF RESPONSES TO QUESTIONS 2, 4, 5, & 10: 10
SUM OF RESPONSES TO QUESTIONS 3, 6, 7, & 8: 11
IN GENERAL, HOW WOULD YOU RATE YOUR PHYSICAL HEALTH [ON A SCALE OF 1 (POOR) TO 5 (EXCELLENT)]?: 2

## 2023-07-27 NOTE — TELEPHONE ENCOUNTER
Patient was in office asking if Doctor Marcel Monroe is able to prescribed Anxiety medication that patient is currently taking. Patient states she Vistaril ( hydroxyzine Pamiate) 25 MG tablet form. Patient states she is no longer seeing the doctor who used to prescribe this medication.

## 2023-07-27 NOTE — PROGRESS NOTES
Behavioral Health Consultation   Kingsley Ann, PhD  Clinical Psychologist  7/27/2023      Time spent with Patient: 30 minutes 2:08 -2:38  This is patient's 5th  [facility] appointment. Reason for Consult:   Chief Complaint   Patient presents with    Anxiety       Referring Provider: Di rBadshaw MD    Subjective  PT Improvement Questions  How is (target problem) going for you? Same, better, or worse? better    What specifically has changed (if anything)? I'm mentally progressing      Has anyone else noticed any change(s)? If so, what? N/a    What do you think is causing the change or keeping it from getting worse? Finding benefit with recent recommendation of accepting difficult emotions , experimented with cayenne pepper/olive oil to manage pain, using techniques from chronic pain class      Plan Implementation Questions    Recommendations to Patient:   1. Continue with current coping strategies  2. Accepting emotions handout provided, begin to experiment with this strategy        What part were you able to do? All of it  Did it help/what were the results? Yes      FUNCTIONAL ANALYSIS (if applicable)     Risk Assessment: Patient is judged to be at low risk for harm to self/others due to no current thoughts/reports of suicide and homicide. Objective  Level of distress: Low  Orientation: Person, Place, Time, and Situation  Appearance: Well-groomed, Awake, Alert, and Good eye contact  Other: mood is euthymic        7/27/23 PROMIS-10 Scores: Physical: 11 Mental: 10    PROMIS Raw Score   WNL Symptoms /Impairment     Mild Moderate Severe   Global Physical Health 15-20 13-14 9-12 4-8   Global Mental Health 14-20 11-13 7-10 4-6       Assessment and Plan  Anxiety. Pt continues to report improvement in functioning. She is working to tolerate difficult emotions and pain. Chronic pain remains prominent, she is finding great benefit with techniques learned in chronic pain class as well as in these visits.  Pt is

## 2023-07-28 RX ORDER — HYDROXYZINE HYDROCHLORIDE 25 MG/1
25 TABLET, FILM COATED ORAL EVERY 8 HOURS PRN
Qty: 30 TABLET | Refills: 0 | Status: SHIPPED | OUTPATIENT
Start: 2023-07-28 | End: 2023-08-07

## 2023-07-31 ENCOUNTER — HOSPITAL ENCOUNTER (OUTPATIENT)
Dept: PHYSICAL THERAPY | Age: 38
Setting detail: THERAPIES SERIES
Discharge: HOME | End: 2023-07-31
Payer: MEDICAID

## 2023-07-31 PROCEDURE — 97110 THERAPEUTIC EXERCISES: CPT

## 2023-07-31 PROCEDURE — G0283 ELEC STIM OTHER THAN WOUND: HCPCS

## 2023-07-31 PROCEDURE — 97140 MANUAL THERAPY 1/> REGIONS: CPT

## 2023-07-31 NOTE — FLOWSHEET NOTE
[facility]      Physical Therapy Treatment Note/ Progress Report:     Date:  2023    Patient Name:  Allyson Alvarez    :  1985  MRN: [id number]  Restrictions/Precautions:    Medical/Treatment Diagnosis Information:  Diagnosis: M54.6 chronic thoracic pain, M41.9 scoliosis of thoracic spine  Treatment Diagnosis: M54.2 neck pain, M54.6 thoracic pain  Insurance/Certification information:   [facility]  Physician Information:   YVAN Daugherty  Has the plan of care been signed (Y/N):        [x]  Yes  []  No     Date of Patient follow up with Physician: not scheduled    Is this a Progress Report:     []  Yes  [x]  No     If Yes:  Date Range for reporting period:  Beginning:  ------------ Ending:     Progress report will be due (10 Rx or 30 days whichever is less): 40     Recertification will be due (POC Duration  / 90 days whichever is less): 10/5/23      Visit # Insurance Allowable Auth Required   In Person 6 BMN []  Yes     []  No    Tele Health   []  Yes     []  No    Total 6       Functional Scale: NDI    Date assessed:  NV      Latex Allergy:  [x]NO      []YES  Preferred Language for Healthcare:   [x]English       []other:    Pain level:  5/10     SUBJECTIVE: Pt reports she continues to have ups and downs. Pt woke up with a lot of pain (8/10) this morning (in R shoulder blade region) but was able to calm it down after approx 5 hours by doing PT, and breathing exercises.     OBJECTIVE: See eval  Observation:   Test measurements:      RESTRICTIONS/PRECAUTIONS: none    Exercises/Interventions:   Therapeutic Ex (52262) Sets/sec Reps Notes/CUES HEP   Scap squeeze 7-8\" 10  x   Sidelying open book 2 5 Added 7/10 x   LS S 10\" 5  x   SA punch 3 10  x   Chin tuck 1 10 VC x   Bilat ER 2 10 RTB, added 7/10 x   Pec S 5\" 5 Added , on post x   Supine horiz ABD 2 10 RTB, added  x                              Manual Intervention (71642)

## 2023-08-01 ENCOUNTER — OFFICE VISIT (OUTPATIENT)
Dept: ORTHOPEDIC SURGERY | Age: 38
End: 2023-08-01
Payer: MEDICAID

## 2023-08-01 VITALS — HEIGHT: 60 IN | BODY MASS INDEX: 28.27 KG/M2 | WEIGHT: 144 LBS

## 2023-08-01 DIAGNOSIS — G89.29 CHRONIC THORACIC BACK PAIN, UNSPECIFIED BACK PAIN LATERALITY: Primary | ICD-10-CM

## 2023-08-01 DIAGNOSIS — M54.6 CHRONIC THORACIC BACK PAIN, UNSPECIFIED BACK PAIN LATERALITY: Primary | ICD-10-CM

## 2023-08-01 DIAGNOSIS — M41.9 SCOLIOSIS OF THORACIC SPINE, UNSPECIFIED SCOLIOSIS TYPE: ICD-10-CM

## 2023-08-01 PROCEDURE — 99214 OFFICE O/P EST MOD 30 MIN: CPT | Performed by: PHYSICIAN ASSISTANT

## 2023-08-01 PROCEDURE — 1036F TOBACCO NON-USER: CPT | Performed by: PHYSICIAN ASSISTANT

## 2023-08-01 PROCEDURE — G8419 CALC BMI OUT NRM PARAM NOF/U: HCPCS | Performed by: PHYSICIAN ASSISTANT

## 2023-08-01 PROCEDURE — G8427 DOCREV CUR MEDS BY ELIG CLIN: HCPCS | Performed by: PHYSICIAN ASSISTANT

## 2023-08-01 RX ORDER — MELOXICAM 15 MG/1
15 TABLET ORAL DAILY PRN
Qty: 30 TABLET | Refills: 0 | Status: SHIPPED | OUTPATIENT
Start: 2023-08-01 | End: 2023-08-31

## 2023-08-01 NOTE — PROGRESS NOTES
FOLLOW UP: SPINE    8/1/2023     CHIEF COMPLAINT:    Chief Complaint   Patient presents with    Follow-up     THORACIC SPINE       HISTORY OF PRESENT ILLNESS:              The patient is a 42 y.o. female history of depression, fibromyalgia here to f/u after PT and pharmacologic care for chronic worsening midline thoracic back pain. She reports aching and stabbing midline to right constant thoracic pain. Her symptoms have been present since childhood. She feels symptoms have been worsening over the last few years. Her pain is constant but increased with sitting, standing, walking or resting. Denies any true palliative factors aside from meloxicam.  Conservative care includes PT for both thoracic and lumbar spine, gabapentin, Elavil, ibuprofen, Mobic. Very intermittently she will experience some tingling in her hands and feet. She reports subjective arm weakness. She denies any clear-cut upper or lower extremity radiating pain. She denies any progressive extremity weakness. She denies any fine motor difficulty. She denies any recent fevers chills or infections. No recent injury or trauma. The pain assessment was noted & reviewed in the medical record today. Current/Past Treatment:   Physical Therapy: Yes, L-spine and thoracic spine PT notes reviewed  Chiropractic:     Injection:     Medications:            NSAIDS: Ibuprofen, Mobic with some benefit            Muscle relaxer:              Steriods:              Neuropathic medications: Gabapentin            Opioids: Elavil            Other: Topical from her rheumatologist  Surgery/Consult: no    Work Status/Functionality: N/A    Past Medical History: Medical history form was reviewed today & scanned into the media tab  Past Medical History:   Diagnosis Date    Depression     Fibromyalgia     Preeclampsia       Past Surgical History:     No past surgical history on file.   Current Medications:     Current Outpatient Medications:     hydrOXYzine HCl

## 2023-08-01 NOTE — TELEPHONE ENCOUNTER
Patient last seen 8/1/2023 and medication last filled 6/21/2023:         Impression:  1) Chronic thoracic pain  2) Scoliosis, thoracic DDD  3) Chronic low back pain  4) Fibro--seeing Rheumatology   5) Depression         Plan:   1) Given persistent chronic pain I recommend a thoracic MRI for further evaluation.   2) Cont Mobic PRN--side effect/risk discussed  3) Cont HEP  4) Discussed concerning signs and symptoms  5) F/u to review MRI               Caty Parsons PA-C, MPAS  Board Certified by the [address] [place] [address]

## 2023-08-02 ENCOUNTER — HOSPITAL ENCOUNTER (OUTPATIENT)
Dept: PHYSICAL THERAPY | Age: 38
Setting detail: THERAPIES SERIES
Discharge: HOME | End: 2023-08-02
Payer: MEDICAID

## 2023-08-02 PROCEDURE — 97110 THERAPEUTIC EXERCISES: CPT

## 2023-08-02 PROCEDURE — 97140 MANUAL THERAPY 1/> REGIONS: CPT

## 2023-08-02 PROCEDURE — G0283 ELEC STIM OTHER THAN WOUND: HCPCS

## 2023-08-02 NOTE — FLOWSHEET NOTE
The [facility]      Physical Therapy Treatment Note/ Progress Report:     Date:  2023    Patient Name:  Allyson Alvarez    :  1985  MRN: [id number]  Restrictions/Precautions:    Medical/Treatment Diagnosis Information:  Diagnosis: M54.6 chronic thoracic pain, M41.9 scoliosis of thoracic spine  Treatment Diagnosis: M54.2 neck pain, M54.6 thoracic pain  Insurance/Certification information:   [facility]  Physician Information:   YVAN Daugherty  Has the plan of care been signed (Y/N):        [x]  Yes  []  No     Date of Patient follow up with Physician: not scheduled    Is this a Progress Report:     []  Yes  [x]  No     If Yes:  Date Range for reporting period:  Beginning:  ------------ Ending:     Progress report will be due (10 Rx or 30 days whichever is less): 6/7/10     Recertification will be due (POC Duration  / 90 days whichever is less): 10/5/23      Visit # Insurance Allowable Auth Required   In Person 7 BMN []  Yes     []  No    Tele Health   []  Yes     []  No    Total 7       Functional Scale: NDI    Date assessed:  NV      Latex Allergy:  [x]NO      []YES  Preferred Language for Healthcare:   [x]English       []other:    Pain level:  5/10     SUBJECTIVE: Pt reports ? R sided soreness today, overall, even into lower body. Pt also reports she just feels achy and sore. Pt saw [facility] yesterday who is ordering an MRI.      OBJECTIVE: See eval  Observation:   Test measurements:      RESTRICTIONS/PRECAUTIONS: none    Exercises/Interventions:   Therapeutic Ex (59936) Sets/sec Reps Notes/CUES HEP   Scap squeeze 7-8\" 10  x   Sidelying open book 2 5 Added 7/10 x   LS S 10\" 5  x   SA punch 3 10  x   Chin tuck 1 10 VC x   Bilat ER 2 10 RTB, added 7/10 x   Pec S 5\" 5 Added , on post x   Supine horiz ABD 2 10 RTB, added  x                              Manual Intervention (47739)       STM to R cervical paraspinals, thoracic paraspinals, LS

## 2023-08-07 ENCOUNTER — HOSPITAL ENCOUNTER (OUTPATIENT)
Dept: PHYSICAL THERAPY | Age: 38
Setting detail: THERAPIES SERIES
Discharge: HOME | End: 2023-08-07
Payer: MEDICAID

## 2023-08-07 PROCEDURE — 97110 THERAPEUTIC EXERCISES: CPT

## 2023-08-07 PROCEDURE — 97140 MANUAL THERAPY 1/> REGIONS: CPT

## 2023-08-07 NOTE — FLOWSHEET NOTE
pain     Therapist goals for Patient:   Short Term Goals: To be achieved in: 2 weeks  1. Independent in HEP and progression per patient tolerance, in order to prevent re-injury. [x] Progressing: [] Met: [] Not Met: [] Adjusted  2. Patient will have a decrease in pain to facilitate improvement in movement, function, and ADLs as indicated by Functional Deficits. [x] Progressing: [] Met: [] Not Met: [] Adjusted     Long Term Goals: To be achieved in: 12 weeks  1. Disability index score of 20% or less for the NDI to assist with reaching prior level of function. [x] Progressing: [] Met: [] Not Met: [] Adjusted  2. Patient will demonstrate increased AROM to [facility] of cervical/thoracic spine to allow for proper joint functioning as indicated by patients Functional Deficits. [x] Progressing: [] Met: [] Not Met: [] Adjusted  3. Patient will demonstrate an increase in postural awareness and control and activation of  Deep cervical stabilizers to allow for proper functional mobility as indicated by patients Functional Deficits. [x] Progressing: [] Met: [] Not Met: [] Adjusted  4. Patient will return to modified functional activities without increased symptoms or restriction. [x] Progressing: [] Met: [] Not Met: [] Adjusted  5. Pt will exhibit self posture correction in clinic. (patient specific functional goal)    [x] Progressing: [] Met: [] Not Met: [] Adjusted          Access Code: [email or web address]  URL: [email or web address]  Date: 07/05/2023  Prepared by: Willy Edmondson    Exercises  - Supine Scapular Protraction in Flexion with Dumbbells  - 1 x daily - 7 x weekly - 1 sets - 10 reps  - Supine Cervical Retraction with Towel  - 1 x daily - 7 x weekly - 1 sets - 10 reps  - Seated Scapular Retraction  - 1 x daily - 7 x weekly - 1 sets - 10 reps  - Gentle Levator Scapulae Stretch  - 1 x daily - 7 x weekly - 1 sets - 5 reps - 10 sec hold    Overall Progression Towards Functional goals/ Treatment Progress Update:  []

## 2023-08-09 ENCOUNTER — HOSPITAL ENCOUNTER (OUTPATIENT)
Dept: PHYSICAL THERAPY | Age: 38
Setting detail: THERAPIES SERIES
Discharge: HOME | End: 2023-08-09
Payer: MEDICAID

## 2023-08-09 ENCOUNTER — HOSPITAL ENCOUNTER (OUTPATIENT)
Dept: MRI IMAGING | Age: 38
Discharge: HOME | End: 2023-08-09
Payer: MEDICAID

## 2023-08-09 DIAGNOSIS — M54.6 CHRONIC THORACIC BACK PAIN, UNSPECIFIED BACK PAIN LATERALITY: ICD-10-CM

## 2023-08-09 DIAGNOSIS — M41.9 SCOLIOSIS OF THORACIC SPINE, UNSPECIFIED SCOLIOSIS TYPE: ICD-10-CM

## 2023-08-09 DIAGNOSIS — G89.29 CHRONIC THORACIC BACK PAIN, UNSPECIFIED BACK PAIN LATERALITY: ICD-10-CM

## 2023-08-09 PROCEDURE — 72146 MRI CHEST SPINE W/O DYE: CPT

## 2023-08-09 PROCEDURE — 97110 THERAPEUTIC EXERCISES: CPT

## 2023-08-09 PROCEDURE — 97140 MANUAL THERAPY 1/> REGIONS: CPT

## 2023-08-09 NOTE — FLOWSHEET NOTE
[facility]      Physical Therapy Treatment Note/ Progress Report:     Date:  2023    Patient Name:  Ender Correia    :  1985  MRN: [id number]  Restrictions/Precautions:    Medical/Treatment Diagnosis Information:  Diagnosis: M54.6 chronic thoracic pain, M41.9 scoliosis of thoracic spine  Treatment Diagnosis: M54.2 neck pain, M54.6 thoracic pain  Insurance/Certification information:   [facility]  Physician Information:   YVAN Velarde  Has the plan of care been signed (Y/N):        [x]  Yes  []  No     Date of Patient follow up with Physician: not scheduled    Is this a Progress Report:     []  Yes  [x]  No     If Yes:  Date Range for reporting period:  Beginning:  ------------ Ending:     Progress report will be due (10 Rx or 30 days whichever is less): 23 NEXT VISIT    Recertification will be due (POC Duration  / 90 days whichever is less): 10/5/23      Visit # Insurance Allowable Auth Required   In Person 9 BMN []  Yes     []  No    Tele Health   []  Yes     []  No    Total 9       Functional Scale: NDI    Date assessed:  NV      Latex Allergy:  [x]NO      []YES  Preferred Language for Healthcare:   [x]English       []other:    Pain level:  3/10     SUBJECTIVE: Pt reports she didn't have a flare up after Monday's treatment. Pt continues to use deep breathing as a way to manage her pain as well.      OBJECTIVE: See eval  Observation:   Test measurements:      RESTRICTIONS/PRECAUTIONS: none    Exercises/Interventions:   Therapeutic Ex (89136) Sets/sec Reps Notes/CUES HEP   Scap squeeze 7-8\" 10  x   Sidelying open book 2 5 Added 7/10 x   LS S 10\" 5  x   SA punch 3 10 1# x   Chin tuck 1 10 VC x   Bilat ER 2 10 RTB, added 7/10 x   Pec S 5\" 5 Added , on post x   Supine horiz ABD 2 10 RTB, added  x   Stretching to L side x                       Manual Intervention (24287)       STM to R cervical paraspinals, thoracic

## 2023-08-11 ENCOUNTER — TELEPHONE (OUTPATIENT)
Dept: ORTHOPEDIC SURGERY | Age: 38
End: 2023-08-11

## 2023-08-11 NOTE — TELEPHONE ENCOUNTER
L/M for the patient in regards to scheduling a follow up visit for patients MRI WO CONTRAST THORACIC test results. Results will not be given over the phone and require an office visit for discussion. Please schedule an office visit with Cr Mejia PA-C    Our office number is [phone].

## 2023-08-14 ENCOUNTER — HOSPITAL ENCOUNTER (OUTPATIENT)
Dept: PHYSICAL THERAPY | Age: 38
Setting detail: THERAPIES SERIES
Discharge: HOME | End: 2023-08-14
Payer: MEDICAID

## 2023-08-14 ENCOUNTER — OFFICE VISIT (OUTPATIENT)
Dept: ORTHOPEDIC SURGERY | Age: 38
End: 2023-08-14
Payer: MEDICAID

## 2023-08-14 VITALS — HEIGHT: 60 IN | WEIGHT: 144 LBS | BODY MASS INDEX: 28.27 KG/M2

## 2023-08-14 DIAGNOSIS — M54.2 CHRONIC NECK PAIN: ICD-10-CM

## 2023-08-14 DIAGNOSIS — M54.6 CHRONIC THORACIC BACK PAIN, UNSPECIFIED BACK PAIN LATERALITY: ICD-10-CM

## 2023-08-14 DIAGNOSIS — M79.18 MYOFASCIAL PAIN: ICD-10-CM

## 2023-08-14 DIAGNOSIS — M41.9 SCOLIOSIS OF THORACIC SPINE, UNSPECIFIED SCOLIOSIS TYPE: Primary | ICD-10-CM

## 2023-08-14 DIAGNOSIS — G89.29 CHRONIC NECK PAIN: ICD-10-CM

## 2023-08-14 DIAGNOSIS — M54.12 CERVICAL RADICULITIS: ICD-10-CM

## 2023-08-14 DIAGNOSIS — G89.29 CHRONIC THORACIC BACK PAIN, UNSPECIFIED BACK PAIN LATERALITY: ICD-10-CM

## 2023-08-14 PROCEDURE — G8419 CALC BMI OUT NRM PARAM NOF/U: HCPCS | Performed by: PHYSICIAN ASSISTANT

## 2023-08-14 PROCEDURE — G8427 DOCREV CUR MEDS BY ELIG CLIN: HCPCS | Performed by: PHYSICIAN ASSISTANT

## 2023-08-14 PROCEDURE — 1036F TOBACCO NON-USER: CPT | Performed by: PHYSICIAN ASSISTANT

## 2023-08-14 PROCEDURE — 99214 OFFICE O/P EST MOD 30 MIN: CPT | Performed by: PHYSICIAN ASSISTANT

## 2023-08-14 PROCEDURE — 97110 THERAPEUTIC EXERCISES: CPT

## 2023-08-14 PROCEDURE — 97140 MANUAL THERAPY 1/> REGIONS: CPT

## 2023-08-14 NOTE — PROGRESS NOTES
FOLLOW UP: SPINE    8/14/2023     CHIEF COMPLAINT:    Chief Complaint   Patient presents with    Follow-up     MRI RESULTS THORACIC SPINE       HISTORY OF PRESENT ILLNESS:              The patient is a 42 y.o. female history of depression, fibromyalgia here to f/u to review thoracic MRI for chronic worsening midline thoracic back pain. She reports aching and stabbing midline to right constant thoracic pain. Her symptoms have been present since childhood. Today she also reports chronic underlying right-sided neck/trap pain radiating to her right upper extremity. She feels symptoms have been worsening over the last few years. Her pain is constant but increased with sitting, standing, walking or resting. Denies any true palliative factors aside from meloxicam.  Conservative care includes PT, gabapentin, Elavil, ibuprofen, Mobic. Very intermittently she will experience some tingling in her hands and feet. She reports subjective arm weakness. She denies any clear-cut lower extremity radiating pain. She denies any progressive extremity weakness. She denies any fine motor difficulty. She denies any recent fevers chills or infections. No recent injury or trauma. The pain assessment was noted & reviewed in the medical record today. Current/Past Treatment:   Physical Therapy: Yes PT notes reviewed  Chiropractic:     Injection:     Medications:            NSAIDS: Ibuprofen, Mobic with some benefit            Muscle relaxer:              Steriods:              Neuropathic medications: Gabapentin            Opioids: Elavil            Other: Topical from her rheumatologist  Surgery/Consult: no    Work Status/Functionality: N/A    Past Medical History: Medical history form was reviewed today & scanned into the media tab  Past Medical History:   Diagnosis Date    Depression     Fibromyalgia     Preeclampsia       Past Surgical History:     No past surgical history on file.   Current Medications:     Current

## 2023-08-21 ENCOUNTER — HOSPITAL ENCOUNTER (OUTPATIENT)
Dept: PHYSICAL THERAPY | Age: 38
Setting detail: THERAPIES SERIES
Discharge: HOME | End: 2023-08-21
Payer: MEDICAID

## 2023-08-21 PROCEDURE — 97110 THERAPEUTIC EXERCISES: CPT

## 2023-08-21 NOTE — FLOWSHEET NOTE
goals/ Treatment Progress Update:  [] Patient is progressing as expected towards functional goals listed. [x] Progression is slowed due to complexities/Impairments listed. [] Progression has been slowed due to co-morbidities. [] Plan just implemented, too soon to assess goals progression <30days   [] Goals require adjustment due to lack of progress  [] Patient is not progressing as expected and requires additional follow up with physician  [] Other    Prognosis for POC: [x] Good [] Fair  [] Poor      Patient requires continued skilled intervention: [x] Yes  [] No    Treatment/Activity Tolerance:  [x] Patient able to complete treatment  [] Patient limited by fatigue  [] Patient limited by pain    [] Patient limited by other medical complications  [] Other:       PLAN: See eval  [x] Continue per plan of care [] Alter current plan (see comments above)  [] Plan of care initiated [] Hold pending MD visit [] Discharge    Electronically signed by:  Cuauhtemoc Cantu PT    Note: If patient does not return for scheduled/ recommended follow up visits, this note will serve as a discharge from care along with most recent update on progress.

## 2023-08-24 ENCOUNTER — OFFICE VISIT (OUTPATIENT)
Age: 38
End: 2023-08-24

## 2023-08-24 ENCOUNTER — TELEPHONE (OUTPATIENT)
Dept: ORTHOPEDIC SURGERY | Age: 38
End: 2023-08-24

## 2023-08-24 DIAGNOSIS — F41.9 ANXIETY: Primary | ICD-10-CM

## 2023-08-24 ASSESSMENT — PROMIS GLOBAL HEALTH SCALE
SUM OF RESPONSES TO QUESTIONS 2, 4, 5, & 10: 10
IN GENERAL, HOW WOULD YOU RATE YOUR PHYSICAL HEALTH [ON A SCALE OF 1 (POOR) TO 5 (EXCELLENT)]?: 3
IN THE PAST 7 DAYS, HOW OFTEN HAVE YOU BEEN BOTHERED BY EMOTIONAL PROBLEMS, SUCH AS FEELING ANXIOUS, DEPRESSED, OR IRRITABLE [ON A SCALE FROM 1 (NEVER) TO 5 (ALWAYS)]?: 2
IN GENERAL, WOULD YOU SAY YOUR HEALTH IS...[ON A SCALE OF 1 (POOR) TO 5 (EXCELLENT)]: 3
IN THE PAST 7 DAYS, HOW WOULD YOU RATE YOUR FATIGUE ON AVERAGE [ON A SCALE FROM 1 (NONE) TO 5 (VERY SEVERE)]?: 4
TO WHAT EXTENT ARE YOU ABLE TO CARRY OUT YOUR EVERYDAY PHYSICAL ACTIVITIES SUCH AS WALKING, CLIMBING STAIRS, CARRYING GROCERIES, OR MOVING A CHAIR [ON A SCALE OF 1 (NOT AT ALL) TO 5 (COMPLETELY)]?: 3
IN GENERAL, PLEASE RATE HOW WELL YOU CARRY OUT YOUR USUAL SOCIAL ACTIVITIES (INCLUDES ACTIVITIES AT HOME, AT WORK, AND IN YOUR COMMUNITY, AND RESPONSIBILITIES AS A PARENT, CHILD, SPOUSE, EMPLOYEE, FRIEND, ETC) [ON A SCALE OF 1 (POOR) TO 5 (EXCELLENT)]?: 3
IN THE PAST 7 DAYS, HOW WOULD YOU RATE YOUR PAIN ON AVERAGE [ON A SCALE FROM 0 (NO PAIN) TO 10 (WORST IMAGINABLE PAIN)]?: 3
IN GENERAL, WOULD YOU SAY YOUR QUALITY OF LIFE IS...[ON A SCALE OF 1 (POOR) TO 5 (EXCELLENT)]: 3
SUM OF RESPONSES TO QUESTIONS 3, 6, 7, & 8: 13
IN GENERAL, HOW WOULD YOU RATE YOUR SATISFACTION WITH YOUR SOCIAL ACTIVITIES AND RELATIONSHIPS [ON A SCALE OF 1 (POOR) TO 5 (EXCELLENT)]?: 2
IN GENERAL, HOW WOULD YOU RATE YOUR MENTAL HEALTH, INCLUDING YOUR MOOD AND YOUR ABILITY TO THINK [ON A SCALE OF 1 (POOR) TO 5 (EXCELLENT)]?: 3

## 2023-08-24 NOTE — TELEPHONE ENCOUNTER
Patient contacted regarding Cervical MRI WO CONTRAST  denial.  Patient will need to 4-6 weeks of physical therapy, then follow up with YVAN Rebollar for next plan of care.   If Cervical MRI WO CONTRAST is still warranted at that time, we can re-submit for approval.

## 2023-08-24 NOTE — PROGRESS NOTES
Behavioral Health Consultation   Bree Ochoa, PhD  Clinical Psychologist  8/24/2023      Time spent with Patient: 29 minutes 2:36 - 3:05  This is patient's 6th  [facility] appointment. Reason for Consult:   Chief Complaint   Patient presents with    Anxiety       Referring Provider: Yazmin Farah MD    Subjective  PT Improvement Questions  How is (target problem) going for you? Same, better, or worse? same    What specifically has changed (if anything)? Perspective is changing, working to be kinder - at times is hard on herself - is catching herself and notices stress and pain increase   Spouse is showing acceptance of her flare-ups    Has anyone else noticed any change(s)? If so, what? N/a    What do you think is causing the change or keeping it from getting worse? Finished chronic pain classes, book remains very valuable. Is managing the flare-ups more effectively  working to manage difficult emotions. Self talk has improved. Started PT for back       Plan Implementation Questions    Recommendations to Patient:   1. Continue with current coping strategies - accepting difficult emotions      What part were you able to do? All of it  Did it help/what were the results? Yes      FUNCTIONAL ANALYSIS (if applicable)     Risk Assessment: Patient is judged to be at low risk for harm to self/others due to no current thoughts/reports of suicide and homicide. Objective  Level of distress: Medium  Orientation: Person, Place, Time, and Situation  Appearance: Well-groomed, Awake, Alert, and Good eye contact  Other: mood is euthymic        8/24/23 PROMIS-10 Scores: Physical: 13 Mental: 10    PROMIS Raw Score   WNL Symptoms /Impairment     Mild Moderate Severe   Global Physical Health 15-20 13-14 9-12 4-8   Global Mental Health 14-20 11-13 7-10 4-6       Assessment and Plan  Anxiety. Patient reports continued improvement in functioning.   She is working to actively manage her pain, and has a growing awareness of the

## 2023-08-30 ENCOUNTER — HOSPITAL ENCOUNTER (OUTPATIENT)
Dept: PHYSICAL THERAPY | Age: 38
Setting detail: THERAPIES SERIES
Discharge: HOME | End: 2023-08-30
Payer: MEDICAID

## 2023-08-30 PROCEDURE — 97110 THERAPEUTIC EXERCISES: CPT

## 2023-08-30 NOTE — FLOWSHEET NOTE
proprioception of scapular, scapulothoracic and UE control with self care, reaching, carrying, lifting, house/yardwork, driving/computer work      Manual Treatments:  PROM / STM / Oscillations-Mobs:  G-I, II, III, IV (PA's, Inf., Post.)  [x] (36653) Provided manual therapy to mobilize soft tissue/joints of cervical/CT, scapular GHJ and UE for the purpose of modulating pain, promoting relaxation,  increasing ROM, reducing/eliminating soft tissue swelling/inflammation/restriction, improving soft tissue extensibility and allowing for proper ROM for normal function with self care, reaching, carrying, lifting, house/yardwork, driving/computer work    Modalities:   [] GAME READY (VASO)- for significant edema, swelling, pain control. Charges:  Timed Code Treatment Minutes: 38   Total Treatment Minutes:  38      [] EVAL (LOW) 62547 (typically 20 minutes face-to-face)  [] EVAL (MOD) 87448 (typically 30 minutes face-to-face)  [] EVAL (HIGH) 35199 (typically 45 minutes face-to-face)  [] RE-EVAL     [x] ZH(21474) x   3  [] IONTO  [] NMR (59181) x     [] VASO  [] Manual (48697) x    1 [] Other:  [] TA x      [] Mech Traction (50018)  [] ES(attended) (52365)      [] ES (un) (31822):    ASSESSMENT:  Pt tolerated treatment well however progress will be slow. Pt is very sensitive to exercise and fatigues easily due to fibromyalgia. Pt will continue to benefit from ? posture strength and stability as well as ? myofascial tightness throughout R shoulder girdle. PT opted to not perform manual treatment as pt reports minimal to no therapeutic benefit. Access Code: [email or web address]  URL: [email or web address]  Date: 07/10/2023  Prepared by: Vincenzo Roots    Exercises  - Supine Scapular Protraction in Flexion with Dumbbells  - 1 x daily - 7 x weekly - 1 sets - 10 reps  - Supine Cervical Retraction with Towel  - 1 x daily - 7 x weekly - 1 sets - 10 reps  - Seated Scapular Retraction  - 1 x daily - 7 x weekly - 1 sets - 10 reps  -

## 2023-09-06 ENCOUNTER — HOSPITAL ENCOUNTER (OUTPATIENT)
Dept: PHYSICAL THERAPY | Age: 38
Setting detail: THERAPIES SERIES
Discharge: HOME | End: 2023-09-06

## 2023-09-06 NOTE — FLOWSHEET NOTE
Physical Therapy  Cancellation/No-show Note  Patient Name:  Titus Leavitt  :  1985   Date:  2023  Cancelled visits to date: 1  No-shows to date: 0    For today's appointment patient:  [x]  Cancelled  []  Rescheduled appointment  []  No-show     Reason given by patient:  []  Patient ill  []  Conflicting appointment  []  No transportation    []  Conflict with work  [x]  No reason given  []  Other:     Comments:      Electronically signed by:  Cuauhtemoc Cantu PT, PT

## 2023-09-19 DIAGNOSIS — F41.9 ANXIETY: ICD-10-CM

## 2023-09-19 NOTE — TELEPHONE ENCOUNTER
Medication:   Requested Prescriptions     Pending Prescriptions Disp Refills    hydrOXYzine HCl (ATARAX) 25 MG tablet [Pharmacy Med Name: HYDROXYZINE HCL 25MG TABS (WHITE)] 30 tablet 0     Sig: TAKE 1 TABLET BY MOUTH EVERY 8 HOURS AS NEEDED FOR ITCHING OR ANXIETY        Last Filled:      Patient Phone Number: [phone] (home)     Last appt: 6/9/2023   Next appt: 12/11/2023    Last OARRS:        No data to display

## 2023-09-20 RX ORDER — HYDROXYZINE HYDROCHLORIDE 25 MG/1
25 TABLET, FILM COATED ORAL EVERY 8 HOURS PRN
Qty: 30 TABLET | Refills: 0 | Status: SHIPPED | OUTPATIENT
Start: 2023-09-20 | End: 2023-09-30

## 2023-09-26 DIAGNOSIS — G89.29 CHRONIC NECK PAIN: ICD-10-CM

## 2023-09-26 DIAGNOSIS — M54.12 CERVICAL RADICULITIS: ICD-10-CM

## 2023-09-26 DIAGNOSIS — M54.2 CHRONIC NECK PAIN: ICD-10-CM

## 2023-09-26 DIAGNOSIS — M54.6 CHRONIC THORACIC BACK PAIN, UNSPECIFIED BACK PAIN LATERALITY: ICD-10-CM

## 2023-09-26 DIAGNOSIS — M41.9 SCOLIOSIS OF THORACIC SPINE, UNSPECIFIED SCOLIOSIS TYPE: Primary | ICD-10-CM

## 2023-09-26 DIAGNOSIS — G89.29 CHRONIC THORACIC BACK PAIN, UNSPECIFIED BACK PAIN LATERALITY: ICD-10-CM

## 2023-09-26 DIAGNOSIS — M79.18 MYOFASCIAL PAIN: ICD-10-CM

## 2023-09-26 RX ORDER — DICLOFENAC SODIUM 75 MG/1
75 TABLET, DELAYED RELEASE ORAL 2 TIMES DAILY PRN
Qty: 60 TABLET | Refills: 0 | Status: SHIPPED | OUTPATIENT
Start: 2023-09-26 | End: 2023-10-26

## 2023-09-27 ENCOUNTER — HOSPITAL ENCOUNTER (OUTPATIENT)
Dept: PHYSICAL THERAPY | Age: 38
Setting detail: THERAPIES SERIES
Discharge: HOME | End: 2023-09-27
Payer: MEDICAID

## 2023-09-27 PROCEDURE — 97140 MANUAL THERAPY 1/> REGIONS: CPT

## 2023-09-27 PROCEDURE — 97530 THERAPEUTIC ACTIVITIES: CPT

## 2023-09-27 PROCEDURE — 97110 THERAPEUTIC EXERCISES: CPT

## 2023-10-02 ENCOUNTER — APPOINTMENT (OUTPATIENT)
Dept: PHYSICAL THERAPY | Age: 38
End: 2023-10-02
Payer: MEDICAID

## 2023-10-04 ENCOUNTER — HOSPITAL ENCOUNTER (OUTPATIENT)
Dept: PHYSICAL THERAPY | Age: 38
Setting detail: THERAPIES SERIES
Discharge: HOME | End: 2023-10-04

## 2023-10-04 NOTE — FLOWSHEET NOTE
Physical Therapy  Cancellation/No-show Note  Patient Name:  Bryan Knee  :  1985   Date:  10/4/2023  Cancelled visits to date: 2  No-shows to date: 0    For today's appointment patient:  [x]  Cancelled  []  Rescheduled appointment  []  No-show     Reason given by patient:  []  Patient ill  []  Conflicting appointment  []  No transportation    []  Conflict with work  []  No reason given  [x]  Other:  insurance reasons, don't have authorization   Comments:      Electronically signed by:  Adalberto Spicer PT, PT

## 2023-10-05 ENCOUNTER — OFFICE VISIT (OUTPATIENT)
Age: 38
End: 2023-10-05

## 2023-10-05 DIAGNOSIS — F41.9 ANXIETY: Primary | ICD-10-CM

## 2023-10-05 ASSESSMENT — PROMIS GLOBAL HEALTH SCALE
IN THE PAST 7 DAYS, HOW WOULD YOU RATE YOUR FATIGUE ON AVERAGE [ON A SCALE FROM 1 (NONE) TO 5 (VERY SEVERE)]?: 3
SUM OF RESPONSES TO QUESTIONS 3, 6, 7, & 8: 12
IN THE PAST 7 DAYS, HOW WOULD YOU RATE YOUR PAIN ON AVERAGE [ON A SCALE FROM 0 (NO PAIN) TO 10 (WORST IMAGINABLE PAIN)]?: 3
IN GENERAL, HOW WOULD YOU RATE YOUR PHYSICAL HEALTH [ON A SCALE OF 1 (POOR) TO 5 (EXCELLENT)]?: 3
IN GENERAL, WOULD YOU SAY YOUR HEALTH IS...[ON A SCALE OF 1 (POOR) TO 5 (EXCELLENT)]: 3
IN GENERAL, HOW WOULD YOU RATE YOUR MENTAL HEALTH, INCLUDING YOUR MOOD AND YOUR ABILITY TO THINK [ON A SCALE OF 1 (POOR) TO 5 (EXCELLENT)]?: 3
TO WHAT EXTENT ARE YOU ABLE TO CARRY OUT YOUR EVERYDAY PHYSICAL ACTIVITIES SUCH AS WALKING, CLIMBING STAIRS, CARRYING GROCERIES, OR MOVING A CHAIR [ON A SCALE OF 1 (NOT AT ALL) TO 5 (COMPLETELY)]?: 3
IN THE PAST 7 DAYS, HOW OFTEN HAVE YOU BEEN BOTHERED BY EMOTIONAL PROBLEMS, SUCH AS FEELING ANXIOUS, DEPRESSED, OR IRRITABLE [ON A SCALE FROM 1 (NEVER) TO 5 (ALWAYS)]?: 2
IN GENERAL, WOULD YOU SAY YOUR QUALITY OF LIFE IS...[ON A SCALE OF 1 (POOR) TO 5 (EXCELLENT)]: 3
IN GENERAL, HOW WOULD YOU RATE YOUR SATISFACTION WITH YOUR SOCIAL ACTIVITIES AND RELATIONSHIPS [ON A SCALE OF 1 (POOR) TO 5 (EXCELLENT)]?: 2
IN GENERAL, PLEASE RATE HOW WELL YOU CARRY OUT YOUR USUAL SOCIAL ACTIVITIES (INCLUDES ACTIVITIES AT HOME, AT WORK, AND IN YOUR COMMUNITY, AND RESPONSIBILITIES AS A PARENT, CHILD, SPOUSE, EMPLOYEE, FRIEND, ETC) [ON A SCALE OF 1 (POOR) TO 5 (EXCELLENT)]?: 2
SUM OF RESPONSES TO QUESTIONS 2, 4, 5, & 10: 10

## 2023-10-05 NOTE — PROGRESS NOTES
Behavioral Health Consultation   Jonathan Alonzo, PhD  Clinical Psychologist  10/5/2023      Time spent with Patient: 35 minutes 2:55 -3:30  This is patient's 7th  [facility] appointment. Reason for Consult:   Chief Complaint   Patient presents with    Anxiety       Referring Provider: Pedro Forrester MD    Subjective  PT Improvement Questions  How is (target problem) going for you? Same, better, or worse? same, worse    What specifically has changed (if anything)? Fibro flare-ups have been very challenging  , she is working very hard to get through them. Marty's mother and brother is coming this month to visit - 10/22. Considering having her family come in from [facility] at a later date    Has anyone else noticed any change(s)? If so, what? N/a    What do you think is causing the change or keeping it from getting worse? Continues to use skills taught , working to manage emotions and hold steady \"I'm a mountain\"      Plan Implementation Questions    Recommendations to Patient:   1. Continue with current coping strategies   2. Practice + time = new habits of behavior     What part were you able to do? All of it  Did it help/what were the results? Yes      FUNCTIONAL ANALYSIS (if applicable)     Risk Assessment: Patient is judged to be at low risk for harm to self/others due to no current thoughts/reports of suicide and homicide. Objective  Level of distress: Medium  Orientation: Person, Place, Time, and Situation  Appearance: Well-groomed, Awake, Alert, and Good eye contact  Other: mood is euthymic        10/5/23 PROMIS-10 Scores: Physical: 12 Mental: 10    PROMIS Raw Score   WNL Symptoms /Impairment     Mild Moderate Severe   Global Physical Health 15-20 13-14 9-12 4-8   Global Mental Health 14-20 11-13 7-10 4-6       Assessment and Plan  Anxiety. Pt reports stability in functioning. She has had several flare-ups with her fibromyalgia, which has been frustrating.  She is working to manage increased pain and stress

## 2023-10-06 ENCOUNTER — TELEMEDICINE (OUTPATIENT)
Dept: FAMILY MEDICINE CLINIC | Age: 38
End: 2023-10-06
Payer: MEDICAID

## 2023-10-06 DIAGNOSIS — M79.7 FIBROMYALGIA: ICD-10-CM

## 2023-10-06 PROCEDURE — G8419 CALC BMI OUT NRM PARAM NOF/U: HCPCS | Performed by: FAMILY MEDICINE

## 2023-10-06 PROCEDURE — 1036F TOBACCO NON-USER: CPT | Performed by: FAMILY MEDICINE

## 2023-10-06 PROCEDURE — G8427 DOCREV CUR MEDS BY ELIG CLIN: HCPCS | Performed by: FAMILY MEDICINE

## 2023-10-06 PROCEDURE — G8484 FLU IMMUNIZE NO ADMIN: HCPCS | Performed by: FAMILY MEDICINE

## 2023-10-06 PROCEDURE — 99213 OFFICE O/P EST LOW 20 MIN: CPT | Performed by: FAMILY MEDICINE

## 2023-10-06 NOTE — PROGRESS NOTES
Subjective:      Patient ID: Odin Joshua is a 42 y.o. female. Odin Joshua, was evaluated through a synchronous (real-time) audio-video encounter. The patient (or guardian if applicable) is aware that this is a billable service, which includes applicable co-pays. This Virtual Visit was conducted with patient's (and/or legal guardian's) consent. Patient identification was verified, and a caregiver was present when appropriate. The patient was located at Home: [place] [address]  Provider was located at [facility] ([address]): [address], [address] [facility],  [address] [facility]       Total time spent for this encounter: Not billed by time    --Alessio Barker MD on 10/6/2023 at 3:03 PM    An electronic signature was used to authenticate this note. Other  This is a chronic (fu fibromyalgia) problem. The current episode started more than 1 year ago. The problem occurs constantly. The problem has been waxing and waning. Associated symptoms include arthralgias, myalgias and neck pain. Pertinent negatives include no chills or weakness. Nothing aggravates the symptoms. Treatments tried: gabapentin, The treatment provided mild relief. Review of Systems   Constitutional:  Negative for chills. Musculoskeletal:  Positive for arthralgias, myalgias and neck pain. Neurological:  Negative for weakness. Objective:   Physical Exam  Constitutional:       General: She is not in acute distress. Appearance: Normal appearance. She is not ill-appearing, toxic-appearing or diaphoretic. HENT:      Head: Normocephalic and atraumatic. Pulmonary:      Effort: No respiratory distress. Musculoskeletal:      Cervical back: Normal range of motion. Neurological:      General: No focal deficit present. Mental Status: She is alert and oriented to person, place, and time. Mental status is at baseline.    Psychiatric:         Mood and Affect: Mood normal.         Behavior:

## 2023-10-09 ENCOUNTER — HOSPITAL ENCOUNTER (OUTPATIENT)
Dept: PHYSICAL THERAPY | Age: 38
Setting detail: THERAPIES SERIES
Discharge: HOME | End: 2023-10-09
Payer: MEDICAID

## 2023-10-09 NOTE — FLOWSHEET NOTE
Physical Therapy  Cancellation/No-show Note  Patient Name:  Paulina Ghosh  :  1985   Date:  10/9/2023  Cancelled visits to date: 3  No-shows to date: 0    For today's appointment patient:  [x]  Cancelled  []  Rescheduled appointment  []  No-show     Reason given by patient:  []  Patient ill  []  Conflicting appointment  []  No transportation    []  Conflict with work  []  No reason given  [x]  Other:  insurance reasons, don't have authorization   Comments:      Electronically signed by:  Ping Nelson, PT, PT

## 2023-10-11 ENCOUNTER — HOSPITAL ENCOUNTER (OUTPATIENT)
Dept: PHYSICAL THERAPY | Age: 38
Setting detail: THERAPIES SERIES
Discharge: HOME | End: 2023-10-11
Payer: MEDICAID

## 2023-10-11 PROCEDURE — 97110 THERAPEUTIC EXERCISES: CPT

## 2023-10-11 NOTE — FLOWSHEET NOTE
Flexion with Dumbbells  - 1 x daily - 7 x weekly - 1 sets - 10 reps  - Supine Cervical Retraction with Towel  - 1 x daily - 7 x weekly - 1 sets - 10 reps  - Seated Scapular Retraction  - 1 x daily - 7 x weekly - 1 sets - 10 reps  - Gentle Levator Scapulae Stretch  - 1 x daily - 7 x weekly - 1 sets - 5 reps - 10 sec hold    GOALS:     Patient stated goal: ? pain     Therapist goals for Patient:   Short Term Goals: To be achieved in: 2 weeks  1. Independent in HEP and progression per patient tolerance, in order to prevent re-injury. [] Progressing: [x] Met: [] Not Met: [] Adjusted  2. Patient will have a decrease in pain to facilitate improvement in movement, function, and ADLs as indicated by Functional Deficits. [] Progressing: [x] Met: [] Not Met: [] Adjusted     Long Term Goals: To be achieved in: 12 weeks  1. Disability index score of 20% or less for the NDI to assist with reaching prior level of function. [x] Progressing: [] Met: [] Not Met: [] Adjusted  2. Patient will demonstrate increased AROM to [facility] of cervical/thoracic spine to allow for proper joint functioning as indicated by patients Functional Deficits. [x] Progressing: [] Met: [] Not Met: [] Adjusted  3. Patient will demonstrate an increase in postural awareness and control and activation of  Deep cervical stabilizers to allow for proper functional mobility as indicated by patients Functional Deficits. [x] Progressing: [] Met: [] Not Met: [] Adjusted  4. Patient will return to modified functional activities without increased symptoms or restriction. [x] Progressing: [] Met: [] Not Met: [] Adjusted  5. Pt will exhibit self posture correction in clinic. (patient specific functional goal)    [x] Progressing: [] Met: [] Not Met: [] Adjusted          Access Code: [email or web address]  URL: [email or web address]  Date: 07/05/2023  Prepared by: Baldev Everett    Exercises  - Supine Scapular Protraction in Flexion with Dumbbells  - 1 x daily - 7 x

## 2023-10-16 ENCOUNTER — HOSPITAL ENCOUNTER (OUTPATIENT)
Dept: PHYSICAL THERAPY | Age: 38
Setting detail: THERAPIES SERIES
Discharge: HOME | End: 2023-10-16
Payer: MEDICAID

## 2023-10-16 PROCEDURE — 97140 MANUAL THERAPY 1/> REGIONS: CPT

## 2023-10-16 PROCEDURE — 97110 THERAPEUTIC EXERCISES: CPT

## 2023-10-16 NOTE — FLOWSHEET NOTE
sense, posture, motor skill, proprioception of scapular, scapulothoracic and UE control with self care, reaching, carrying, lifting, house/yardwork, driving/computer work      Manual Treatments:  PROM / STM / Oscillations-Mobs:  G-I, II, III, IV (PA's, Inf., Post.)  [x] (05059) Provided manual therapy to mobilize soft tissue/joints of cervical/CT, scapular GHJ and UE for the purpose of modulating pain, promoting relaxation,  increasing ROM, reducing/eliminating soft tissue swelling/inflammation/restriction, improving soft tissue extensibility and allowing for proper ROM for normal function with self care, reaching, carrying, lifting, house/yardwork, driving/computer work    Modalities:   [] GAME READY (VASO)- for significant edema, swelling, pain control. Charges:  Timed Code Treatment Minutes: 38   Total Treatment Minutes:  38      [] EVAL (LOW) 71463 (typically 20 minutes face-to-face)  [] EVAL (MOD) 18863 (typically 30 minutes face-to-face)  [] EVAL (HIGH) 69103 (typically 45 minutes face-to-face)  [] RE-EVAL     [x] ZA(70312) x   2  [] IONTO  [] NMR (00907) x     [] VASO  [x] Manual (25908) x    1 [] Other:  [] TA x   1   [] Mech Traction (78810)  [] ES(attended) (38373)      [] ES (un) (47399):    ASSESSMENT:  Pt tolerated treatment well however progress will be slow. Pt is very sensitive to exercise and fatigues easily due to fibromyalgia. Pt will continue to benefit from ? posture strength and stability as well as ? myofascial tightness throughout R shoulder girdle. Access Code: [email or web address]  URL: [email or web address]  Date: 07/10/2023  Prepared by: Deandre May    Exercises  - Supine Scapular Protraction in Flexion with Dumbbells  - 1 x daily - 7 x weekly - 1 sets - 10 reps  - Supine Cervical Retraction with Towel  - 1 x daily - 7 x weekly - 1 sets - 10 reps  - Seated Scapular Retraction  - 1 x daily - 7 x weekly - 1 sets - 10 reps  - Gentle Levator Scapulae Stretch  - 1 x daily - 7 x weekly -

## 2023-10-18 ENCOUNTER — HOSPITAL ENCOUNTER (OUTPATIENT)
Dept: PHYSICAL THERAPY | Age: 38
Setting detail: THERAPIES SERIES
Discharge: HOME | End: 2023-10-18
Payer: MEDICAID

## 2023-10-18 PROCEDURE — 97140 MANUAL THERAPY 1/> REGIONS: CPT

## 2023-10-18 PROCEDURE — 97110 THERAPEUTIC EXERCISES: CPT

## 2023-10-23 ENCOUNTER — HOSPITAL ENCOUNTER (OUTPATIENT)
Dept: PHYSICAL THERAPY | Age: 38
Setting detail: THERAPIES SERIES
Discharge: HOME | End: 2023-10-23
Payer: MEDICAID

## 2023-10-23 PROCEDURE — 97110 THERAPEUTIC EXERCISES: CPT

## 2023-10-23 PROCEDURE — 97140 MANUAL THERAPY 1/> REGIONS: CPT

## 2023-10-25 ENCOUNTER — HOSPITAL ENCOUNTER (OUTPATIENT)
Dept: PHYSICAL THERAPY | Age: 38
Setting detail: THERAPIES SERIES
Discharge: HOME | End: 2023-10-25
Payer: MEDICAID

## 2023-10-25 PROCEDURE — 97110 THERAPEUTIC EXERCISES: CPT

## 2023-10-25 PROCEDURE — 20560 NDL INSJ W/O NJX 1 OR 2 MUSC: CPT

## 2023-10-25 PROCEDURE — 97140 MANUAL THERAPY 1/> REGIONS: CPT

## 2023-10-25 NOTE — FLOWSHEET NOTE
TA x   1   [] [facility] Traction (32546)  [] ES(attended) (42487)      [] ES (un) (75869):    ASSESSMENT:  Pt reported ? symptoms at end of visit due to improved thoracic mobility and ? myofascial restriction. Pt will continue to benefit from ? posture strength and stability throughout shoulder girdle and cervical musculature, improve thoracic mobility, and continued ? myofascial tightness    Access Code: [email or web address]  URL: [email or web address]  Date: 07/10/2023  Prepared by: Joseph Chaudhary    Exercises  - Supine Scapular Protraction in Flexion with Dumbbells  - 1 x daily - 7 x weekly - 1 sets - 10 reps  - Supine Cervical Retraction with Towel  - 1 x daily - 7 x weekly - 1 sets - 10 reps  - Seated Scapular Retraction  - 1 x daily - 7 x weekly - 1 sets - 10 reps  - Gentle Levator Scapulae Stretch  - 1 x daily - 7 x weekly - 1 sets - 5 reps - 10 sec hold    GOALS:     Patient stated goal: ? pain     Therapist goals for Patient:   Short Term Goals: To be achieved in: 2 weeks  1. Independent in HEP and progression per patient tolerance, in order to prevent re-injury. [] Progressing: [x] Met: [] Not Met: [] Adjusted  2. Patient will have a decrease in pain to facilitate improvement in movement, function, and ADLs as indicated by Functional Deficits. [] Progressing: [x] Met: [] Not Met: [] Adjusted     Long Term Goals: To be achieved in: 12 weeks  1. Disability index score of 20% or less for the NDI to assist with reaching prior level of function. [x] Progressing: [] Met: [] Not Met: [] Adjusted  2. Patient will demonstrate increased AROM to [facility] of cervical/thoracic spine to allow for proper joint functioning as indicated by patients Functional Deficits. [x] Progressing: [] Met: [] Not Met: [] Adjusted  3. Patient will demonstrate an increase in postural awareness and control and activation of  Deep cervical stabilizers to allow for proper functional mobility as indicated by patients Functional Deficits.    [x]

## 2023-10-30 ENCOUNTER — HOSPITAL ENCOUNTER (OUTPATIENT)
Dept: PHYSICAL THERAPY | Age: 38
Setting detail: THERAPIES SERIES
Discharge: HOME | End: 2023-10-30
Payer: MEDICAID

## 2023-10-30 PROCEDURE — 97110 THERAPEUTIC EXERCISES: CPT

## 2023-10-30 PROCEDURE — 97140 MANUAL THERAPY 1/> REGIONS: CPT

## 2023-10-30 NOTE — FLOWSHEET NOTE
related to improving balance, coordination, kinesthetic sense, posture, motor skill, proprioception of scapular, scapulothoracic and UE control with self care, reaching, carrying, lifting, house/yardwork, driving/computer work      Manual Treatments:  PROM / STM / Oscillations-Mobs:  G-I, II, III, IV (PA's, Inf., Post.)  [x] (11135) Provided manual therapy to mobilize soft tissue/joints of cervical/CT, scapular GHJ and UE for the purpose of modulating pain, promoting relaxation,  increasing ROM, reducing/eliminating soft tissue swelling/inflammation/restriction, improving soft tissue extensibility and allowing for proper ROM for normal function with self care, reaching, carrying, lifting, house/yardwork, driving/computer work    Modalities:   [] GAME READY (VASO)- for significant edema, swelling, pain control. Charges:  Timed Code Treatment Minutes: 40   Total Treatment Minutes:  40      [] EVAL (LOW) 71519 (typically 20 minutes face-to-face)  [] EVAL (MOD) 42549 (typically 30 minutes face-to-face)  [] EVAL (HIGH) 54841 (typically 45 minutes face-to-face)  [] RE-EVAL     [x] KK(91681) x   2  [] IONTO  [] NMR (46921) x     [] VASO  [x] Manual (37784) x    1 [] Other:  [] TA x   1   [] Mech Traction (59786)  [] ES(attended) (28352)      [] ES (un) (97975):    ASSESSMENT:  Pt reported ? symptoms at end of visit due to improved thoracic mobility and ? myofascial restriction. Pt will continue to benefit from ? posture strength and stability throughout shoulder girdle and cervical musculature, improve thoracic mobility, and continued ? myofascial tightness    Access Code: [email or web address]  URL: [email or web address]  Date: 07/10/2023  Prepared by: Carolina López    Exercises  - Supine Scapular Protraction in Flexion with Dumbbells  - 1 x daily - 7 x weekly - 1 sets - 10 reps  - Supine Cervical Retraction with Towel  - 1 x daily - 7 x weekly - 1 sets - 10 reps  - Seated Scapular Retraction  - 1 x daily - 7 x weekly - 1

## 2023-10-31 DIAGNOSIS — F41.9 ANXIETY: ICD-10-CM

## 2023-10-31 NOTE — TELEPHONE ENCOUNTER
Medication:   Requested Prescriptions     Pending Prescriptions Disp Refills    hydrOXYzine HCl (ATARAX) 25 MG tablet [Pharmacy Med Name: HYDROXYZINE HCL 25MG TABS (WHITE)] 30 tablet 0     Sig: TAKE 1 TABLET BY MOUTH EVERY 8 HOURS AS NEEDED FOR ITCHING OR ANXIETY        Last Filled:      Patient Phone Number: [phone] (home)     Last appt: 10/6/2023   Next appt: 12/11/2023    Last OARRS:        No data to display

## 2023-11-01 ENCOUNTER — HOSPITAL ENCOUNTER (OUTPATIENT)
Dept: PHYSICAL THERAPY | Age: 38
Setting detail: THERAPIES SERIES
Discharge: HOME | End: 2023-11-01
Payer: MEDICAID

## 2023-11-01 PROCEDURE — 20560 NDL INSJ W/O NJX 1 OR 2 MUSC: CPT

## 2023-11-01 PROCEDURE — 97110 THERAPEUTIC EXERCISES: CPT

## 2023-11-01 PROCEDURE — 97140 MANUAL THERAPY 1/> REGIONS: CPT

## 2023-11-01 RX ORDER — HYDROXYZINE HYDROCHLORIDE 25 MG/1
25 TABLET, FILM COATED ORAL EVERY 8 HOURS PRN
Qty: 30 TABLET | Refills: 0 | Status: SHIPPED | OUTPATIENT
Start: 2023-11-01 | End: 2023-11-11

## 2023-11-01 NOTE — FLOWSHEET NOTE
Progressing: [] Met: [] Not Met: [] Adjusted  5. Pt will exhibit self posture correction in clinic. (patient specific functional goal)    [x] Progressing: [] Met: [] Not Met: [] Adjusted          Access Code: [email or web address]  URL: [email or web address]  Date: 07/05/2023  Prepared by: Denisse Dumont    Exercises  - Supine Scapular Protraction in Flexion with Dumbbells  - 1 x daily - 7 x weekly - 1 sets - 10 reps  - Supine Cervical Retraction with Towel  - 1 x daily - 7 x weekly - 1 sets - 10 reps  - Seated Scapular Retraction  - 1 x daily - 7 x weekly - 1 sets - 10 reps  - Gentle Levator Scapulae Stretch  - 1 x daily - 7 x weekly - 1 sets - 5 reps - 10 sec hold    Overall Progression Towards Functional goals/ Treatment Progress Update:  [] Patient is progressing as expected towards functional goals listed. [x] Progression is slowed due to complexities/Impairments listed. [] Progression has been slowed due to co-morbidities. [] Plan just implemented, too soon to assess goals progression <30days   [] Goals require adjustment due to lack of progress  [] Patient is not progressing as expected and requires additional follow up with physician  [] Other    Prognosis for POC: [x] Good [] Fair  [] Poor      Patient requires continued skilled intervention: [x] Yes  [] No    Treatment/Activity Tolerance:  [x] Patient able to complete treatment  [] Patient limited by fatigue  [] Patient limited by pain    [] Patient limited by other medical complications  [] Other:       PLAN: See eval  [x] Continue per plan of care [] Alter current plan (see comments above)  [] Plan of care initiated [] Hold pending MD visit [] Discharge    Electronically signed by:  Lorenzo Thompson PT    Note: If patient does not return for scheduled/ recommended follow up visits, this note will serve as a discharge from care along with most recent update on progress.

## 2023-11-02 ENCOUNTER — OFFICE VISIT (OUTPATIENT)
Age: 38
End: 2023-11-02
Payer: MEDICAID

## 2023-11-02 DIAGNOSIS — F41.9 ANXIETY: Primary | ICD-10-CM

## 2023-11-02 PROCEDURE — 90832 PSYTX W PT 30 MINUTES: CPT | Performed by: PSYCHOLOGIST

## 2023-11-02 PROCEDURE — 1036F TOBACCO NON-USER: CPT | Performed by: PSYCHOLOGIST

## 2023-11-02 NOTE — PROGRESS NOTES
Behavioral Health Consultation   Rahel Owens, PhD  Clinical Psychologist  11/2/2023      Time spent with Patient: 30 minutes 2:35 -3:05  This is patient's 8 th  [facility] appointment. Reason for Consult:   Chief Complaint   Patient presents with    Anxiety    Stress       Referring Provider: Sima Escudero MD    Subjective  PT Improvement Questions  How is (target problem) going for you? Same, better, or worse? better    What specifically has changed (if anything)? Fewer intense anxiety episodes - self awareness and understanding are helping  , stressors have been reduced. Fibro flare ups persist, working to manage them in the night. Kyung Hussein family came to visit. Fibro pain is much reduced with PT. Working to pace herself    Has anyone else noticed any change(s)? If so, what? N/a    What do you think is causing the change or keeping it from getting worse? Continues with breathing strategy, noting internal state, gaining awareness of internal feeling states . Focusing on relationship with her sister, and her mother who can be challenging. Plan Implementation Questions    Recommendations to Patient:   1. Continue with current coping strategies   2. Talk to Josey Gomes about how to structure time with family when they visit     What part were you able to do? All of it  Did it help/what were the results? Yes      FUNCTIONAL ANALYSIS (if applicable)     Risk Assessment: Patient is judged to be at  low risk for harm to self/others due to  no current thoughts/reports of suicide and homicide. Objective  Level of distress: Low  Orientation: Person, Place, Time, and Situation  Appearance: Well-groomed, Awake, Alert, and Good eye contact  Other: mood is euthymic      11/2/23 PROMIS-10 Scores:    PROMIS Raw Score   WNL Symptoms /Impairment     Mild Moderate Severe   Global Physical Health 15-20 13-14 9-12 4-8   Global Mental Health 14-20 11-13 7-10 4-6       Assessment and Plan  Anxiety.  Pt reports improvement in

## 2023-11-06 ENCOUNTER — HOSPITAL ENCOUNTER (OUTPATIENT)
Dept: PHYSICAL THERAPY | Age: 38
Setting detail: THERAPIES SERIES
Discharge: HOME | End: 2023-11-06
Payer: MEDICAID

## 2023-11-06 PROCEDURE — 97110 THERAPEUTIC EXERCISES: CPT

## 2023-11-06 PROCEDURE — 97140 MANUAL THERAPY 1/> REGIONS: CPT

## 2023-11-06 NOTE — FLOWSHEET NOTE
functional mobility as indicated by patients Functional Deficits. [x] Progressing: [] Met: [] Not Met: [] Adjusted  4. Patient will return to modified functional activities without increased symptoms or restriction. [x] Progressing: [] Met: [] Not Met: [] Adjusted  5. Pt will exhibit self posture correction in clinic. (patient specific functional goal)    [x] Progressing: [] Met: [] Not Met: [] Adjusted          Access Code: [email or web address]  URL: [email or web address]  Date: 07/05/2023  Prepared by: Arik Mo    Exercises  - Supine Scapular Protraction in Flexion with Dumbbells  - 1 x daily - 7 x weekly - 1 sets - 10 reps  - Supine Cervical Retraction with Towel  - 1 x daily - 7 x weekly - 1 sets - 10 reps  - Seated Scapular Retraction  - 1 x daily - 7 x weekly - 1 sets - 10 reps  - Gentle Levator Scapulae Stretch  - 1 x daily - 7 x weekly - 1 sets - 5 reps - 10 sec hold    Overall Progression Towards Functional goals/ Treatment Progress Update:  [] Patient is progressing as expected towards functional goals listed. [x] Progression is slowed due to complexities/Impairments listed. [] Progression has been slowed due to co-morbidities.   [] Plan just implemented, too soon to assess goals progression <30days   [] Goals require adjustment due to lack of progress  [] Patient is not progressing as expected and requires additional follow up with physician  [] Other    Prognosis for POC: [x] Good [] Fair  [] Poor      Patient requires continued skilled intervention: [x] Yes  [] No    Treatment/Activity Tolerance:  [x] Patient able to complete treatment  [] Patient limited by fatigue  [] Patient limited by pain    [] Patient limited by other medical complications  [] Other:       PLAN: See eval  [x] Continue per plan of care [] Alter current plan (see comments above)  [] Plan of care initiated [] Hold pending MD visit [] Discharge    Electronically signed by:  Dandre Abreu PT    Note: If patient does not

## 2023-11-08 ENCOUNTER — HOSPITAL ENCOUNTER (OUTPATIENT)
Dept: PHYSICAL THERAPY | Age: 38
Setting detail: THERAPIES SERIES
Discharge: HOME | End: 2023-11-08
Payer: MEDICAID

## 2023-11-08 PROCEDURE — 97110 THERAPEUTIC EXERCISES: CPT

## 2023-11-08 PROCEDURE — 97140 MANUAL THERAPY 1/> REGIONS: CPT

## 2023-11-08 NOTE — PROGRESS NOTES
overall improving symptoms and feels more mobile and less flare ups. Pt reported the lowest pain level to date. Pt will continue to benefit from ? strength and stability as tolerated and continue to incorporate dry needling. Access Code: [email or web address]  URL: [email or web address]  Date: 07/10/2023  Prepared by: Parviz Decker    Exercises  - Supine Scapular Protraction in Flexion with Dumbbells  - 1 x daily - 7 x weekly - 1 sets - 10 reps  - Supine Cervical Retraction with Towel  - 1 x daily - 7 x weekly - 1 sets - 10 reps  - Seated Scapular Retraction  - 1 x daily - 7 x weekly - 1 sets - 10 reps  - Gentle Levator Scapulae Stretch  - 1 x daily - 7 x weekly - 1 sets - 5 reps - 10 sec hold    GOALS:     Patient stated goal: ? pain     Therapist goals for Patient:   Short Term Goals: To be achieved in: 2 weeks  1. Independent in HEP and progression per patient tolerance, in order to prevent re-injury. [] Progressing: [x] Met: [] Not Met: [] Adjusted  2. Patient will have a decrease in pain to facilitate improvement in movement, function, and ADLs as indicated by Functional Deficits. [] Progressing: [x] Met: [] Not Met: [] Adjusted     Long Term Goals: To be achieved in: 12 weeks  1. Disability index score of 20% or less for the NDI to assist with reaching prior level of function. [x] Progressing: [] Met: [] Not Met: [] Adjusted  2. Patient will demonstrate increased AROM to [facility] of cervical/thoracic spine to allow for proper joint functioning as indicated by patients Functional Deficits. [] Progressing: [x] Met: [] Not Met: [] Adjusted  3. Patient will demonstrate an increase in postural awareness and control and activation of  Deep cervical stabilizers to allow for proper functional mobility as indicated by patients Functional Deficits. [] Progressing: [x] Met: [] Not Met: [] Adjusted  4. Patient will return to modified functional activities without increased symptoms or restriction.    [x] Progressing:

## 2023-11-13 ENCOUNTER — HOSPITAL ENCOUNTER (OUTPATIENT)
Dept: PHYSICAL THERAPY | Age: 38
Setting detail: THERAPIES SERIES
Discharge: HOME | End: 2023-11-13
Payer: MEDICAID

## 2023-11-13 PROCEDURE — 97140 MANUAL THERAPY 1/> REGIONS: CPT

## 2023-11-13 PROCEDURE — 97110 THERAPEUTIC EXERCISES: CPT

## 2023-11-13 NOTE — FLOWSHEET NOTE
2-3\" 10  x   Sidelying open book 2 5 Added 7/10 x   LS S 10\" 5  x   SA punch 1 15 0# x   Chin tuck + lift 1 10 VC, ^10/18 x   Bilat ER 2 10 RTB, added 7/10 x   Added 7/17, on post x   RTB, added 7/31 x   Stretching to L side x   Shoulder extension 1 10 + 5 ^11/1, GTB x               Manual Intervention (01119)                bilat scap mobs, thoracic UPA MWM, gentle STM thoracic paraspinals x10'  Pt reports a (+) response with this treatment reporting improved tolerance to ex                      NMR re-education (72426)   CUES NEEDED                                                                   Therapeutic Activity (40662)                                          [employer] access code: [email or web address]   ADD TO CODE        Therapeutic Exercise and NMR EXR  [x] (76425) Provided verbal/tactile cueing for activities related to strengthening, flexibility, endurance, ROM  for improvements in scapular, scapulothoracic and UE control with self care, reaching, carrying, lifting, house/yardwork, driving/computer work.    [] (84148) Provided verbal/tactile cueing for activities related to improving balance, coordination, kinesthetic sense, posture, motor skill, proprioception  to assist with  scapular, scapulothoracic and UE control with self care, reaching, carrying, lifting, house/yardwork, driving/computer work. Therapeutic Activities:    [] (74736 or 06554) Provided verbal/tactile cueing for activities related to improving balance, coordination, kinesthetic sense, posture, motor skill, proprioception and motor activation to allow for proper function of scapular, scapulothoracic and UE control with self care, carrying, lifting, driving/computer work.      Home Exercise Program:    [x] (98944) Reviewed/Progressed HEP activities related to strengthening, flexibility, endurance, ROM of scapular, scapulothoracic and UE control with self care, reaching, carrying, lifting, house/yardwork, driving/computer work  [] (66211)

## 2023-11-15 ENCOUNTER — HOSPITAL ENCOUNTER (OUTPATIENT)
Dept: PHYSICAL THERAPY | Age: 38
Setting detail: THERAPIES SERIES
Discharge: HOME | End: 2023-11-15
Payer: MEDICAID

## 2023-11-15 PROCEDURE — 97140 MANUAL THERAPY 1/> REGIONS: CPT

## 2023-11-15 PROCEDURE — 20561 NDL INSJ W/O NJX 3+ MUSC: CPT

## 2023-11-15 PROCEDURE — 97110 THERAPEUTIC EXERCISES: CPT

## 2023-11-20 ENCOUNTER — HOSPITAL ENCOUNTER (OUTPATIENT)
Dept: PHYSICAL THERAPY | Age: 38
Setting detail: THERAPIES SERIES
Discharge: HOME | End: 2023-11-20
Payer: MEDICAID

## 2023-11-20 PROCEDURE — 97110 THERAPEUTIC EXERCISES: CPT

## 2023-11-20 PROCEDURE — 97140 MANUAL THERAPY 1/> REGIONS: CPT

## 2023-11-22 ENCOUNTER — HOSPITAL ENCOUNTER (OUTPATIENT)
Dept: PHYSICAL THERAPY | Age: 38
Setting detail: THERAPIES SERIES
Discharge: HOME | End: 2023-11-22
Payer: MEDICAID

## 2023-11-22 PROCEDURE — 97110 THERAPEUTIC EXERCISES: CPT

## 2023-11-22 PROCEDURE — 20561 NDL INSJ W/O NJX 3+ MUSC: CPT

## 2023-11-22 PROCEDURE — 97140 MANUAL THERAPY 1/> REGIONS: CPT

## 2023-11-22 NOTE — FLOWSHEET NOTE
work.    [] (77588) Provided verbal/tactile cueing for activities related to improving balance, coordination, kinesthetic sense, posture, motor skill, proprioception  to assist with  scapular, scapulothoracic and UE control with self care, reaching, carrying, lifting, house/yardwork, driving/computer work. Therapeutic Activities:    [] (41038 or 78058) Provided verbal/tactile cueing for activities related to improving balance, coordination, kinesthetic sense, posture, motor skill, proprioception and motor activation to allow for proper function of scapular, scapulothoracic and UE control with self care, carrying, lifting, driving/computer work. Home Exercise Program:    [x] (64924) Reviewed/Progressed HEP activities related to strengthening, flexibility, endurance, ROM of scapular, scapulothoracic and UE control with self care, reaching, carrying, lifting, house/yardwork, driving/computer work  [] (55935) Reviewed/Progressed HEP activities related to improving balance, coordination, kinesthetic sense, posture, motor skill, proprioception of scapular, scapulothoracic and UE control with self care, reaching, carrying, lifting, house/yardwork, driving/computer work      Manual Treatments:  PROM / STM / Oscillations-Mobs:  G-I, II, III, IV (PA's, Inf., Post.)  [x] (19853) Provided manual therapy to mobilize soft tissue/joints of cervical/CT, scapular GHJ and UE for the purpose of modulating pain, promoting relaxation,  increasing ROM, reducing/eliminating soft tissue swelling/inflammation/restriction, improving soft tissue extensibility and allowing for proper ROM for normal function with self care, reaching, carrying, lifting, house/yardwork, driving/computer work    Modalities:   [] GAME READY (VASO)- for significant edema, swelling, pain control.      Charges:  Timed Code Treatment Minutes: 40   Total Treatment Minutes:  40      [] EVAL (LOW) 88297 (typically 20 minutes face-to-face)  [] EVAL (MOD) 73054

## 2023-11-27 ENCOUNTER — HOSPITAL ENCOUNTER (OUTPATIENT)
Dept: PHYSICAL THERAPY | Age: 38
Setting detail: THERAPIES SERIES
Discharge: HOME | End: 2023-11-27
Payer: MEDICAID

## 2023-11-27 PROCEDURE — 97110 THERAPEUTIC EXERCISES: CPT

## 2023-11-27 PROCEDURE — 97140 MANUAL THERAPY 1/> REGIONS: CPT

## 2023-11-29 ENCOUNTER — HOSPITAL ENCOUNTER (OUTPATIENT)
Dept: PHYSICAL THERAPY | Age: 38
Setting detail: THERAPIES SERIES
Discharge: HOME | End: 2023-11-29
Payer: MEDICAID

## 2023-11-29 PROCEDURE — 97110 THERAPEUTIC EXERCISES: CPT

## 2023-11-29 PROCEDURE — 97140 MANUAL THERAPY 1/> REGIONS: CPT

## 2023-11-29 PROCEDURE — 20561 NDL INSJ W/O NJX 3+ MUSC: CPT

## 2023-12-05 ENCOUNTER — OFFICE VISIT (OUTPATIENT)
Dept: ORTHOPEDIC SURGERY | Age: 38
End: 2023-12-05
Payer: MEDICAID

## 2023-12-05 VITALS — BODY MASS INDEX: 28.27 KG/M2 | WEIGHT: 144 LBS | HEIGHT: 60 IN

## 2023-12-05 DIAGNOSIS — G89.29 CHRONIC MIDLINE LOW BACK PAIN, UNSPECIFIED WHETHER SCIATICA PRESENT: ICD-10-CM

## 2023-12-05 DIAGNOSIS — M54.50 CHRONIC MIDLINE LOW BACK PAIN, UNSPECIFIED WHETHER SCIATICA PRESENT: ICD-10-CM

## 2023-12-05 DIAGNOSIS — M41.9 SCOLIOSIS OF THORACIC SPINE, UNSPECIFIED SCOLIOSIS TYPE: ICD-10-CM

## 2023-12-05 DIAGNOSIS — M54.12 CERVICAL RADICULITIS: ICD-10-CM

## 2023-12-05 DIAGNOSIS — G89.29 CHRONIC NECK PAIN: Primary | ICD-10-CM

## 2023-12-05 DIAGNOSIS — M79.18 MYOFASCIAL PAIN: ICD-10-CM

## 2023-12-05 DIAGNOSIS — M54.2 CHRONIC NECK PAIN: Primary | ICD-10-CM

## 2023-12-05 PROCEDURE — G8484 FLU IMMUNIZE NO ADMIN: HCPCS | Performed by: PHYSICIAN ASSISTANT

## 2023-12-05 PROCEDURE — 1036F TOBACCO NON-USER: CPT | Performed by: PHYSICIAN ASSISTANT

## 2023-12-05 PROCEDURE — G8419 CALC BMI OUT NRM PARAM NOF/U: HCPCS | Performed by: PHYSICIAN ASSISTANT

## 2023-12-05 PROCEDURE — G8427 DOCREV CUR MEDS BY ELIG CLIN: HCPCS | Performed by: PHYSICIAN ASSISTANT

## 2023-12-05 PROCEDURE — 99214 OFFICE O/P EST MOD 30 MIN: CPT | Performed by: PHYSICIAN ASSISTANT

## 2023-12-05 NOTE — PROGRESS NOTES
FOLLOW UP: SPINE    12/5/2023     CHIEF COMPLAINT:    Chief Complaint   Patient presents with    Follow-up     CERVICAL & THORACIC       HISTORY OF PRESENT ILLNESS:              The patient is a 42 y.o. female history of depression, fibromyalgia here to follow-up after physical therapy and pharmacologic care for chronic worsening neck and back pain. She reports chronic underlying right-sided neck/trap pain radiating to her right upper extremity--typically triceps forearm to the last 3 digits. Periodically she will experience some numbness and tingling in her right upper extremity. She feels symptoms have been worsening over the last few years. Her pain is constant but increased with sitting, standing, walking or resting. Denies any true palliative factors aside from meloxicam.  Conservative care includes extensive PT, gabapentin, Elavil, ibuprofen, Mobic. Very intermittently she will experience some tingling in her hands and feet. She reports subjective arm weakness. My request for cervical MRI scan was denied by her insurance company. She denies any clear-cut lower extremity radiating pain. She denies any progressive extremity weakness. She denies any fine motor difficulty. She denies any recent fevers chills or infections. No recent injury or trauma. The pain assessment was noted & reviewed in the medical record today. Current/Past Treatment:   Physical Therapy: Yes since October 2023 to present.  PT notes reviewed  Chiropractic:     Injection:     Medications:            NSAIDS: Ibuprofen, Mobic with some benefit            Muscle relaxer:              Steriods:              Neuropathic medications: Gabapentin            Opioids: Elavil            Other: Topical from her rheumatologist  Surgery/Consult: no    Work Status/Functionality: N/A    Past Medical History: Medical history form was reviewed today & scanned into the media tab  Past Medical History:   Diagnosis Date    Depression

## 2023-12-06 ENCOUNTER — HOSPITAL ENCOUNTER (OUTPATIENT)
Dept: PHYSICAL THERAPY | Age: 38
Setting detail: THERAPIES SERIES
Discharge: HOME | End: 2023-12-06
Payer: MEDICAID

## 2023-12-06 DIAGNOSIS — G89.29 CHRONIC BILATERAL LOW BACK PAIN, UNSPECIFIED WHETHER SCIATICA PRESENT: Primary | ICD-10-CM

## 2023-12-06 DIAGNOSIS — M54.50 CHRONIC BILATERAL LOW BACK PAIN, UNSPECIFIED WHETHER SCIATICA PRESENT: Primary | ICD-10-CM

## 2023-12-06 PROCEDURE — 97161 PT EVAL LOW COMPLEX 20 MIN: CPT

## 2023-12-06 PROCEDURE — 97110 THERAPEUTIC EXERCISES: CPT

## 2023-12-06 NOTE — FLOWSHEET NOTE
{Location:[facility]}      Physical Therapy: TREATMENT/PROGRESS NOTE   Patient: Jammie Joy (11 y.o. female)   Treatment Date: 2023   :  1985 MRN: [id number]   Visit #: Visit count could not be calculated. Make sure you are using a visit which is associated with an episode. Insurance Allowable Auth Needed   *** []Yes    []No    Insurance: Payor: [employer] / Plan: [employer] [facility] PLAN [place] / Product Type: *No Product type* /   Insurance ID: [id number] - (Medicaid Managed)  Secondary Insurance (if applicable):    Treatment Diagnosis: ***    ICD-10-CM    1.  Chronic bilateral low back pain, unspecified whether sciatica present  M54.50     G89.29          Medical Diagnosis:    Chronic thoracic back pain, unspecified back pain laterality [M54.6, G89.29]  Scoliosis of thoracic spine, unspecified scoliosis type [M41.9]   Referring Physician: Ekta Vigil  PCP: Brittnee Murray MD                             Plan of care signed (Y/N):     Date of Patient follow up with Physician:      Progress Report/POC: {Progress:09034::\"EVAL today\"}  POC update due: (10 visits /OR [address], whichever is less) *** 2024     *** (Cut and paste Precautions/Contraindications from Eval)    Preferred Language for Healthcare:   [x]English       []other:    SUBJECTIVE EXAMINATION     Patient Report/Comments: see eval     Test used Initial score  2023   Pain Summary VAS ***    Functional questionnaire {outcome measures:53904} ***    Other:                OBJECTIVE EXAMINATION     Observation:     Test measurements: see eval    Exercises/Interventions:     Therapeutic Ex (54137)  resistance Sets/time Reps Notes/Cues/Progressions                                                                         Manual Intervention ([id number])  TIME                                        NMR re-education (78520) resistance Sets/time Reps CUES NEEDED

## 2023-12-11 DIAGNOSIS — F41.9 ANXIETY: ICD-10-CM

## 2023-12-11 RX ORDER — HYDROXYZINE HYDROCHLORIDE 25 MG/1
25 TABLET, FILM COATED ORAL EVERY 8 HOURS PRN
Qty: 30 TABLET | Refills: 0 | Status: SHIPPED | OUTPATIENT
Start: 2023-12-11 | End: 2023-12-21

## 2023-12-11 NOTE — TELEPHONE ENCOUNTER
Medication:   Requested Prescriptions     Pending Prescriptions Disp Refills    hydrOXYzine HCl (ATARAX) 25 MG tablet 30 tablet 0     Sig: Take 1 tablet by mouth every 8 hours as needed for Itching or Anxiety        Last Filled:      Patient Phone Number: [phone] (home)     Last appt: 10/6/2023   Next appt: 1/5/2024    Last OARRS:        No data to display

## 2023-12-11 NOTE — TELEPHONE ENCOUNTER
----- Message from Saranya Gtz sent at 12/11/2023  9:20 AM EST -----  Subject: Refill Request    QUESTIONS  Name of Medication? hydrOXYzine HCl (ATARAX) 25 MG tablet  Patient-reported dosage and instructions? two tablets daily, or every 8   hours  How many days do you have left? 7  Preferred Pharmacy? [address] phone number (if available)? [phone]  ---------------------------------------------------------------------------  --------------  Nicole Amin INFO  What is the best way for the office to contact you? OK to leave message on   voicemail,OK to respond with electronic message via [employer] portal (only   for patients who have registered [employer] account)  Preferred Call Back Phone Number? [phone]  ---------------------------------------------------------------------------  --------------  SCRIPT ANSWERS  Relationship to Patient?  Self

## 2023-12-12 LAB
ALBUMIN SERPL-MCNC: 4.7 GM/DL (ref 3.5–5.2)
ALP BLD-CCNC: 60 U/L (ref 36–123)
ALT SERPL-CCNC: 30 U/L
ANION GAP SERPL CALCULATED.3IONS-SCNC: 9 MMOL/L (ref 7–16)
AST SERPL-CCNC: 20 U/L
BASOPHILS # BLD: 0.9 %
BASOPHILS ABSOLUTE: 0.1 X10(3)/MCL (ref 0–0.1)
BILIRUB SERPL-MCNC: 0.4 MG/DL (ref 0.2–1.3)
BUN BLDV-MCNC: 11 MG/DL (ref 6–20)
CALCIUM SERPL-MCNC: 9.4 MG/DL (ref 8.6–10.4)
CHLORIDE BLD-SCNC: 101 MEQ/L (ref 98–107)
CO2: 27 MMOL/L (ref 22–29)
CREAT SERPL-MCNC: 0.87 MG/DL (ref 0.51–1.3)
EGFR (CKD-EPI): 86 ML/MIN/1.73 M2
EOSINOPHIL # BLD: 3.7 %
EOSINOPHILS ABSOLUTE: 0.3 X10(3)/MCL (ref 0–0.5)
GLUCOSE BLD-MCNC: 88 MG/DL (ref 74–100)
HCT VFR BLD CALC: 37.7 % (ref 34–45)
HEMOGLOBIN: 12.2 G/DL (ref 11.2–15.7)
IMMATURE CELLS ABSOLUTE COUNT: 0 X10(3)/MCL (ref 0–0.1)
IMMATURE GRANULOCYTES: 0.3 %
LYMPHOCYTES # BLD: 24.6 %
LYMPHOCYTES ABSOLUTE: 1.7 X10(3)/MCL (ref 1.2–3.9)
MCH RBC QN AUTO: 31 PG (ref 26–34)
MCHC RBC AUTO-ENTMCNC: 32.4 G/DL (ref 30.7–35.5)
MCV RBC AUTO: 95.7 FL (ref 80–100)
MONOCYTES # BLD: 7.1 %
MONOCYTES ABSOLUTE: 0.5 X10(3)/MCL (ref 0.3–0.9)
NEUTROPHILS ABSOLUTE: 4.3 X10(3)/MCL (ref 1.6–6.1)
NEUTROPHILS: 63.4 %
PDW BLD-RTO: 12 %
PLATELET # BLD: 263 X10(3)/MCL (ref 155–369)
PMV BLD AUTO: 10.6 FL (ref 8.8–12.5)
POTASSIUM SERPL-SCNC: 4.1 MEQ/L (ref 3.5–5)
RBC # BLD: 3.94 X10(6)/MCL (ref 3.9–5.2)
SODIUM BLD-SCNC: 137 MEQ/L (ref 136–145)
TOTAL PROTEIN: 7.1 GM/DL (ref 6.4–8.3)
WBC # BLD: 6.8 X10(3)/MCL (ref 3.7–10.3)

## 2023-12-13 ENCOUNTER — HOSPITAL ENCOUNTER (OUTPATIENT)
Dept: PHYSICAL THERAPY | Age: 38
Setting detail: THERAPIES SERIES
Discharge: HOME | End: 2023-12-13
Payer: MEDICAID

## 2023-12-13 NOTE — FLOWSHEET NOTE
Physical Therapy  Cancellation/No-show Note  Patient Name:  Landy Khoury  :  1985   Date:  2023  Cancelled visits to date: 4  No-shows to date: 0    For today's appointment patient:  [x]  Cancelled  []  Rescheduled appointment  []  No-show     Reason given by patient:  []  Patient ill  []  Conflicting appointment  []  No transportation    []  Conflict with work  []  No reason given  [x]  Other:  insurance reasons, don't have authorization   Comments:      Electronically signed by:  Raji Tronocso PT, PT

## 2023-12-14 ENCOUNTER — OFFICE VISIT (OUTPATIENT)
Age: 38
End: 2023-12-14

## 2023-12-14 DIAGNOSIS — F41.9 ANXIETY: Primary | ICD-10-CM

## 2023-12-14 ASSESSMENT — PROMIS GLOBAL HEALTH SCALE
IN THE PAST 7 DAYS, HOW WOULD YOU RATE YOUR FATIGUE ON AVERAGE [ON A SCALE FROM 1 (NONE) TO 5 (VERY SEVERE)]?: 3
IN GENERAL, HOW WOULD YOU RATE YOUR SATISFACTION WITH YOUR SOCIAL ACTIVITIES AND RELATIONSHIPS [ON A SCALE OF 1 (POOR) TO 5 (EXCELLENT)]?: 2
IN GENERAL, WOULD YOU SAY YOUR HEALTH IS...[ON A SCALE OF 1 (POOR) TO 5 (EXCELLENT)]: 3
TO WHAT EXTENT ARE YOU ABLE TO CARRY OUT YOUR EVERYDAY PHYSICAL ACTIVITIES SUCH AS WALKING, CLIMBING STAIRS, CARRYING GROCERIES, OR MOVING A CHAIR [ON A SCALE OF 1 (NOT AT ALL) TO 5 (COMPLETELY)]?: 3
SUM OF RESPONSES TO QUESTIONS 2, 4, 5, & 10: 10
IN GENERAL, PLEASE RATE HOW WELL YOU CARRY OUT YOUR USUAL SOCIAL ACTIVITIES (INCLUDES ACTIVITIES AT HOME, AT WORK, AND IN YOUR COMMUNITY, AND RESPONSIBILITIES AS A PARENT, CHILD, SPOUSE, EMPLOYEE, FRIEND, ETC) [ON A SCALE OF 1 (POOR) TO 5 (EXCELLENT)]?: 3
IN THE PAST 7 DAYS, HOW WOULD YOU RATE YOUR PAIN ON AVERAGE [ON A SCALE FROM 0 (NO PAIN) TO 10 (WORST IMAGINABLE PAIN)]?: 3
SUM OF RESPONSES TO QUESTIONS 3, 6, 7, & 8: 12
IN GENERAL, HOW WOULD YOU RATE YOUR PHYSICAL HEALTH [ON A SCALE OF 1 (POOR) TO 5 (EXCELLENT)]?: 3
IN GENERAL, HOW WOULD YOU RATE YOUR MENTAL HEALTH, INCLUDING YOUR MOOD AND YOUR ABILITY TO THINK [ON A SCALE OF 1 (POOR) TO 5 (EXCELLENT)]?: 3
IN GENERAL, WOULD YOU SAY YOUR QUALITY OF LIFE IS...[ON A SCALE OF 1 (POOR) TO 5 (EXCELLENT)]: 3
IN THE PAST 7 DAYS, HOW OFTEN HAVE YOU BEEN BOTHERED BY EMOTIONAL PROBLEMS, SUCH AS FEELING ANXIOUS, DEPRESSED, OR IRRITABLE [ON A SCALE FROM 1 (NEVER) TO 5 (ALWAYS)]?: 2

## 2023-12-14 NOTE — PROGRESS NOTES
Score   WNL Symptoms /Impairment     Mild Moderate Severe   Global Physical Health 15-20 13-14 9-12 4-8   Global Mental Health 14-20 11-13 7-10 4-6       Assessment and Plan  Anxiety. Pt reports ongoing improvement in functioning. Fibromyalgia remains prominent, she is working to pace herself daily. She is utilizing the tools she has learned with [employer] [facility] [place] - tolerating difficult moments, breathing strategies, interacting differently with her thoughts, working to be kinder to herself as well. She is finding benefit with PT, chiropractic adjustments, advocating for herself in medical settings. Sleep issues remain prominent, as does physical tension. Visit spent discussing  recent events, pt is motivated to maintain and build up on gains made in functioning. Strengths: pt is motivated to continue to build upon gains made in functioning     Recommendations to Patient:   1. Continue with current coping strategies     Recommendations to PCP:   1. Reinforce recommendations to pt. Follow-up plan: to see in 6 weeks    Referrals: None    Pancho Moya, PhD     This note was generated completely or in part utilizing Dragon dictation speech recognition software. Occasionally, words are mistranscribed and despite editing, the text may contain inaccuracies due to incorrect word recognition.   If further clarification is needed please contact the office at [phone]

## 2023-12-18 ENCOUNTER — HOSPITAL ENCOUNTER (OUTPATIENT)
Dept: PHYSICAL THERAPY | Age: 38
Setting detail: THERAPIES SERIES
End: 2023-12-18
Payer: MEDICAID

## 2023-12-27 ENCOUNTER — TELEPHONE (OUTPATIENT)
Dept: ORTHOPEDIC SURGERY | Age: 38
End: 2023-12-27

## 2023-12-27 ENCOUNTER — HOSPITAL ENCOUNTER (OUTPATIENT)
Dept: PHYSICAL THERAPY | Age: 38
Setting detail: THERAPIES SERIES
Discharge: HOME | End: 2023-12-27
Payer: MEDICAID

## 2023-12-27 PROCEDURE — 97140 MANUAL THERAPY 1/> REGIONS: CPT

## 2023-12-27 PROCEDURE — 97112 NEUROMUSCULAR REEDUCATION: CPT

## 2023-12-27 PROCEDURE — 97110 THERAPEUTIC EXERCISES: CPT

## 2023-12-27 NOTE — FLOWSHEET NOTE
[facility],[address] [facility] [address] office: [phone] fax: [phone]      Physical Therapy: TREATMENT/PROGRESS NOTE   Patient: Pebbles Brewster (21 y.o. female)   Treatment Date: 2023   :  1985 MRN: [id number]   Visit #: 3   Insurance Allowable Auth Needed   BMN [x]Yes    []No    Insurance: Payor: [facility] PL / Plan: [address] [place] [address] [place] / Product Type: *No Product type* /   Insurance ID: [id number] - (Medicaid Managed)  Secondary Insurance (if applicable):    Treatment Diagnosis:     ICD-10-CM    1. Chronic bilateral low back pain, unspecified whether sciatica present  M54.50     G89.29          Medical Diagnosis:    Chronic thoracic back pain, unspecified back pain laterality [M54.6, G89.29]  Scoliosis of thoracic spine, unspecified scoliosis type [M41.9]   Referring Physician: Lurline Ganser  PCP: Damien Hernandez MD                             Plan of care signed (Y/N):     Date of Patient follow up with Physician:      Progress Report/POC: NO  POC update due: (10 visits [address], whichever is less)  2024     Precautions/ Contra-indications:                                                                                          Latex allergy:  NO  Pacemaker:    NO  Contraindications for Manipulation: None  Date of Surgery: n/a  Other:     Preferred Language for Healthcare:   [x]English       []other:    SUBJECTIVE EXAMINATION     Patient Report/Comments: Pt reports she feels about the same as last visit.       Test used Initial score  2023   Pain Summary VAS 4    Functional questionnaire Modified [employer]    Other:                OBJECTIVE EXAMINATION     Observation:     Test measurements: see eval    Exercises/Interventions:     Therapeutic Ex (20045) / NMR re-education (88650) resistance Sets/time Reps Notes/Cues/Progressions   LTR  1 10 VC,

## 2023-12-27 NOTE — TELEPHONE ENCOUNTER
Left a voicemail for Jaad Terrazas regarding MRI CERVICAL SPINE WO CONTRAST approval and authorization being valid until 2/4/2024. Patient was instructed that their MRI needs to be scheduled at  [facility] . The patient was instructed to contact the facility to schedule  at [phone]. Patient was instructed to schedule follow-up appointment once the scan has been completed to go over the rest results in the office.

## 2024-01-03 ENCOUNTER — HOSPITAL ENCOUNTER (OUTPATIENT)
Dept: PHYSICAL THERAPY | Age: 39
Setting detail: THERAPIES SERIES
Discharge: HOME | End: 2024-01-03
Payer: MEDICAID

## 2024-01-03 PROCEDURE — 97140 MANUAL THERAPY 1/> REGIONS: CPT

## 2024-01-03 PROCEDURE — 97110 THERAPEUTIC EXERCISES: CPT

## 2024-01-03 NOTE — FLOWSHEET NOTE
Therapeutic Ex (21284) / NMR re-education (67276) resistance Sets/time Reps Notes/Cues/Progressions   LTR  1 10 VC, TC   Sciatic nn glide  4 5 VC, TC   Seated fig 4 S  10\" 5 VC   Prone glute squeeze  10\" 10 Added 12/20, challenge, impaired activation of R compared to L   HS S  10\" 5  Added 12/27   Standing hip ABD  2 10 Bilat, added 1/3                               Manual Intervention (54239)  TIME     STM bilat lumbar paraspinal, rolling R glute  x7'     Sacral distraction  x2'                          NMR re-education (54267) resistance Sets/time Reps CUES NEEDED                                      Therapeutic Activity (12233)  Sets/time                                          Modalities:    No modalities applied this session    Education/Home Exercise Program: Patient HEP program created electronically.  Refer to MDconnectME access code: [email or web address]      ASSESSMENT     Today's Assessment: During therapy this date, patient required visual cueing, verbal cueing, and tactile cueing for exercise progression and improving proper muscle recruitment and activation/motor control patterns.Patient will continue to benefit from ongoing evaluation and advanced clinical decision from a Physical Therapist to address and improve pain control, muscle strength, and neuromuscular control to safely return to ADLs/IADLs without symptoms or restrictions.    Medical Necessity Documentation:  I certify that this patient meets the below criteria necessary for medical necessity for care and/or justification of therapy services:  The patient has functional impairments and/or activity limitations and would benefit from continued outpatient therapy services to address the deficits outlined in the patients goals    Treatment/Activity Tolerance:  [x] Patient tolerated treatment well [] Patient limited by fatique  [] Patient limited by pain  [] Patient limited by other medical complications  [] Other:     Return to Play: NA    Prognosis for

## 2024-01-05 ENCOUNTER — OFFICE VISIT (OUTPATIENT)
Dept: FAMILY MEDICINE CLINIC | Age: 39
End: 2024-01-05
Payer: MEDICAID

## 2024-01-05 VITALS
WEIGHT: 146 LBS | SYSTOLIC BLOOD PRESSURE: 122 MMHG | RESPIRATION RATE: 16 BRPM | OXYGEN SATURATION: 97 % | TEMPERATURE: 97.9 F | BODY MASS INDEX: 28.66 KG/M2 | HEART RATE: 97 BPM | DIASTOLIC BLOOD PRESSURE: 64 MMHG | HEIGHT: 60 IN

## 2024-01-05 DIAGNOSIS — F51.04 PSYCHOPHYSIOLOGICAL INSOMNIA: Primary | ICD-10-CM

## 2024-01-05 DIAGNOSIS — M79.7 FIBROMYALGIA: ICD-10-CM

## 2024-01-05 PROCEDURE — 3074F SYST BP LT 130 MM HG: CPT | Performed by: FAMILY MEDICINE

## 2024-01-05 PROCEDURE — 3078F DIAST BP <80 MM HG: CPT | Performed by: FAMILY MEDICINE

## 2024-01-05 PROCEDURE — G8427 DOCREV CUR MEDS BY ELIG CLIN: HCPCS | Performed by: FAMILY MEDICINE

## 2024-01-05 PROCEDURE — G8484 FLU IMMUNIZE NO ADMIN: HCPCS | Performed by: FAMILY MEDICINE

## 2024-01-05 PROCEDURE — 99213 OFFICE O/P EST LOW 20 MIN: CPT | Performed by: FAMILY MEDICINE

## 2024-01-05 PROCEDURE — 1036F TOBACCO NON-USER: CPT | Performed by: FAMILY MEDICINE

## 2024-01-05 PROCEDURE — G8419 CALC BMI OUT NRM PARAM NOF/U: HCPCS | Performed by: FAMILY MEDICINE

## 2024-01-05 RX ORDER — GABAPENTIN 100 MG/1
100 CAPSULE ORAL 3 TIMES DAILY
COMMUNITY

## 2024-01-05 RX ORDER — AMITRIPTYLINE HYDROCHLORIDE 10 MG/1
20 TABLET, FILM COATED ORAL DAILY
Qty: 60 TABLET | Refills: 0 | COMMUNITY
Start: 2024-01-05

## 2024-01-05 ASSESSMENT — PATIENT HEALTH QUESTIONNAIRE - PHQ9
2. FEELING DOWN, DEPRESSED OR HOPELESS: 0
SUM OF ALL RESPONSES TO PHQ QUESTIONS 1-9: 0
SUM OF ALL RESPONSES TO PHQ QUESTIONS 1-9: 0
SUM OF ALL RESPONSES TO PHQ9 QUESTIONS 1 & 2: 0
SUM OF ALL RESPONSES TO PHQ QUESTIONS 1-9: 0
SUM OF ALL RESPONSES TO PHQ QUESTIONS 1-9: 0
1. LITTLE INTEREST OR PLEASURE IN DOING THINGS: 0

## 2024-01-05 NOTE — PROGRESS NOTES
Subjective:      Patient ID: Kenia Vieyra is a 38 y.o. female.    Other  This is a chronic (fibromyalgia) problem. The current episode started more than 1 year ago. The problem occurs constantly. The problem has been waxing and waning. Nothing aggravates the symptoms. Treatments tried: gabapentin, elavil ,physical therapy, psychotherapy. The treatment provided moderate relief.     Insomnia  Onset of sx:  months   Sx described as:unable to fall asleep   Patient denies: use of stimulants   Symptoms have been: unchanged   Medications tried: none ,   Life style changes: yes         Review of Systems    Objective:  Blood pressure 122/64, pulse 97, temperature 97.9 °F (36.6 °C), temperature source Tympanic, resp. rate 16, height 1.524 m (5'), weight 66.2 kg (146 lb), last menstrual period 12/22/2023, SpO2 97 %, not currently breastfeeding.     Physical Exam  Vitals and nursing note reviewed.   Constitutional:       General: She is not in acute distress.     Appearance: Normal appearance. She is well-developed. She is not ill-appearing, toxic-appearing or diaphoretic.   HENT:      Head: Normocephalic.   Eyes:      General: No scleral icterus.     Conjunctiva/sclera: Conjunctivae normal.      Pupils: Pupils are equal, round, and reactive to light.   Cardiovascular:      Rate and Rhythm: Normal rate and regular rhythm.      Heart sounds: No murmur heard.  Pulmonary:      Effort: Pulmonary effort is normal. No respiratory distress.      Breath sounds: Normal breath sounds. No wheezing.   Musculoskeletal:         General: Normal range of motion.      Cervical back: Normal range of motion.   Skin:     General: Skin is warm.      Findings: No erythema.   Neurological:      Mental Status: She is alert and oriented to person, place, and time.      Cranial Nerves: No cranial nerve deficit.      Coordination: Coordination normal.   Psychiatric:         Behavior: Behavior normal.         Thought Content: Thought content normal.

## 2024-01-05 NOTE — PROGRESS NOTES
Subjective:      Patient ID: Kenia Vieyra is a 38 y.o. female.    HPI    Review of Systems    Objective:  Blood pressure 122/64, pulse 97, temperature 97.9 °F (36.6 °C), temperature source Tympanic, resp. rate 16, height 1.524 m (5'), weight 66.2 kg (146 lb), last menstrual period 12/22/2023, SpO2 97 %, not currently breastfeeding.     Physical Exam          Zoey Starr MD

## 2024-01-08 ENCOUNTER — OFFICE VISIT (OUTPATIENT)
Age: 39
End: 2024-01-08
Payer: MEDICAID

## 2024-01-08 ENCOUNTER — HOSPITAL ENCOUNTER (OUTPATIENT)
Dept: PHYSICAL THERAPY | Age: 39
Setting detail: THERAPIES SERIES
Discharge: HOME | End: 2024-01-08
Payer: MEDICAID

## 2024-01-08 VITALS
HEART RATE: 98 BPM | RESPIRATION RATE: 16 BRPM | BODY MASS INDEX: 28.8 KG/M2 | HEIGHT: 60 IN | DIASTOLIC BLOOD PRESSURE: 76 MMHG | SYSTOLIC BLOOD PRESSURE: 128 MMHG | WEIGHT: 146.7 LBS | OXYGEN SATURATION: 95 % | TEMPERATURE: 97.9 F

## 2024-01-08 DIAGNOSIS — M79.7 FIBROMYALGIA: ICD-10-CM

## 2024-01-08 DIAGNOSIS — G47.30 SLEEP-DISORDERED BREATHING: Primary | ICD-10-CM

## 2024-01-08 DIAGNOSIS — F41.1 GENERALIZED ANXIETY DISORDER WITH PANIC ATTACKS: ICD-10-CM

## 2024-01-08 DIAGNOSIS — F41.0 GENERALIZED ANXIETY DISORDER WITH PANIC ATTACKS: ICD-10-CM

## 2024-01-08 DIAGNOSIS — F33.1 MDD (MAJOR DEPRESSIVE DISORDER), RECURRENT EPISODE, MODERATE (HCC): ICD-10-CM

## 2024-01-08 PROBLEM — F51.04 PSYCHOPHYSIOLOGICAL INSOMNIA: Status: ACTIVE | Noted: 2024-01-08

## 2024-01-08 PROCEDURE — 97110 THERAPEUTIC EXERCISES: CPT

## 2024-01-08 PROCEDURE — G8419 CALC BMI OUT NRM PARAM NOF/U: HCPCS | Performed by: FAMILY MEDICINE

## 2024-01-08 PROCEDURE — 99204 OFFICE O/P NEW MOD 45 MIN: CPT | Performed by: FAMILY MEDICINE

## 2024-01-08 PROCEDURE — 3074F SYST BP LT 130 MM HG: CPT | Performed by: FAMILY MEDICINE

## 2024-01-08 PROCEDURE — G8427 DOCREV CUR MEDS BY ELIG CLIN: HCPCS | Performed by: FAMILY MEDICINE

## 2024-01-08 PROCEDURE — 1036F TOBACCO NON-USER: CPT | Performed by: FAMILY MEDICINE

## 2024-01-08 PROCEDURE — 3078F DIAST BP <80 MM HG: CPT | Performed by: FAMILY MEDICINE

## 2024-01-08 PROCEDURE — 97140 MANUAL THERAPY 1/> REGIONS: CPT

## 2024-01-08 PROCEDURE — G8484 FLU IMMUNIZE NO ADMIN: HCPCS | Performed by: FAMILY MEDICINE

## 2024-01-08 ASSESSMENT — ANXIETY QUESTIONNAIRES
IF YOU CHECKED OFF ANY PROBLEMS ON THIS QUESTIONNAIRE, HOW DIFFICULT HAVE THESE PROBLEMS MADE IT FOR YOU TO DO YOUR WORK, TAKE CARE OF THINGS AT HOME, OR GET ALONG WITH OTHER PEOPLE: SOMEWHAT DIFFICULT
7. FEELING AFRAID AS IF SOMETHING AWFUL MIGHT HAPPEN: 0
1. FEELING NERVOUS, ANXIOUS, OR ON EDGE: 3
4. TROUBLE RELAXING: 3
2. NOT BEING ABLE TO STOP OR CONTROL WORRYING: 0
3. WORRYING TOO MUCH ABOUT DIFFERENT THINGS: 0
GAD7 TOTAL SCORE: 9
5. BEING SO RESTLESS THAT IT IS HARD TO SIT STILL: 3
6. BECOMING EASILY ANNOYED OR IRRITABLE: 0

## 2024-01-08 ASSESSMENT — PATIENT HEALTH QUESTIONNAIRE - PHQ9
SUM OF ALL RESPONSES TO PHQ QUESTIONS 1-9: 12
SUM OF ALL RESPONSES TO PHQ9 QUESTIONS 1 & 2: 2
7. TROUBLE CONCENTRATING ON THINGS, SUCH AS READING THE NEWSPAPER OR WATCHING TELEVISION: 0
5. POOR APPETITE OR OVEREATING: 1
9. THOUGHTS THAT YOU WOULD BE BETTER OFF DEAD, OR OF HURTING YOURSELF: 0
2. FEELING DOWN, DEPRESSED OR HOPELESS: 1
6. FEELING BAD ABOUT YOURSELF - OR THAT YOU ARE A FAILURE OR HAVE LET YOURSELF OR YOUR FAMILY DOWN: 0
3. TROUBLE FALLING OR STAYING ASLEEP: 3
SUM OF ALL RESPONSES TO PHQ QUESTIONS 1-9: 12
4. FEELING TIRED OR HAVING LITTLE ENERGY: 3
1. LITTLE INTEREST OR PLEASURE IN DOING THINGS: 1
SUM OF ALL RESPONSES TO PHQ QUESTIONS 1-9: 12
10. IF YOU CHECKED OFF ANY PROBLEMS, HOW DIFFICULT HAVE THESE PROBLEMS MADE IT FOR YOU TO DO YOUR WORK, TAKE CARE OF THINGS AT HOME, OR GET ALONG WITH OTHER PEOPLE: 1
8. MOVING OR SPEAKING SO SLOWLY THAT OTHER PEOPLE COULD HAVE NOTICED. OR THE OPPOSITE, BEING SO FIGETY OR RESTLESS THAT YOU HAVE BEEN MOVING AROUND A LOT MORE THAN USUAL: 3
SUM OF ALL RESPONSES TO PHQ QUESTIONS 1-9: 12

## 2024-01-08 NOTE — PROGRESS NOTES
B, PT [facility] PT [facility]   1/17/2024  2:20 PM Kena Gonzalez B, PT [facility] PT [facility]   1/25/2024  2:30 PM Cinthia Aguiar, PhD [facility] ED PSY [facility]       Data Reviewed:  I reviewed documentation from PCP. I reviewed all relevant labs and imaging.      Radha Pryor MD  For referring PCP, thank you for the referral, please feel free to Mychart with any questions or concerns.    HPI:     Kenia Vieyra is a 38 y.o. Other female with a history of fibromyalgia and anxiety who presents to outpatient primary care psychiatry on 1/8/2024 for psychiatric evaluation with chief complaint of anxiety and sleep.    Reports hx of chronic pain and related anxiety since she was a young child. States that she struggles with sleep and concentration due to pain and anxiety. Reports hyperalgesia most days.   Takes hydroxyzine for anxiety/panic- reports  having panic attacks, worse at night, feels like her heart is racing and will take it. Has been so severe that she has called 911.  Pt feels like she needs more help with sleep.  Feels fatigued and exhausted at night, can fall asleep but reports nighttime awakening after about 2-4 hours she wakes up. Sometimes feels like her airways close off, like she is choking in her sleep, wakes up panicked and in pain. Also reports she grinds teeth. Has not seen a dentist in some time but notices that teeth are chipped and ground down. Denies any environmental triggers for nighttime awakenings. Will stay in bed for an hour and if she can't fall back asleep will get up. Wakes up not feeling refreshed, reports sig daytime fatigue, notes, \"I feel like I am always on low gas.\"  States that these have been chronic issues with sleep since she was young.  Feels like if she could get more restorative sleep this would improve control of her symptoms.   Feels like some of her issues are related to hormones- notes fibromyalgia flares are worst when she is on her period and then persist  Has been

## 2024-01-08 NOTE — PATIENT INSTRUCTIONS
Increase amitriptyline to 20mg consistently every night until you see Rheumatology, then discuss dose increase to 30mg.    Please call to schedule with sleep medicine:  [facility] - Sleep Medicine - Antoine Saha MD  [address], [address]   [place] [address]  Phone: [phone]    Foundations for Good Sleep:    1. Have a routine for getting ready for bed that is the same each night (brush teeth, wash face, etc).    2. Make sure your bedroom is quiet and dark. If noise is a problem, use a fan to mask the noise or use ear plugs. If you must sleep during the day, hang dark blinds over the windows or wear an eye mask.   3. No food, cigarettes/tobacco/nicotine, caffeine, exercise or screen time (TV, laptop, ipad, phone) within 2 hrs of going to bed.  4. Set a consistent bed time.   5. Get in bed. If not asleep within 30 min, get out of bed and try a non-stimulating activity (read a magazine/book, cross-word, puzzle, etc) for 30 minutes or until you feel tired, then go back again and repeat if necessary. Do not stay in bed thinking/worrying (see below), and do not watch TV! Remember, the bed is for sleep and intimacy ONLY.   7. Wake up at a consistent time every day and make sure you get out of bed. Do not spend more than 15 min awake in bed in the morning, do not snooze, and do not sleep in.  8. Do not nap during the day. This decreases your biological drive to sleep at night. If you must take a nap, only rest for 30 minutes and don’t nap after 3:00 p.m.   9. Remember that after 3 nights of \"bad\" sleep, most people will have a good night's sleep. This applies to you as well, and brings hope for that good night that WILL come.  10. Remember that re-training your body to a new sleep schedule will take time and patience.      Tips for addressing worries at bedtime:  Try a relaxation technique:  Deep breathing: focus on your breathing by taking slow, deep breaths while counting to 5. Then listen to

## 2024-01-08 NOTE — FLOWSHEET NOTE
[facility]- Outpatient Rehabilitation and Therapy [address], [place] [address] office: [phone] fax: [phone]      Physical Therapy: TREATMENT/PROGRESS NOTE   Patient: Kenia Vieyra (38 y.o. female)   Treatment Date: 2024   :  1985 MRN: [id number]   Visit #: Visit count could not be calculated. Make sure you are using a visit which is associated with an episode.   Insurance Allowable Auth Needed   BMN [x]Yes    []No    Insurance: Payor: [employer] / Plan: [employer] [place]AN [place] / Product Type: *No Product type* /   Insurance ID: [id number] - (Medicaid Managed)  Secondary Insurance (if applicable):    Treatment Diagnosis:     ICD-10-CM    1. Chronic bilateral low back pain, unspecified whether sciatica present  M54.50     G89.29          Medical Diagnosis:    Chronic thoracic back pain, unspecified back pain laterality [M54.6, G89.29]  Scoliosis of thoracic spine, unspecified scoliosis type [M41.9]   Referring Physician: Skyla Bran,*  PCP: Zoey Starr MD                             Plan of care signed (Y/N): Y    Date of Patient follow up with Physician: prn     Progress Report/POC: NO  POC update due: (10 visits /OR AUTH LIMITS, whichever is less)  2024     Precautions/ Contra-indications:                                                                                          Latex allergy:  NO  Pacemaker:    NO  Contraindications for Manipulation: None  Date of Surgery: n/a  Other:     Preferred Language for Healthcare:   [x]English       []other:    SUBJECTIVE EXAMINATION     Patient Report/Comments: Pt reports she feels some improvement overall.       Test used Initial score  2024   Pain Summary VAS 4 2   Functional questionnaire Modified Oswestry NV    Other:                OBJECTIVE EXAMINATION     Observation:     Test measurements: see eval    Exercises/Interventions:

## 2024-01-10 ENCOUNTER — HOSPITAL ENCOUNTER (OUTPATIENT)
Dept: PHYSICAL THERAPY | Age: 39
Setting detail: THERAPIES SERIES
Discharge: HOME | End: 2024-01-10
Payer: MEDICAID

## 2024-01-10 ENCOUNTER — HOSPITAL ENCOUNTER (OUTPATIENT)
Dept: MRI IMAGING | Age: 39
Discharge: HOME | End: 2024-01-10
Payer: MEDICAID

## 2024-01-10 DIAGNOSIS — M54.2 CHRONIC NECK PAIN: ICD-10-CM

## 2024-01-10 DIAGNOSIS — M79.18 MYOFASCIAL PAIN: ICD-10-CM

## 2024-01-10 DIAGNOSIS — M54.12 CERVICAL RADICULITIS: ICD-10-CM

## 2024-01-10 DIAGNOSIS — M41.9 SCOLIOSIS OF THORACIC SPINE, UNSPECIFIED SCOLIOSIS TYPE: ICD-10-CM

## 2024-01-10 DIAGNOSIS — G89.29 CHRONIC NECK PAIN: ICD-10-CM

## 2024-01-10 PROCEDURE — 97140 MANUAL THERAPY 1/> REGIONS: CPT

## 2024-01-10 PROCEDURE — 72141 MRI NECK SPINE W/O DYE: CPT

## 2024-01-10 PROCEDURE — 97110 THERAPEUTIC EXERCISES: CPT

## 2024-01-10 NOTE — FLOWSHEET NOTE
[facility]- Outpatient Rehabilitation and Therapy [address], [place] [address] office: [phone] fax: [phone]      Physical Therapy: TREATMENT/PROGRESS NOTE   Patient: Kenia Vieyra (38 y.o. female)   Treatment Date: 01/10/2024   :  1985 MRN: [id number]   Visit #: 6   Insurance Allowable Auth Needed   16 visits ( - 3/5) [x]Yes    []No    Insurance: Payor: [email or web address] / Plan: [email or web address] PLAN [place] / Product Type: *No Product type* /   Insurance ID: [id number] - (Medicaid Managed)  Secondary Insurance (if applicable):    Treatment Diagnosis:     ICD-10-CM    1. Chronic bilateral low back pain, unspecified whether sciatica present  M54.50     G89.29          Medical Diagnosis:    Chronic thoracic back pain, unspecified back pain laterality [M54.6, G89.29]  Scoliosis of thoracic spine, unspecified scoliosis type [M41.9]   Referring Physician: Skyla Bran,*  PCP: Zoey Starr MD                             Plan of care signed (Y/N): Y    Date of Patient follow up with Physician: prn     Progress Report/POC: NO  POC update due: (10 visits /OR AUTH LIMITS, whichever is less)  2024 NEXT VISIT    Precautions/ Contra-indications:                                                                                          Latex allergy:  NO  Pacemaker:    NO  Contraindications for Manipulation: None  Date of Surgery: n/a  Other:     Preferred Language for Healthcare:   [x]English       []other:    SUBJECTIVE EXAMINATION     Patient Report/Comments: Pt reports ? pain into RLE today, possibly due to rain as well as sitting at computer x3 hours.      Test used Initial score  12/6/23 01/10/2024   Pain Summary VAS 4 4-5/10   Functional questionnaire Modified Oswestry NV    Other:                OBJECTIVE EXAMINATION     Observation:     Test measurements: see eval    Exercises/Interventions:     Therapeutic Ex (57425) / NMR

## 2024-01-11 ENCOUNTER — TELEPHONE (OUTPATIENT)
Dept: ORTHOPEDIC SURGERY | Age: 39
End: 2024-01-11

## 2024-01-11 NOTE — TELEPHONE ENCOUNTER
Contacted the patient regarding her MRI TR. Patient was informed we have those results and would like to schedule the patient for a follow up appointment. She may call the mainline to schedule. The phone number was provided.

## 2024-01-15 ENCOUNTER — HOSPITAL ENCOUNTER (OUTPATIENT)
Dept: PHYSICAL THERAPY | Age: 39
Setting detail: THERAPIES SERIES
Discharge: HOME | End: 2024-01-15
Payer: MEDICAID

## 2024-01-15 PROCEDURE — 97140 MANUAL THERAPY 1/> REGIONS: CPT

## 2024-01-15 PROCEDURE — 97110 THERAPEUTIC EXERCISES: CPT

## 2024-01-15 NOTE — FLOWSHEET NOTE
[facility]- Outpatient Rehabilitation and Therapy [address], [place] [address] office: [phone] fax: [phone]      Physical Therapy: TREATMENT/PROGRESS NOTE   Patient: Kenia Vieyra (38 y.o. female)   Treatment Date: 01/15/2024   :  1985 MRN: [id number]   Visit #: 6   Insurance Allowable Auth Needed   16 visits ( - 3/5) [x]Yes    []No    Insurance: Payor: [employer] / Plan: [employer] PLAN [place] / Product Type: *No Product type* /   Insurance ID: [id number] - (Medicaid Managed)  Secondary Insurance (if applicable):    Treatment Diagnosis:     ICD-10-CM    1. Chronic bilateral low back pain, unspecified whether sciatica present  M54.50     G89.29          Medical Diagnosis:    Chronic thoracic back pain, unspecified back pain laterality [M54.6, G89.29]  Scoliosis of thoracic spine, unspecified scoliosis type [M41.9]   Referring Physician: Skyla Bran,*  PCP: Zoey Starr MD                             Plan of care signed (Y/N): Y    Date of Patient follow up with Physician: prn     Progress Report/POC: NO  POC update due: (10 visits /OR AUTH LIMITS, whichever is less)  2024 NEXT VISIT    Precautions/ Contra-indications:                                                                                          Latex allergy:  NO  Pacemaker:    NO  Contraindications for Manipulation: None  Date of Surgery: n/a  Other:     Preferred Language for Healthcare:   [x]English       []other:    SUBJECTIVE EXAMINATION     Patient Report/Comments: Pt reports ? irritation in great toe today. She has been a little more flared up overall.      Test used Initial score  12/6/23 01/15/2024   Pain Summary VAS 4 4-5/10   Functional questionnaire Modified Oswestry NV    Other:                OBJECTIVE EXAMINATION     Observation:     Test measurements: see eval    Exercises/Interventions:     Therapeutic Ex (06498) / NMR

## 2024-01-16 ENCOUNTER — OFFICE VISIT (OUTPATIENT)
Dept: ORTHOPEDIC SURGERY | Age: 39
End: 2024-01-16
Payer: MEDICAID

## 2024-01-16 VITALS — WEIGHT: 146 LBS | HEIGHT: 60 IN | BODY MASS INDEX: 28.66 KG/M2

## 2024-01-16 DIAGNOSIS — M54.12 CERVICAL RADICULITIS: ICD-10-CM

## 2024-01-16 DIAGNOSIS — M50.20 PROTRUSION OF CERVICAL INTERVERTEBRAL DISC: ICD-10-CM

## 2024-01-16 DIAGNOSIS — G89.29 CHRONIC NECK PAIN: Primary | ICD-10-CM

## 2024-01-16 DIAGNOSIS — M79.18 MYOFASCIAL PAIN: ICD-10-CM

## 2024-01-16 DIAGNOSIS — M54.2 CHRONIC NECK PAIN: Primary | ICD-10-CM

## 2024-01-16 PROCEDURE — G8427 DOCREV CUR MEDS BY ELIG CLIN: HCPCS | Performed by: PHYSICIAN ASSISTANT

## 2024-01-16 PROCEDURE — G8484 FLU IMMUNIZE NO ADMIN: HCPCS | Performed by: PHYSICIAN ASSISTANT

## 2024-01-16 PROCEDURE — G8419 CALC BMI OUT NRM PARAM NOF/U: HCPCS | Performed by: PHYSICIAN ASSISTANT

## 2024-01-16 PROCEDURE — 1036F TOBACCO NON-USER: CPT | Performed by: PHYSICIAN ASSISTANT

## 2024-01-16 PROCEDURE — 99214 OFFICE O/P EST MOD 30 MIN: CPT | Performed by: PHYSICIAN ASSISTANT

## 2024-01-16 RX ORDER — HYDROXYZINE HYDROCHLORIDE 25 MG/1
25 TABLET, FILM COATED ORAL EVERY 8 HOURS PRN
COMMUNITY
Start: 2023-07-28

## 2024-01-16 NOTE — PROGRESS NOTES
FOLLOW UP: SPINE    1/16/2024     CHIEF COMPLAINT:    Chief Complaint   Patient presents with    Follow-up     MRI RESULTS CERVICAL SPINE         HISTORY OF PRESENT ILLNESS:              The patient is a 38 y.o. female history of depression, fibromyalgia here to follow-up to review cervical MRI for chronic worsening neck and back pain.  She reports chronic underlying right-sided neck/trap pain radiating to her right upper extremity--typically triceps forearm to the last 3 digits.  Periodically she will experience some numbness and tingling in her right upper extremity.  She feels symptoms have been worsening over the last few years.  Today she also reports that periodically she will have pain in her right occiput that will extend up into her scalp.  Her right-sided neck and trap pain are most bothersome.  Her pain is constant but increased with sitting, standing, walking or resting.  She does report some improvement with NSAIDs, PT and dry needling.  Conservative care includes extensive PT, gabapentin, Elavil, ibuprofen, Mobic, diclofenac.  She denies any progressive extremity weakness. She denies any clear-cut lower extremity radiating pain.  She denies any fine motor difficulty.  She denies any recent fevers chills or infections.  No recent injury or trauma.    The pain assessment was noted & reviewed in the medical record today.     Current/Past Treatment:   Physical Therapy: Yes since October 2023 to present. PT notes reviewed  Chiropractic:     Injection:     Medications:            NSAIDS: Ibuprofen, Mobic, diclofenac            Muscle relaxer:              Steriods:              Neuropathic medications: Gabapentin            Opioids: Elavil            Other: Topical from her rheumatologist  Surgery/Consult: no    Work Status/Functionality: N/A    Past Medical History: Medical history form was reviewed today & scanned into the media tab  Past Medical History:   Diagnosis Date    Depression

## 2024-01-17 ENCOUNTER — HOSPITAL ENCOUNTER (OUTPATIENT)
Dept: PHYSICAL THERAPY | Age: 39
Setting detail: THERAPIES SERIES
Discharge: HOME | End: 2024-01-17
Payer: MEDICAID

## 2024-01-17 PROCEDURE — 97110 THERAPEUTIC EXERCISES: CPT

## 2024-01-17 PROCEDURE — 97140 MANUAL THERAPY 1/> REGIONS: CPT

## 2024-01-17 NOTE — PLAN OF CARE
criteria necessary for medical necessity for care and/or justification of therapy services:  The patient has functional impairments and/or activity limitations and would benefit from continued outpatient therapy services to address the deficits outlined in the patients goals    Treatment/Activity Tolerance:  [x] Patient tolerated treatment well [] Patient limited by fatique  [] Patient limited by pain  [] Patient limited by other medical complications  [] Other:     Return to Play: NA    Prognosis for POC: [x] Good [] Fair  [] Poor    Patient requires continued skilled intervention: [x] Yes  [] No      GOALS     Patient stated goal: manage and control pain  Status: [x] Progressing: [] Met: [] Not Met: [] Adjusted     Therapist goals for Patient:   Short Term Goals: To be achieved in: 2 weeks  Independent in HEP and progression per patient tolerance, in order to progress toward full function and prevent re-injury.               Status: [] Progressing: [x] Met: [] Not Met: [] Adjusted  Patient will have a decrease in pain to 3/10 to help  facilitate improvement in movement, function, and ADLs as indicated by functional deficits.              Status: [] Progressing: [x] Met: [] Not Met: [] Adjusted     Long Term Goals: To be achieved in:  12  weeks  Disability index score of 15% or less for the Modified Oswestry to assist with return top prior level of function.                     Status: [x] Progressing: [] Met: [] Not Met: [] Adjusted  LLE AROM = RLE AROM to allow for proper joint functioning as indicated by patients functional deficits.  Status: [x] Progressing: [] Met: [] Not Met: [] Adjusted  Pt to improve strength to 4+/5 or better of proximal hip and posterior chain Magaly allow for proper muscle and joint use in functional mobility, ADLs and prior level of function  Status: [x] Progressing: [] Met: [] Not Met: [] Adjusted  Patient will return to Usual recreational activities, lifting an object from the floor,

## 2024-01-22 ENCOUNTER — PATIENT MESSAGE (OUTPATIENT)
Dept: FAMILY MEDICINE CLINIC | Age: 39
End: 2024-01-22

## 2024-01-22 ENCOUNTER — HOSPITAL ENCOUNTER (OUTPATIENT)
Dept: PHYSICAL THERAPY | Age: 39
Setting detail: THERAPIES SERIES
Discharge: HOME | End: 2024-01-22
Payer: MEDICAID

## 2024-01-22 PROCEDURE — 97110 THERAPEUTIC EXERCISES: CPT

## 2024-01-22 PROCEDURE — 97140 MANUAL THERAPY 1/> REGIONS: CPT

## 2024-01-22 NOTE — PLAN OF CARE
standing activities  (58466) HOME EXERCISE PROGRAM - Reviewed/Progressed HEP activities related to neuromuscular reeducation of movement, balance, coordination, kinesthetic sense, posture, and/or proprioception for sitting and/or standing activities    (51948) MANUAL THERAPY -  Manual therapy techniques, 1 or more regions, each 15 minutes (Mobilization/manipulation, manual lymphatic drainage, manual traction) for the purpose of modulating pain, promoting relaxation,  increasing ROM, reducing/eliminating soft tissue swelling/inflammation/restriction, improving soft tissue extensibility and allowing for proper ROM for normal function with self care, mobility, lifting and ambulation    TREATMENT PLAN   Plan: Cont POC- Continue emphasis/focus on exercise progression, improving proper muscle recruitment and activation/motor control patterns, modulating pain, and promoting relaxation. Next visit plan to add new exercises     Electronically Signed by Kena Elkins PT              Date: 01/22/2024     Note: If patient does not return for scheduled/recommended follow up visits, this note will serve as a discharge from care along with the most recent update on progress.

## 2024-01-22 NOTE — TELEPHONE ENCOUNTER
From: Keina Vieyra  To: Dr. Zoey Starr  Sent: 1/22/2024 4:52 PM EST  Subject: Hydroxyzine 25mg     Hello ,  My last refill was filled by Dr. Cardenas during a recent time you were out of office.  I currently continue a routine taking hydroxyzine for anxiety once to twice a day as it is still helpful for me. I'm almost due for a refill if possible. I have 16 pills left for about alittle over a week.     [facility] [address] as [facility] deliver promptly.  Thanks so much   Gene

## 2024-01-23 RX ORDER — HYDROXYZINE HYDROCHLORIDE 25 MG/1
25 TABLET, FILM COATED ORAL EVERY 8 HOURS PRN
Qty: 90 TABLET | Refills: 1 | Status: SHIPPED | OUTPATIENT
Start: 2024-01-23 | End: 2024-04-22

## 2024-01-24 ENCOUNTER — HOSPITAL ENCOUNTER (OUTPATIENT)
Dept: PHYSICAL THERAPY | Age: 39
Setting detail: THERAPIES SERIES
Discharge: HOME | End: 2024-01-24
Payer: MEDICAID

## 2024-01-24 PROCEDURE — 97140 MANUAL THERAPY 1/> REGIONS: CPT

## 2024-01-24 PROCEDURE — 97110 THERAPEUTIC EXERCISES: CPT

## 2024-01-24 NOTE — FLOWSHEET NOTE
[] Met: [] Not Met: [] Adjusted  Patient will perform normal ADLs without symptoms 50% of the time                                                                                                                  Status: [x] Progressing: [] Met: [] Not Met: []      Overall Progression Towards Functional goals/ Treatment Progress Update:  [x] Patient is progressing as expected towards functional goals listed.    [] Progression is slowed due to complexities/Impairments listed.  [] Progression has been slowed due to co-morbidities.  [] Plan just implemented, too soon (<30days) to assess goals progression   [] Goals require adjustment due to lack of progress  [] Patient is not progressing as expected and requires additional follow up with physician  [] Other:     CHARGE CAPTURE     PT CHARGE GRID   CPT Code (TIMED) minutes # CPT Code (UNTIMED) #     Therex (00841)  30 2  EVAL:LOW (70199 - Typically 20 minutes face-to-face) 1    Neuromusc. Re-ed (54325)    Re-Eval (10650)     Manual (40236) 15 1  Estim Unattended (84847)     Ther. Act (27462)    Mech. Traction (82377)     Gait (03054)    Dry Needle 1-2 muscle (20560)     Aquatic Therex (37495)    Dry Needle 3+ muscle (20561)     Iontophoresis (60025)    VASO (73599)     Ultrasound (03390)    Group Therapy (83648)     Estim Attended (21531)    Canalith Repositioning (60927)     Other:    Other:    Total Timed Code Tx Minutes 40 3       Total Treatment Minutes 45        Charge Justification:  (52050) THERAPEUTIC EXERCISE - Provided verbal/tactile cueing for activities related to strengthening, flexibility, endurance, ROM performed to prevent loss of range of motion, maintain or improve muscular strength or increase flexibility, following either an injury or surgery.   (52818) NEUROMUSCULAR RE-EDUCATION - Therapeutic procedure, 1 or more areas, each 15 minutes; neuromuscular reeducation of movement, balance, coordination, kinesthetic sense, posture, and/or proprioception for

## 2024-01-29 ENCOUNTER — HOSPITAL ENCOUNTER (OUTPATIENT)
Dept: PHYSICAL THERAPY | Age: 39
Setting detail: THERAPIES SERIES
Discharge: HOME | End: 2024-01-29
Payer: MEDICAID

## 2024-01-29 PROCEDURE — 97110 THERAPEUTIC EXERCISES: CPT

## 2024-01-29 PROCEDURE — 97140 MANUAL THERAPY 1/> REGIONS: CPT

## 2024-01-29 NOTE — FLOWSHEET NOTE
function.  Status: [x] Progressing: [] Met: [] Not Met: [] Adjusted  Patient will perform normal ADLs without symptoms 50% of the time                                                                                                                  Status: [x] Progressing: [] Met: [] Not Met: []      Overall Progression Towards Functional goals/ Treatment Progress Update:  [x] Patient is progressing as expected towards functional goals listed.    [] Progression is slowed due to complexities/Impairments listed.  [] Progression has been slowed due to co-morbidities.  [] Plan just implemented, too soon (<30days) to assess goals progression   [] Goals require adjustment due to lack of progress  [] Patient is not progressing as expected and requires additional follow up with physician  [] Other:     CHARGE CAPTURE     PT CHARGE GRID   CPT Code (TIMED) minutes # CPT Code (UNTIMED) #     Therex (78651)  30 2  EVAL:LOW (13318 - Typically 20 minutes face-to-face) 1    Neuromusc. Re-ed (46229)    Re-Eval (02624)     Manual (77437) 15 1  Estim Unattended (28652)     Ther. Act (19893)    Mech. Traction (63235)     Gait (54322)    Dry Needle 1-2 muscle (66116)     Aquatic Therex (69514)    Dry Needle 3+ muscle (30784)     Iontophoresis (90209)    VASO (65715)     Ultrasound (72698)    Group Therapy (45477)     Estim Attended (22090)    Canalith Repositioning (19976)     Other:    Other:    Total Timed Code Tx Minutes 40 3       Total Treatment Minutes 45        Charge Justification:  (68225) THERAPEUTIC EXERCISE - Provided verbal/tactile cueing for activities related to strengthening, flexibility, endurance, ROM performed to prevent loss of range of motion, maintain or improve muscular strength or increase flexibility, following either an injury or surgery.   (42916) NEUROMUSCULAR RE-EDUCATION - Therapeutic procedure, 1 or more areas, each 15 minutes; neuromuscular reeducation of movement, balance, coordination, kinesthetic

## 2024-01-31 ENCOUNTER — HOSPITAL ENCOUNTER (OUTPATIENT)
Dept: PHYSICAL THERAPY | Age: 39
Setting detail: THERAPIES SERIES
Discharge: HOME | End: 2024-01-31
Payer: MEDICAID

## 2024-01-31 PROCEDURE — 97140 MANUAL THERAPY 1/> REGIONS: CPT

## 2024-01-31 PROCEDURE — 97110 THERAPEUTIC EXERCISES: CPT

## 2024-01-31 NOTE — FLOWSHEET NOTE
[facility]- Outpatient Rehabilitation and Therapy [address], [place] [address] office: [phone] fax: [phone]      Physical Therapy: TREATMENT/PROGRESS NOTE   Patient: Kenia Vieyra (38 y.o. female)   Treatment Date: 2024   :  1985 MRN: [id number]   Visit #: 10  Insurance Allowable Auth Needed   16 visits ( - 3/5) [x]Yes    []No    Insurance: Payor: [employer] / Plan: [employer] [place] [place] / Product Type: *No Product type* /   Insurance ID: [id number] - (Medicaid Managed)  Secondary Insurance (if applicable):    Treatment Diagnosis:     ICD-10-CM    1. Chronic bilateral low back pain, unspecified whether sciatica present  M54.50     G89.29          Medical Diagnosis:    Chronic thoracic back pain, unspecified back pain laterality [M54.6, G89.29]  Scoliosis of thoracic spine, unspecified scoliosis type [M41.9]   Referring Physician: Skyla Bran,*  PCP: Zoey Starr MD                             Plan of care signed (Y/N): Y    Date of Patient follow up with Physician: prn     Progress Report/POC: NO   POC update due: (10 visits /OR AUTH LIMITS, whichever is less)  24    Precautions/ Contra-indications:                                                                                          Latex allergy:  NO  Pacemaker:    NO  Contraindications for Manipulation: None  Date of Surgery: n/a  Other:     Preferred Language for Healthcare:   [x]English       []other:    SUBJECTIVE EXAMINATION     Patient Report/Comments: Pt reports she feels ? pain today and is unsure why. Pt feels a lot of jolting pain and feels like a strong muscle tightness.      Test used Initial score  2024   Pain Summary VAS 4 5-6/10   Functional questionnaire Modified Oswestry  NV   Other:                OBJECTIVE EXAMINATION     Observation:     Test measurements: : lumbar flex: 55 ° p! At end range, ext 10 ° p!

## 2024-02-01 ENCOUNTER — OFFICE VISIT (OUTPATIENT)
Age: 39
End: 2024-02-01
Payer: MEDICAID

## 2024-02-01 DIAGNOSIS — F41.9 ANXIETY: Primary | ICD-10-CM

## 2024-02-01 PROCEDURE — 1036F TOBACCO NON-USER: CPT | Performed by: PSYCHOLOGIST

## 2024-02-01 PROCEDURE — 90832 PSYTX W PT 30 MINUTES: CPT | Performed by: PSYCHOLOGIST

## 2024-02-01 ASSESSMENT — PROMIS GLOBAL HEALTH SCALE
IN THE PAST 7 DAYS, HOW OFTEN HAVE YOU BEEN BOTHERED BY EMOTIONAL PROBLEMS, SUCH AS FEELING ANXIOUS, DEPRESSED, OR IRRITABLE [ON A SCALE FROM 1 (NEVER) TO 5 (ALWAYS)]?: 3
IN GENERAL, HOW WOULD YOU RATE YOUR PHYSICAL HEALTH [ON A SCALE OF 1 (POOR) TO 5 (EXCELLENT)]?: 3
SUM OF RESPONSES TO QUESTIONS 3, 6, 7, & 8: 14
SUM OF RESPONSES TO QUESTIONS 2, 4, 5, & 10: 11
IN GENERAL, HOW WOULD YOU RATE YOUR SATISFACTION WITH YOUR SOCIAL ACTIVITIES AND RELATIONSHIPS [ON A SCALE OF 1 (POOR) TO 5 (EXCELLENT)]?: 2
IN THE PAST 7 DAYS, HOW WOULD YOU RATE YOUR PAIN ON AVERAGE [ON A SCALE FROM 0 (NO PAIN) TO 10 (WORST IMAGINABLE PAIN)]?: 3
IN GENERAL, WOULD YOU SAY YOUR HEALTH IS...[ON A SCALE OF 1 (POOR) TO 5 (EXCELLENT)]: 3
IN GENERAL, WOULD YOU SAY YOUR QUALITY OF LIFE IS...[ON A SCALE OF 1 (POOR) TO 5 (EXCELLENT)]: 3
IN THE PAST 7 DAYS, HOW WOULD YOU RATE YOUR FATIGUE ON AVERAGE [ON A SCALE FROM 1 (NONE) TO 5 (VERY SEVERE)]?: 4
IN GENERAL, PLEASE RATE HOW WELL YOU CARRY OUT YOUR USUAL SOCIAL ACTIVITIES (INCLUDES ACTIVITIES AT HOME, AT WORK, AND IN YOUR COMMUNITY, AND RESPONSIBILITIES AS A PARENT, CHILD, SPOUSE, EMPLOYEE, FRIEND, ETC) [ON A SCALE OF 1 (POOR) TO 5 (EXCELLENT)]?: 2
IN GENERAL, HOW WOULD YOU RATE YOUR MENTAL HEALTH, INCLUDING YOUR MOOD AND YOUR ABILITY TO THINK [ON A SCALE OF 1 (POOR) TO 5 (EXCELLENT)]?: 3
TO WHAT EXTENT ARE YOU ABLE TO CARRY OUT YOUR EVERYDAY PHYSICAL ACTIVITIES SUCH AS WALKING, CLIMBING STAIRS, CARRYING GROCERIES, OR MOVING A CHAIR [ON A SCALE OF 1 (NOT AT ALL) TO 5 (COMPLETELY)]?: 4

## 2024-02-01 NOTE — PROGRESS NOTES
Behavioral Health Consultation   Cinthia Aguiar, PhD  Clinical Psychologist  2/1/2024      Time spent with Patient: 30 minutes 2:40 -3:10  This is patient's 10 th  [facility] appointment.    Reason for Consult:   Chief Complaint   Patient presents with    Anxiety    Stress       Referring Provider: Zoey Starr MD    Subjective  PT Improvement Questions  How is (target problem) going for you? Same, better, or worse? better    What specifically has changed (if anything)?  Had a pain flare, but it has calmed down. She continues with PT  , met with Dr. Pryor, increased medication    Has anyone else noticed any change(s)?  If so, what? N/a    What do you think is causing the change or keeping it from getting worse?  Finding benefit with [employer] pain management website , advocating for herself with PT, working to be in positive contact with her nuclear family      Plan Implementation Questions    Recommendations to Patient:   1. Continue with current coping strategies       What part were you able to do? All of it  Did it help/what were the results?  Yes      FUNCTIONAL ANALYSIS (if applicable)     Risk Assessment: Patient is judged to be at  low risk for harm to self/others due to  no current thoughts/reports of suicide and homicide.     Objective  Level of distress: Low  Orientation: Person, Place, Time, and Situation  Appearance: Well-groomed, Awake, Alert, and Good eye contact  Other: mood is euthymic        2/1/24 PROMIS-10 Scores: Physical: 14 Mental: 11    PROMIS Raw Score   WNL Symptoms /Impairment     Mild Moderate Severe   Global Physical Health 15-20 13-14 9-12 4-8   Global Mental Health 14-20 11-13 7-10 4-6       Assessment and Plan  Anxiety. Pt reports stability in functioning. She continues to implement a myriad of strategies to manage ongoing pain flares, anxiety and fatigue. Guided meditation, breathing, compassion and pacing continue to be of great benefit for her. She is also advocating for

## 2024-02-05 ENCOUNTER — HOSPITAL ENCOUNTER (OUTPATIENT)
Dept: PHYSICAL THERAPY | Age: 39
Setting detail: THERAPIES SERIES
Discharge: HOME | End: 2024-02-05
Payer: MEDICAID

## 2024-02-05 PROCEDURE — 97110 THERAPEUTIC EXERCISES: CPT

## 2024-02-05 PROCEDURE — 97140 MANUAL THERAPY 1/> REGIONS: CPT

## 2024-02-05 NOTE — FLOWSHEET NOTE
walk 1 mile, and stand for length of time without increased symptoms or restriction to work towards return to prior level of function.  Status: [x] Progressing: [] Met: [] Not Met: [] Adjusted  Patient will perform normal ADLs without symptoms 50% of the time                                                                                                                  Status: [x] Progressing: [] Met: [] Not Met: []      Overall Progression Towards Functional goals/ Treatment Progress Update:  [x] Patient is progressing as expected towards functional goals listed.    [] Progression is slowed due to complexities/Impairments listed.  [] Progression has been slowed due to co-morbidities.  [] Plan just implemented, too soon (<30days) to assess goals progression   [] Goals require adjustment due to lack of progress  [] Patient is not progressing as expected and requires additional follow up with physician  [] Other:     CHARGE CAPTURE     PT CHARGE GRID   CPT Code (TIMED) minutes # CPT Code (UNTIMED) #     Therex (59428)  30 2  EVAL:LOW (23470 - Typically 20 minutes face-to-face) 1    Neuromusc. Re-ed (05325)    Re-Eval (43021)     Manual (08702) 15 1  Estim Unattended (97217)     Ther. Act (04495)    Mech. Traction (59070)     Gait (13195)    Dry Needle 1-2 muscle (48002)     Aquatic Therex (72516)    Dry Needle 3+ muscle (20561)     Iontophoresis (36903)    VASO (43414)     Ultrasound (48933)    Group Therapy (97971)     Estim Attended (29302)    Canalith Repositioning (24279)     Other:    Other:    Total Timed Code Tx Minutes 40 3       Total Treatment Minutes 45        Charge Justification:  (55661) THERAPEUTIC EXERCISE - Provided verbal/tactile cueing for activities related to strengthening, flexibility, endurance, ROM performed to prevent loss of range of motion, maintain or improve muscular strength or increase flexibility, following either an injury or surgery.   (63857) NEUROMUSCULAR RE-EDUCATION - Therapeutic

## 2024-02-07 ENCOUNTER — HOSPITAL ENCOUNTER (OUTPATIENT)
Dept: PHYSICAL THERAPY | Age: 39
Setting detail: THERAPIES SERIES
Discharge: HOME | End: 2024-02-07
Payer: MEDICAID

## 2024-02-07 PROCEDURE — 97110 THERAPEUTIC EXERCISES: CPT

## 2024-02-07 PROCEDURE — 97140 MANUAL THERAPY 1/> REGIONS: CPT

## 2024-02-07 NOTE — FLOWSHEET NOTE
areas, each 15 minutes; neuromuscular reeducation of movement, balance, coordination, kinesthetic sense, posture, and/or proprioception for sitting and/or standing activities  (17171) HOME EXERCISE PROGRAM - Reviewed/Progressed HEP activities related to neuromuscular reeducation of movement, balance, coordination, kinesthetic sense, posture, and/or proprioception for sitting and/or standing activities    (07518) MANUAL THERAPY -  Manual therapy techniques, 1 or more regions, each 15 minutes (Mobilization/manipulation, manual lymphatic drainage, manual traction) for the purpose of modulating pain, promoting relaxation,  increasing ROM, reducing/eliminating soft tissue swelling/inflammation/restriction, improving soft tissue extensibility and allowing for proper ROM for normal function with self care, mobility, lifting and ambulation    TREATMENT PLAN   Plan: Cont POC- Continue emphasis/focus on exercise progression, improving proper muscle recruitment and activation/motor control patterns, modulating pain, and promoting relaxation. Next visit plan to add new exercises     Electronically Signed by Kena Elkins PT              Date: 02/07/2024     Note: If patient does not return for scheduled/recommended follow up visits, this note will serve as a discharge from care along with the most recent update on progress.

## 2024-02-12 ENCOUNTER — OFFICE VISIT (OUTPATIENT)
Age: 39
End: 2024-02-12
Payer: MEDICAID

## 2024-02-12 VITALS
WEIGHT: 152 LBS | HEIGHT: 60 IN | SYSTOLIC BLOOD PRESSURE: 132 MMHG | OXYGEN SATURATION: 99 % | HEART RATE: 97 BPM | DIASTOLIC BLOOD PRESSURE: 80 MMHG | TEMPERATURE: 97.7 F | BODY MASS INDEX: 29.84 KG/M2

## 2024-02-12 DIAGNOSIS — F41.1 GENERALIZED ANXIETY DISORDER WITH PANIC ATTACKS: Primary | ICD-10-CM

## 2024-02-12 DIAGNOSIS — F33.1 MDD (MAJOR DEPRESSIVE DISORDER), RECURRENT EPISODE, MODERATE (HCC): ICD-10-CM

## 2024-02-12 DIAGNOSIS — F41.0 GENERALIZED ANXIETY DISORDER WITH PANIC ATTACKS: Primary | ICD-10-CM

## 2024-02-12 DIAGNOSIS — M79.7 FIBROMYALGIA: ICD-10-CM

## 2024-02-12 DIAGNOSIS — G47.09 OTHER INSOMNIA: ICD-10-CM

## 2024-02-12 PROCEDURE — 3079F DIAST BP 80-89 MM HG: CPT | Performed by: FAMILY MEDICINE

## 2024-02-12 PROCEDURE — 99214 OFFICE O/P EST MOD 30 MIN: CPT | Performed by: FAMILY MEDICINE

## 2024-02-12 PROCEDURE — 3075F SYST BP GE 130 - 139MM HG: CPT | Performed by: FAMILY MEDICINE

## 2024-02-12 PROCEDURE — G8427 DOCREV CUR MEDS BY ELIG CLIN: HCPCS | Performed by: FAMILY MEDICINE

## 2024-02-12 PROCEDURE — 1036F TOBACCO NON-USER: CPT | Performed by: FAMILY MEDICINE

## 2024-02-12 PROCEDURE — G8419 CALC BMI OUT NRM PARAM NOF/U: HCPCS | Performed by: FAMILY MEDICINE

## 2024-02-12 PROCEDURE — G8484 FLU IMMUNIZE NO ADMIN: HCPCS | Performed by: FAMILY MEDICINE

## 2024-02-12 RX ORDER — AMITRIPTYLINE HYDROCHLORIDE 10 MG/1
TABLET, FILM COATED ORAL
Qty: 90 TABLET | Refills: 1 | Status: SHIPPED | OUTPATIENT
Start: 2024-02-12 | End: 2024-03-11 | Stop reason: SDUPTHER

## 2024-02-12 ASSESSMENT — PATIENT HEALTH QUESTIONNAIRE - PHQ9
SUM OF ALL RESPONSES TO PHQ QUESTIONS 1-9: 17
SUM OF ALL RESPONSES TO PHQ QUESTIONS 1-9: 17
5. POOR APPETITE OR OVEREATING: 1
1. LITTLE INTEREST OR PLEASURE IN DOING THINGS: 2
3. TROUBLE FALLING OR STAYING ASLEEP: 3
SUM OF ALL RESPONSES TO PHQ9 QUESTIONS 1 & 2: 3
6. FEELING BAD ABOUT YOURSELF - OR THAT YOU ARE A FAILURE OR HAVE LET YOURSELF OR YOUR FAMILY DOWN: 1
7. TROUBLE CONCENTRATING ON THINGS, SUCH AS READING THE NEWSPAPER OR WATCHING TELEVISION: 3
SUM OF ALL RESPONSES TO PHQ QUESTIONS 1-9: 17
9. THOUGHTS THAT YOU WOULD BE BETTER OFF DEAD, OR OF HURTING YOURSELF: 1
2. FEELING DOWN, DEPRESSED OR HOPELESS: 1
10. IF YOU CHECKED OFF ANY PROBLEMS, HOW DIFFICULT HAVE THESE PROBLEMS MADE IT FOR YOU TO DO YOUR WORK, TAKE CARE OF THINGS AT HOME, OR GET ALONG WITH OTHER PEOPLE: 2
SUM OF ALL RESPONSES TO PHQ QUESTIONS 1-9: 16
8. MOVING OR SPEAKING SO SLOWLY THAT OTHER PEOPLE COULD HAVE NOTICED. OR THE OPPOSITE, BEING SO FIGETY OR RESTLESS THAT YOU HAVE BEEN MOVING AROUND A LOT MORE THAN USUAL: 2
4. FEELING TIRED OR HAVING LITTLE ENERGY: 3

## 2024-02-12 ASSESSMENT — COLUMBIA-SUICIDE SEVERITY RATING SCALE - C-SSRS
1. WITHIN THE PAST MONTH, HAVE YOU WISHED YOU WERE DEAD OR WISHED YOU COULD GO TO SLEEP AND NOT WAKE UP?: NO
2. HAVE YOU ACTUALLY HAD ANY THOUGHTS OF KILLING YOURSELF?: NO
6. HAVE YOU EVER DONE ANYTHING, STARTED TO DO ANYTHING, OR PREPARED TO DO ANYTHING TO END YOUR LIFE?: NO

## 2024-02-12 ASSESSMENT — ANXIETY QUESTIONNAIRES
5. BEING SO RESTLESS THAT IT IS HARD TO SIT STILL: 1
IF YOU CHECKED OFF ANY PROBLEMS ON THIS QUESTIONNAIRE, HOW DIFFICULT HAVE THESE PROBLEMS MADE IT FOR YOU TO DO YOUR WORK, TAKE CARE OF THINGS AT HOME, OR GET ALONG WITH OTHER PEOPLE: VERY DIFFICULT
GAD7 TOTAL SCORE: 6
1. FEELING NERVOUS, ANXIOUS, OR ON EDGE: 1
4. TROUBLE RELAXING: 3
6. BECOMING EASILY ANNOYED OR IRRITABLE: 1
7. FEELING AFRAID AS IF SOMETHING AWFUL MIGHT HAPPEN: 0
2. NOT BEING ABLE TO STOP OR CONTROL WORRYING: 0
3. WORRYING TOO MUCH ABOUT DIFFERENT THINGS: 0
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
## 2024-02-12 NOTE — PATIENT INSTRUCTIONS
Increase amitriptyline to 10mg in the morning and 20mg at night    Continue hydroxyzine and gabapentin as is    Discuss sleep study with sleep medicine tomorrow

## 2024-02-13 ENCOUNTER — OFFICE VISIT (OUTPATIENT)
Dept: PULMONOLOGY | Age: 39
End: 2024-02-13
Payer: MEDICAID

## 2024-02-13 VITALS
SYSTOLIC BLOOD PRESSURE: 130 MMHG | WEIGHT: 143 LBS | OXYGEN SATURATION: 98 % | HEART RATE: 85 BPM | DIASTOLIC BLOOD PRESSURE: 91 MMHG | HEIGHT: 60 IN | BODY MASS INDEX: 28.07 KG/M2

## 2024-02-13 DIAGNOSIS — R53.83 FATIGUE, UNSPECIFIED TYPE: ICD-10-CM

## 2024-02-13 DIAGNOSIS — G47.10 HYPERSOMNIA: Primary | ICD-10-CM

## 2024-02-13 DIAGNOSIS — R06.89 GASPING FOR BREATH: ICD-10-CM

## 2024-02-13 PROCEDURE — 1036F TOBACCO NON-USER: CPT | Performed by: STUDENT IN AN ORGANIZED HEALTH CARE EDUCATION/TRAINING PROGRAM

## 2024-02-13 PROCEDURE — 3080F DIAST BP >= 90 MM HG: CPT | Performed by: STUDENT IN AN ORGANIZED HEALTH CARE EDUCATION/TRAINING PROGRAM

## 2024-02-13 PROCEDURE — 99204 OFFICE O/P NEW MOD 45 MIN: CPT | Performed by: STUDENT IN AN ORGANIZED HEALTH CARE EDUCATION/TRAINING PROGRAM

## 2024-02-13 PROCEDURE — G8427 DOCREV CUR MEDS BY ELIG CLIN: HCPCS | Performed by: STUDENT IN AN ORGANIZED HEALTH CARE EDUCATION/TRAINING PROGRAM

## 2024-02-13 PROCEDURE — G8484 FLU IMMUNIZE NO ADMIN: HCPCS | Performed by: STUDENT IN AN ORGANIZED HEALTH CARE EDUCATION/TRAINING PROGRAM

## 2024-02-13 PROCEDURE — G8419 CALC BMI OUT NRM PARAM NOF/U: HCPCS | Performed by: STUDENT IN AN ORGANIZED HEALTH CARE EDUCATION/TRAINING PROGRAM

## 2024-02-13 PROCEDURE — 3075F SYST BP GE 130 - 139MM HG: CPT | Performed by: STUDENT IN AN ORGANIZED HEALTH CARE EDUCATION/TRAINING PROGRAM

## 2024-02-13 NOTE — PROGRESS NOTES
I will order a home sleep test to further evaluate this.    We discussed treatment options and she is willing to consider PAP therapy.  If the study is positive for obstructive sleep apnea, we will therefore proceed with auto CPAP unless in lab PAP titration is indicated based on the sleep study.   She understands that untreated JACOB is associated with heart failure, atrial fibrillation, stroke, HTN, Alzheimer's and impaired glucose tolerance.    She should avoid respiratory suppressants as these can worsen sleep disordered breathing.    She should never drive if drowsy and should pull over at a safe place if she becomes drowsy while driving. A handout on drowsy driving tips was given to the patient.    No follow-ups on file. Will schedule patient for follow-up after sleep test.    Antoine Saha MD  Sleep Medicine  12:14 PM    This dictation was generated by voice recognition computer software.  Although all attempts are made to edit the dictation for accuracy, there may be errors in the transcription that are not intended.

## 2024-02-13 NOTE — PATIENT INSTRUCTIONS
Patient information on the evaluation procedure for sleep apnea:    1. You are going to have a portable sleep study:  This is a home sleep study that will be done to see if you have obstructive sleep apnea. You will have a flow monitor on your nose, belt on your chest and a oxygen/heart rate monitor on your finger. Please do not wear nail polish on day of the study as it will interfere with the oxygen reading probe that is placed on your finger during the study.   Once you receive the device, you will also receive instructions on how to put it on and turn it on.  After 2 nights you will return it.    2. The sleep office will call you with the results a week after the study.     If the study showed that you have obstructive sleep apnea you will be told of the next step in your care. Commonly the recommended treatment is CPAP Therapy. If you agree to this therapy and you receive your CPAP before your next appointment, please bring it with you to your first follow-up appointment.      Patients who have obstructive sleep apnea on the sleep study and have chosen to try CPAP:  A home equipment company will contact you to set you up with a CPAP machine and fit you for a mask.    Please bring your CPAP, the mask and all supplies including the electrical cord with you to all your first follow up appointments with the sleep physician    Please keep trying to use your CPAP until you have seen in the sleep office in follow up as a lot of the problems patients have with CPAP can be addressed and corrected by your sleep provider.    Sleep center contact information:  [facility]: [phone]  [facility]: [phone]             What are the risk factors for Obstructive Sleep Apnea (JACOB)?  Obesity  Snoring  Daytime sleepiness  Increasing age  Male gender  Taking sedating medications  Alcohol use  Hypertension  Stroke  Diabetes  Smoking     What is JACOB?  People with JACOB experience recurrent

## 2024-02-14 ENCOUNTER — TELEPHONE (OUTPATIENT)
Dept: SLEEP CENTER | Age: 39
End: 2024-02-14

## 2024-02-14 ENCOUNTER — HOSPITAL ENCOUNTER (OUTPATIENT)
Dept: PHYSICAL THERAPY | Age: 39
Setting detail: THERAPIES SERIES
Discharge: HOME | End: 2024-02-14
Payer: MEDICAID

## 2024-02-14 PROCEDURE — 97140 MANUAL THERAPY 1/> REGIONS: CPT

## 2024-02-14 PROCEDURE — 97110 THERAPEUTIC EXERCISES: CPT

## 2024-02-14 NOTE — TELEPHONE ENCOUNTER
Called to schedule an hst per Adrian     Left vm for the pt to return my call     [facility] insurance

## 2024-02-14 NOTE — FLOWSHEET NOTE
Met: [] Adjusted  Pt to improve strength to 4+/5 or better of proximal hip and posterior chain Magaly allow for proper muscle and joint use in functional mobility, ADLs and prior level of function  Status: [x] Progressing: [] Met: [] Not Met: [] Adjusted  Patient will return to Usual recreational activities, lifting an object from the floor, light home activities, walk 1 mile, and stand for length of time without increased symptoms or restriction to work towards return to prior level of function.  Status: [x] Progressing: [] Met: [] Not Met: [] Adjusted  Patient will perform normal ADLs without symptoms 50% of the time                                                                                                                  Status: [x] Progressing: [] Met: [] Not Met: []      Overall Progression Towards Functional goals/ Treatment Progress Update:  [] Patient is progressing as expected towards functional goals listed.    [x] Progression is slowed due to complexities/Impairments listed.  [] Progression has been slowed due to co-morbidities.  [] Plan just implemented, too soon (<30days) to assess goals progression   [] Goals require adjustment due to lack of progress  [] Patient is not progressing as expected and requires additional follow up with physician  [] Other:     CHARGE CAPTURE     PT CHARGE GRID   CPT Code (TIMED) minutes # CPT Code (UNTIMED) #     Therex (23150)  30 2  EVAL:LOW (17684 - Typically 20 minutes face-to-face)     Neuromusc. Re-ed (31180)    Re-Eval (34026)     Manual (63973) 15 1  Estim Unattended (43069)     Ther. Act (32549)    Mech. Traction (93524)     Gait (57364)    Dry Needle 1-2 muscle (68259)     Aquatic Therex (52370)    Dry Needle 3+ muscle (20561)     Iontophoresis (86868)    VASO (82683)     Ultrasound (88662)    Group Therapy (21158)     Estim Attended (87001)    Canalith Repositioning (63633)     Other:    Other:    Total Timed Code Tx Minutes 40 3       Total Treatment

## 2024-02-19 ENCOUNTER — HOSPITAL ENCOUNTER (OUTPATIENT)
Dept: PHYSICAL THERAPY | Age: 39
Setting detail: THERAPIES SERIES
Discharge: HOME | End: 2024-02-19
Payer: MEDICAID

## 2024-02-19 DIAGNOSIS — M54.2 NECK PAIN: Primary | ICD-10-CM

## 2024-02-19 DIAGNOSIS — G89.29 CHRONIC RIGHT-SIDED THORACIC BACK PAIN: ICD-10-CM

## 2024-02-19 DIAGNOSIS — M54.6 CHRONIC RIGHT-SIDED THORACIC BACK PAIN: ICD-10-CM

## 2024-02-19 PROCEDURE — 97161 PT EVAL LOW COMPLEX 20 MIN: CPT

## 2024-02-19 PROCEDURE — 97140 MANUAL THERAPY 1/> REGIONS: CPT

## 2024-02-19 PROCEDURE — 97110 THERAPEUTIC EXERCISES: CPT

## 2024-02-19 RX ORDER — HYDROXYZINE HYDROCHLORIDE 25 MG/1
25 TABLET, FILM COATED ORAL EVERY 8 HOURS PRN
Qty: 90 TABLET | Refills: 1 | Status: SHIPPED | OUTPATIENT
Start: 2024-02-19

## 2024-02-19 NOTE — PLAN OF CARE
activities related to strengthening, flexibility, endurance, ROM performed to prevent loss of range of motion, maintain or improve muscular strength or increase flexibility, following either an injury or surgery.   (93858) HOME EXERCISE PROGRAM - Reviewed/Progressed HEP activities related to strengthening, flexibility, endurance, ROM performed to prevent loss of range of motion, maintain or improve muscular strength or increase flexibility, following either an injury or surgery.  (68038) MANUAL THERAPY -  Manual therapy techniques, 1 or more regions, each 15 minutes (Mobilization/manipulation, manual lymphatic drainage, manual traction) for the purpose of modulating pain, promoting relaxation,  increasing ROM, reducing/eliminating soft tissue swelling/inflammation/restriction, improving soft tissue extensibility and allowing for proper ROM for normal function with self care, mobility, lifting and ambulation      GOALS     Patient stated goal: decrease pain, sleep better  Status:  [] Progressing: [] Met: [] Not Met: [] Adjusted    Therapist goals for Patient:   Short Term Goals: To be achieved in: 2 weeks  Independent in HEP and progression per patient tolerance, in order to progress toward full function and prevent re-injury.    Status: [] Progressing: [] Met: [] Not Met: [] Adjusted  Patient will have a decrease in pain to 2/10 to help facilitate improvement in movement, function, and ADLs as indicated by functional deficits.   Status: [] Progressing: [] Met: [] Not Met: [] Adjusted    Long Term Goals: To be achieved in:  12  weeks  Disability index score of 15% or less for the Neck Disability Index to assist with return top prior level of function.   Status: [] Progressing: [] Met: [] Not Met: [] Adjusted  Improve ROM to WNL to allow for proper joint functioning as indicated by patients functional deficits.  Status: [] Progressing: [] Met: [] Not Met: [] Adjusted  Pt to improve strength to WFL of deep cervical

## 2024-02-19 NOTE — TELEPHONE ENCOUNTER
Medication:   Requested Prescriptions     Pending Prescriptions Disp Refills    hydrOXYzine HCl (ATARAX) 25 MG tablet [Pharmacy Med Name: hydroxyzine HCl 25 mg tablet] 90 tablet 1     Sig: TAKE ONE TABLET BY MOUTH EVERY 8 HOURS AS NEEDED FOR ANXIETY        Last Filled:     01/23/2024        Patient Phone Number: [phone] (home)     Last appt: 1/5/2024   Next appt: Visit date not found    Last OARRS:        No data to display

## 2024-02-21 ENCOUNTER — HOSPITAL ENCOUNTER (OUTPATIENT)
Dept: PHYSICAL THERAPY | Age: 39
Setting detail: THERAPIES SERIES
Discharge: HOME | End: 2024-02-21
Payer: MEDICAID

## 2024-02-21 PROCEDURE — 97140 MANUAL THERAPY 1/> REGIONS: CPT

## 2024-02-21 PROCEDURE — 97110 THERAPEUTIC EXERCISES: CPT

## 2024-02-21 NOTE — FLOWSHEET NOTE
Traction  [x] Patient education on joint protection, postural re-education, activity modification, progression of HEP.        [] Aquatic Therapy    Plan: Cont POC- Continue emphasis/focus on exercise progression, improving proper muscle recruitment and activation/motor control patterns, modulating pain, promoting relaxation, and increasing ROM. Next visit plan to progress weights, progress reps, and add new exercises     Electronically Signed by Kena Elkins, PT  Date: 02/21/2024    Note: Portions of this note have been templated and/or copied from initial evaluation, reassessments and prior notes for documentation efficiency.

## 2024-02-23 ENCOUNTER — HOSPITAL ENCOUNTER (OUTPATIENT)
Dept: SLEEP CENTER | Age: 39
Discharge: HOME | End: 2024-02-23

## 2024-02-23 DIAGNOSIS — R53.83 FATIGUE, UNSPECIFIED TYPE: ICD-10-CM

## 2024-02-23 DIAGNOSIS — R06.89 GASPING FOR BREATH: ICD-10-CM

## 2024-02-23 DIAGNOSIS — G47.10 HYPERSOMNIA: ICD-10-CM

## 2024-02-26 ENCOUNTER — HOSPITAL ENCOUNTER (OUTPATIENT)
Dept: PHYSICAL THERAPY | Age: 39
Setting detail: THERAPIES SERIES
Discharge: HOME | End: 2024-02-26
Payer: MEDICAID

## 2024-02-26 PROCEDURE — 97140 MANUAL THERAPY 1/> REGIONS: CPT

## 2024-02-26 PROCEDURE — 97110 THERAPEUTIC EXERCISES: CPT

## 2024-02-26 PROCEDURE — 20560 NDL INSJ W/O NJX 1 OR 2 MUSC: CPT

## 2024-02-26 NOTE — FLOWSHEET NOTE
pain, promoting relaxation, and reduce/eliminate soft tissue swelling/inflammation/restriction.Patient will continue to benefit from ongoing evaluation and advanced clinical decision from a Physical Therapist to address and improve pain control, muscle strength, neuromuscular control, and endurance to safely return to PLOF without symptoms or restrictions.    Medical Necessity Documentation:  I certify that this patient meets the below criteria necessary for medical necessity for care and/or justification of therapy services:  The patient has functional impairments and/or activity limitations and would benefit from continued outpatient therapy services to address the deficits outlined in the patients goals  The patient had a prior episode of outpatient therapy during this calendar year for a different condition.  Current diagnosis requires skilled therapeutic intervention.      Return to Play: NA    Prognosis for POC: [x] Good [] Fair  [] Poor    Patient requires continued skilled intervention: [x] Yes  [] No      CHARGE CAPTURE     PT CHARGE GRID   CPT Code (TIMED) minutes # CPT Code (UNTIMED) #     Therex (67229)  20 1  EVAL:LOW (85374 - Typically 20 minutes face-to-face)     Neuromusc. Re-ed (95674)    Re-Eval (53608)     Manual (20041) 10 1  Estim Unattended (99680)     Ther. Act (60120)    [facility] (91042[facility]     Gait (61714)    Dry Needle 1-2 muscle (04092)     Aquatic Therex (89344)    Dry Needle 3+ muscle (20561)     Iontophoresis (56194)    VASO (74532)     Ultrasound (95335)    Group Therapy (67719)     Estim Attended (10560)    Canalith Repositioning (13117)     Other:    Other:    Total Timed Code Tx Minutes 30 2       Total Treatment Minutes 45        Charge Justification:  (45465) THERAPEUTIC EXERCISE - Provided verbal/tactile cueing for activities related to strengthening, flexibility, endurance, ROM performed to prevent loss of range of motion, maintain or improve muscular strength or increase

## 2024-02-27 ENCOUNTER — TELEPHONE (OUTPATIENT)
Dept: PULMONOLOGY | Age: 39
End: 2024-02-27

## 2024-02-27 NOTE — TELEPHONE ENCOUNTER
Patient's sleep study is negative for JACOB. I will contact them to discuss results.     Antoine Saha MD

## 2024-02-28 ENCOUNTER — HOSPITAL ENCOUNTER (OUTPATIENT)
Dept: PHYSICAL THERAPY | Age: 39
Setting detail: THERAPIES SERIES
Discharge: HOME | End: 2024-02-28
Payer: MEDICAID

## 2024-02-28 PROBLEM — G47.10 HYPERSOMNIA: Status: ACTIVE | Noted: 2024-02-28

## 2024-02-28 PROCEDURE — 97012 MECHANICAL TRACTION THERAPY: CPT

## 2024-02-28 PROCEDURE — 97140 MANUAL THERAPY 1/> REGIONS: CPT

## 2024-02-28 PROCEDURE — 97110 THERAPEUTIC EXERCISES: CPT

## 2024-02-28 NOTE — FLOWSHEET NOTE
[facility]- Outpatient Rehabilitation and Therapy [address], [place] [address] office: [phone] fax: [phone]           Physical Therapy: TREATMENT/PROGRESS NOTE   Patient: Kenia Vieyra (38 y.o. female)   Examination Date: 2024   :  1985 MRN: [id number]   Visit #: Visit count could not be calculated. Make sure you are using a visit which is associated with an episode.   Insurance Allowable Auth Needed   BMN [x]Yes    []No    Insurance: Payor: [place] [address] / Plan: [place] [employer] / Product Type: *No Product type* /   Insurance ID: [id number] - (Medicaid Managed)  Secondary Insurance (if applicable):    Treatment Diagnosis:     1. Neck pain  M54.2          2. Chronic right-sided thoracic back pain  M54.6       G89.29          Medical Diagnosis:  Chronic thoracic back pain, unspecified back pain laterality [M54.6, G89.29]  Scoliosis of thoracic spine, unspecified scoliosis type [M41.9]  Cervicalgia [M54.2]  Radiculopathy, cervical region [M54.12]  Myalgia, other site [M79.18]  Other cervical disc displacement,    Referring Physician: Skyla Bran,*  PCP: Zoey Starr MD       Plan of care signed (Y/N): Y    Date of Patient follow up with Physician:      Progress Report/POC: NO  POC update due: (10 visits /OR AUTH LIMITS, whichever is less)  3/29/2024                                             Precautions/ Contra-indications:           Latex allergy:  NO  Pacemaker:    NO  Contraindications for Manipulation: None  Date of Surgery: n/a  Other:    Red Flags:  None    C-SSRS Triggered by Intake questionnaire:   [x] No, Questionnaire did not trigger screening.   [] Yes, Patient intake triggered further evaluation      [] C-SSRS Screening completed  [] PCP notified via Plan of Care  [] Emergency services notified     Preferred Language for Healthcare:   [x] English       [] other:    SUBJECTIVE EXAMINATION

## 2024-03-01 NOTE — TELEPHONE ENCOUNTER
I attempted to reach patient by phone but unsuccessfully. I will send them a [employer] message.    Antoine Saha MD

## 2024-03-04 ENCOUNTER — HOSPITAL ENCOUNTER (OUTPATIENT)
Dept: PHYSICAL THERAPY | Age: 39
Setting detail: THERAPIES SERIES
Discharge: HOME | End: 2024-03-04
Payer: MEDICAID

## 2024-03-04 PROCEDURE — 20561 NDL INSJ W/O NJX 3+ MUSC: CPT

## 2024-03-04 PROCEDURE — 97140 MANUAL THERAPY 1/> REGIONS: CPT

## 2024-03-04 PROCEDURE — 97012 MECHANICAL TRACTION THERAPY: CPT

## 2024-03-04 PROCEDURE — 97110 THERAPEUTIC EXERCISES: CPT

## 2024-03-04 NOTE — FLOWSHEET NOTE
functional mobility, ADLs and prior level of function   Status: [] Progressing: [] Met: [] Not Met: [] Adjusted  Patient will return to  Reaching activities and Dressing without increased symptoms or restriction to work towards return to prior level of function.        Status: [] Progressing: [] Met: [] Not Met: [] Adjusted  Patient will perform normal ADLs without symptoms 75% of the time             Status: [] Progressing: [] Met: [] Not Met: [] Adjusted    TREATMENT PLAN     Frequency/Duration: 1-2x/week for  12  weeks for the following treatment interventions:    Interventions:  [x] Therapeutic exercise including: strength training, ROM, including postural re-education.   [x] NMR activation and proprioception, including postural re-education.    [x] Manual therapy as indicated to include: PROM, Gr I-IV mobilizations, and STM  [x] Modalities as needed that may include: Cryotherapy, Electrical Stimulation, Thermal Agents, and Traction  [x] Patient education on joint protection, postural re-education, activity modification, progression of HEP.        [] Aquatic Therapy    Plan: Cont POC- Continue emphasis/focus on exercise progression, improving proper muscle recruitment and activation/motor control patterns, modulating pain, promoting relaxation, and increasing ROM. Next visit plan to progress weights, progress reps, and add new exercises     Electronically Signed by Kena Elkins PT  Date: 03/04/2024    Note: Portions of this note have been templated and/or copied from initial evaluation, reassessments and prior notes for documentation efficiency.

## 2024-03-06 ENCOUNTER — HOSPITAL ENCOUNTER (OUTPATIENT)
Dept: PHYSICAL THERAPY | Age: 39
Setting detail: THERAPIES SERIES
Discharge: HOME | End: 2024-03-06
Payer: MEDICAID

## 2024-03-06 PROCEDURE — 97012 MECHANICAL TRACTION THERAPY: CPT

## 2024-03-06 PROCEDURE — 97140 MANUAL THERAPY 1/> REGIONS: CPT

## 2024-03-06 PROCEDURE — 97110 THERAPEUTIC EXERCISES: CPT

## 2024-03-06 PROCEDURE — 20561 NDL INSJ W/O NJX 3+ MUSC: CPT

## 2024-03-06 NOTE — FLOWSHEET NOTE
[facility]- Outpatient Rehabilitation and Therapy [address], [place] [address] office: [phone] fax: [phone]           Physical Therapy: TREATMENT/PROGRESS NOTE   Patient: Kenia Vieyra (38 y.o. female)   Examination Date: 2024   :  1985 MRN: [id number]   Visit #: 6   Insurance Allowable Auth Needed   BMN [x]Yes    []No    Insurance: Payor: [employer] [place] / Plan: [employer] [place]AN [place] / Product Type: *No Product type* /   Insurance ID: [id number] - (Medicaid Managed)  Secondary Insurance (if applicable):    Treatment Diagnosis:     1. Neck pain  M54.2          2. Chronic right-sided thoracic back pain  M54.6       G89.29          Medical Diagnosis:  Chronic thoracic back pain, unspecified back pain laterality [M54.6, G89.29]  Scoliosis of thoracic spine, unspecified scoliosis type [M41.9]  Cervicalgia [M54.2]  Radiculopathy, cervical region [M54.12]  Myalgia, other site [M79.18]  Other cervical disc displacement,    Referring Physician: Skyla Bran,*  PCP: Zoey Starr MD       Plan of care signed (Y/N): Y    Date of Patient follow up with Physician:      Progress Report/POC: NO  POC update due: (10 visits /OR AUTH LIMITS, whichever is less)  2024                                             Precautions/ Contra-indications:           Latex allergy:  NO  Pacemaker:    NO  Contraindications for Manipulation: None  Date of Surgery: n/a  Other:    Red Flags:  None    C-SSRS Triggered by Intake questionnaire:   [x] No, Questionnaire did not trigger screening.   [] Yes, Patient intake triggered further evaluation      [] C-SSRS Screening completed  [] PCP notified via Plan of Care  [] Emergency services notified     Preferred Language for Healthcare:   [x] English       [] other:    SUBJECTIVE EXAMINATION     Patient stated complaint: Pt reports she is feeling good. She felt like she could manage her symptoms

## 2024-03-07 ENCOUNTER — OFFICE VISIT (OUTPATIENT)
Age: 39
End: 2024-03-07

## 2024-03-07 DIAGNOSIS — F41.9 ANXIETY: Primary | ICD-10-CM

## 2024-03-07 ASSESSMENT — PROMIS GLOBAL HEALTH SCALE
IN THE PAST 7 DAYS, HOW OFTEN HAVE YOU BEEN BOTHERED BY EMOTIONAL PROBLEMS, SUCH AS FEELING ANXIOUS, DEPRESSED, OR IRRITABLE [ON A SCALE FROM 1 (NEVER) TO 5 (ALWAYS)]?: 3
IN THE PAST 7 DAYS, HOW WOULD YOU RATE YOUR PAIN ON AVERAGE [ON A SCALE FROM 0 (NO PAIN) TO 10 (WORST IMAGINABLE PAIN)]?: 3
IN GENERAL, PLEASE RATE HOW WELL YOU CARRY OUT YOUR USUAL SOCIAL ACTIVITIES (INCLUDES ACTIVITIES AT HOME, AT WORK, AND IN YOUR COMMUNITY, AND RESPONSIBILITIES AS A PARENT, CHILD, SPOUSE, EMPLOYEE, FRIEND, ETC) [ON A SCALE OF 1 (POOR) TO 5 (EXCELLENT)]?: 2
IN GENERAL, WOULD YOU SAY YOUR QUALITY OF LIFE IS...[ON A SCALE OF 1 (POOR) TO 5 (EXCELLENT)]: 3
IN GENERAL, HOW WOULD YOU RATE YOUR MENTAL HEALTH, INCLUDING YOUR MOOD AND YOUR ABILITY TO THINK [ON A SCALE OF 1 (POOR) TO 5 (EXCELLENT)]?: 3
TO WHAT EXTENT ARE YOU ABLE TO CARRY OUT YOUR EVERYDAY PHYSICAL ACTIVITIES SUCH AS WALKING, CLIMBING STAIRS, CARRYING GROCERIES, OR MOVING A CHAIR [ON A SCALE OF 1 (NOT AT ALL) TO 5 (COMPLETELY)]?: 3
IN GENERAL, HOW WOULD YOU RATE YOUR SATISFACTION WITH YOUR SOCIAL ACTIVITIES AND RELATIONSHIPS [ON A SCALE OF 1 (POOR) TO 5 (EXCELLENT)]?: 2
IN GENERAL, WOULD YOU SAY YOUR HEALTH IS...[ON A SCALE OF 1 (POOR) TO 5 (EXCELLENT)]: 3
SUM OF RESPONSES TO QUESTIONS 2, 4, 5, & 10: 11
IN GENERAL, HOW WOULD YOU RATE YOUR PHYSICAL HEALTH [ON A SCALE OF 1 (POOR) TO 5 (EXCELLENT)]?: 3
SUM OF RESPONSES TO QUESTIONS 3, 6, 7, & 8: 13
IN THE PAST 7 DAYS, HOW WOULD YOU RATE YOUR FATIGUE ON AVERAGE [ON A SCALE FROM 1 (NONE) TO 5 (VERY SEVERE)]?: 4

## 2024-03-07 NOTE — PROGRESS NOTES
Behavioral Health Consultation   Cinthia Aguiar, PhD  Clinical Psychologist  3/7/2024      Time spent with Patient: 26 minutes 3:35 - 4:01  This is patient's 11 th  [facility] appointment.    Reason for Consult:   Chief Complaint   Patient presents with    Anxiety    Stress    Chronic Pain       Referring Provider: Zoey Starr MD    Subjective  PT Improvement Questions  How is (target problem) going for you? Same, better, or worse? better    What specifically has changed (if anything)?  Progressing slowly, but it is challenging due to physical issues  Brain fog is difficult. Needs to start thinking about , it is overwhelming    Has anyone else noticed any change(s)?  If so, what? N/a    What do you think is causing the change or keeping it from getting worse?  Working very hard to be mobile, also acknowledging the importance of allowing self to be still , working to be compassionate towards herself, it was a hard month. Deep breathing continues to be helpful - counting from 10 to 1      Plan Implementation Questions    Recommendations to Patient:   1. Continue with current coping strategies    2. Continue to advocate for yourself with PT  3. Consider pausing before a phone call, and recovery time after - perhaps guided meditation     What part were you able to do? Some of it  Did it help/what were the results?  Yes      FUNCTIONAL ANALYSIS (if applicable)     Risk Assessment: Patient is judged to be at  low risk for harm to self/others due to  no current thoughts/reports of suicide and homicide.     Objective  Level of distress: Low  Orientation: Person, Place, Time, and Situation  Appearance: Well-groomed, Awake, Alert, and Good eye contact  Other: mood is euthymic        3/7/24 PROMIS-10 Scores: Physical: 13 Mental: 11    PROMIS Raw Score   WNL Symptoms /Impairment     Mild Moderate Severe   Global Physical Health 15-20 13-14 9-12 4-8   Global Mental Health 14-20 11-13 7-10 4-6       Assessment and

## 2024-03-11 ENCOUNTER — HOSPITAL ENCOUNTER (OUTPATIENT)
Dept: PHYSICAL THERAPY | Age: 39
Setting detail: THERAPIES SERIES
Discharge: HOME | End: 2024-03-11
Payer: MEDICAID

## 2024-03-11 ENCOUNTER — OFFICE VISIT (OUTPATIENT)
Age: 39
End: 2024-03-11

## 2024-03-11 VITALS
WEIGHT: 150.9 LBS | HEART RATE: 94 BPM | TEMPERATURE: 97.7 F | SYSTOLIC BLOOD PRESSURE: 127 MMHG | BODY MASS INDEX: 29.63 KG/M2 | DIASTOLIC BLOOD PRESSURE: 80 MMHG | HEIGHT: 60 IN | OXYGEN SATURATION: 100 %

## 2024-03-11 DIAGNOSIS — F33.1 MDD (MAJOR DEPRESSIVE DISORDER), RECURRENT EPISODE, MODERATE (HCC): ICD-10-CM

## 2024-03-11 DIAGNOSIS — F41.1 GENERALIZED ANXIETY DISORDER WITH PANIC ATTACKS: Primary | ICD-10-CM

## 2024-03-11 DIAGNOSIS — M79.7 FIBROMYALGIA: ICD-10-CM

## 2024-03-11 DIAGNOSIS — F41.0 GENERALIZED ANXIETY DISORDER WITH PANIC ATTACKS: Primary | ICD-10-CM

## 2024-03-11 DIAGNOSIS — G47.09 OTHER INSOMNIA: ICD-10-CM

## 2024-03-11 PROCEDURE — 97110 THERAPEUTIC EXERCISES: CPT

## 2024-03-11 PROCEDURE — 97140 MANUAL THERAPY 1/> REGIONS: CPT

## 2024-03-11 PROCEDURE — 97012 MECHANICAL TRACTION THERAPY: CPT

## 2024-03-11 RX ORDER — AMITRIPTYLINE HYDROCHLORIDE 10 MG/1
TABLET, FILM COATED ORAL
Qty: 90 TABLET | Refills: 1 | Status: SHIPPED | OUTPATIENT
Start: 2024-03-11

## 2024-03-11 RX ORDER — TIZANIDINE 2 MG/1
2 TABLET ORAL
COMMUNITY
Start: 2024-02-27

## 2024-03-11 RX ORDER — HYDROXYZINE HYDROCHLORIDE 25 MG/1
25 TABLET, FILM COATED ORAL EVERY 8 HOURS PRN
Qty: 90 TABLET | Refills: 1 | Status: SHIPPED | OUTPATIENT
Start: 2024-03-11

## 2024-03-11 ASSESSMENT — PATIENT HEALTH QUESTIONNAIRE - PHQ9
4. FEELING TIRED OR HAVING LITTLE ENERGY: 3
2. FEELING DOWN, DEPRESSED OR HOPELESS: 1
9. THOUGHTS THAT YOU WOULD BE BETTER OFF DEAD, OR OF HURTING YOURSELF: 0
SUM OF ALL RESPONSES TO PHQ QUESTIONS 1-9: 13
8. MOVING OR SPEAKING SO SLOWLY THAT OTHER PEOPLE COULD HAVE NOTICED. OR THE OPPOSITE, BEING SO FIGETY OR RESTLESS THAT YOU HAVE BEEN MOVING AROUND A LOT MORE THAN USUAL: 1
SUM OF ALL RESPONSES TO PHQ9 QUESTIONS 1 & 2: 2
3. TROUBLE FALLING OR STAYING ASLEEP: 3
1. LITTLE INTEREST OR PLEASURE IN DOING THINGS: 1
SUM OF ALL RESPONSES TO PHQ QUESTIONS 1-9: 13
6. FEELING BAD ABOUT YOURSELF - OR THAT YOU ARE A FAILURE OR HAVE LET YOURSELF OR YOUR FAMILY DOWN: 1
SUM OF ALL RESPONSES TO PHQ QUESTIONS 1-9: 13
SUM OF ALL RESPONSES TO PHQ QUESTIONS 1-9: 13
7. TROUBLE CONCENTRATING ON THINGS, SUCH AS READING THE NEWSPAPER OR WATCHING TELEVISION: 2
5. POOR APPETITE OR OVEREATING: 1

## 2024-03-11 ASSESSMENT — ANXIETY QUESTIONNAIRES
7. FEELING AFRAID AS IF SOMETHING AWFUL MIGHT HAPPEN: 0
4. TROUBLE RELAXING: 2
2. NOT BEING ABLE TO STOP OR CONTROL WORRYING: 0
3. WORRYING TOO MUCH ABOUT DIFFERENT THINGS: 0
5. BEING SO RESTLESS THAT IT IS HARD TO SIT STILL: 1
1. FEELING NERVOUS, ANXIOUS, OR ON EDGE: 1
6. BECOMING EASILY ANNOYED OR IRRITABLE: 1
GAD7 TOTAL SCORE: 5

## 2024-03-11 NOTE — PROGRESS NOTES
Outpatient Psychiatry Integrative Care  Follow-up Psychiatric Evaluation   [facility]     Patient name: Kenia Vieyra  YOB: 1985  MRN: [id number]  Payor: Payor: [employer] [facility] / Plan: [facility] PLAN [place] / Product Type: *No Product type* /   Day of Service: 3/11/24    Vitals:    03/11/24 1540   BP: 127/80   Pulse: 94   Temp: 97.7 °F (36.5 °C)   SpO2: 100%     Referring Primary Care Clinician: Zoey Starr MD    Face to Face Time: 60 minutes      Chief Complaint:     Chief Complaint   Patient presents with    Anxiety     4 week follow up        Assessment and Plan:     Kenia Vieyra is a 38 y.o. Other female with a history of fibromyalgia and anxiety who presents to outpatient primary care psychiatry on 3/11/24 for follow-up psychiatric evaluation with chief complaint of anxiety and sleep.    DSM 5 Diagnosis:   1. Fibromyalgia  2. Generalized anxiety disorder with panic attacks  3. MDD (major depressive disorder), recurrent episode, moderate (HCC):  Improving: PHQ9 improved today 12>17>13 and GAD7 improved from 9>6>5.   -Continue amitriptyline 10mg in the morning and 20mg at night. If worsened daytime fatigue/brain fog with this consider taking entire 30mg dose at night  -Prev discussed option of trial of Savella (milnacipran) or fetzima (levomilnacipran) instead of/in addition to amitriptyline however pt would like to first trial dose increase dose of amitriptyline because she did not do well on duloxetine previously, developed SI on this med  -Continue Gabapentin 100mg TID  -Continue hydroxyzine 25mg TID prn for anxiety/panic  -Continue tizanidine per Rheumatology  -Continue engagement in therapy, pt has derived great benefit from CBT/DBT    4. Other insomnia  -Sleep study negative for JACOB! Sleep med rec CBT-I.  -Initiated CBT-I with patient today. Patient showed me [employer] sleep data, patient has a lot of variation with times she is

## 2024-03-11 NOTE — PATIENT INSTRUCTIONS
Continue amitriptyline 10mg in the morning and 20mg at night  Continue gabapentin, tizanidine, and hydroxyzine as you are taking it, as needed  Can try biotene or eye drops    Sleep prescription:  -Set wake up time of 9:30am +/-15min either way, every day. Try to get out of bed by this time  -Take your nighttime medicine around 11:15pm  -Set bedtime of: 12am midnight. Try not to go to bed until then  -Until this time, do some light activities to stay awake but not activate you, intimacy or reading or puzzles are ok but try to limit screens  -This may be harder/harder before it gets easier, this is ok  -Remember the bed is for sleep and sex only, try not to do other activities/be awake in the bed, if you are having a hard time falling asleep, get out of bed for 20-30 min then return to the bed

## 2024-03-11 NOTE — FLOWSHEET NOTE
[facility]- Outpatient Rehabilitation and Therapy [address], [place] [address] office: [phone] fax: [phone]           Physical Therapy: TREATMENT/PROGRESS NOTE   Patient: Kenia Vieyra (38 y.o. female)   Examination Date: 2024   :  1985 MRN: [id number]   Visit #: Visit count could not be calculated. Make sure you are using a visit which is associated with an episode.   Insurance Allowable Auth Needed   BMN [x]Yes    []No    Insurance: Payor: [employer] / Plan: [employer] PLAN [place] / Product Type: *No Product type* /   Insurance ID: [id number] - (Medicaid Managed)  Secondary Insurance (if applicable):    Treatment Diagnosis:     1. Neck pain  M54.2          2. Chronic right-sided thoracic back pain  M54.6       G89.29          Medical Diagnosis:  Chronic thoracic back pain, unspecified back pain laterality [M54.6, G89.29]  Scoliosis of thoracic spine, unspecified scoliosis type [M41.9]  Cervicalgia [M54.2]  Radiculopathy, cervical region [M54.12]  Myalgia, other site [M79.18]  Other cervical disc displacement,    Referring Physician: Skyla Bran,*  PCP: Zoey Starr MD       Plan of care signed (Y/N): Y    Date of Patient follow up with Physician:      Progress Report/POC: NO  POC update due: (10 visits /OR AUTH LIMITS, whichever is less)  4/10/2024                                             Precautions/ Contra-indications:           Latex allergy:  NO  Pacemaker:    NO  Contraindications for Manipulation: None  Date of Surgery: n/a  Other:    Red Flags:  None    C-SSRS Triggered by Intake questionnaire:   [x] No, Questionnaire did not trigger screening.   [] Yes, Patient intake triggered further evaluation      [] C-SSRS Screening completed  [] PCP notified via Plan of Care  [] Emergency services notified     Preferred Language for Healthcare:   [x] English       [] other:    SUBJECTIVE EXAMINATION

## 2024-03-13 ENCOUNTER — HOSPITAL ENCOUNTER (OUTPATIENT)
Dept: PHYSICAL THERAPY | Age: 39
Setting detail: THERAPIES SERIES
Discharge: HOME | End: 2024-03-13
Payer: MEDICAID

## 2024-03-13 PROCEDURE — 20560 NDL INSJ W/O NJX 1 OR 2 MUSC: CPT

## 2024-03-13 PROCEDURE — 97110 THERAPEUTIC EXERCISES: CPT

## 2024-03-13 PROCEDURE — 97140 MANUAL THERAPY 1/> REGIONS: CPT

## 2024-03-13 PROCEDURE — 97012 MECHANICAL TRACTION THERAPY: CPT

## 2024-03-13 NOTE — FLOWSHEET NOTE
region this visit. Pt feels overall less triggered.        Test used Initial score  2/19/24 03/13/2024   Pain Summary VAS 3/10 2-3/10   Functional questionnaire Neck Disability Index 25%    Other:                OBJECTIVE EXAMINATION     ROM/MMT: see eval       Exercises/Interventions     Therapeutic Ex (27390)  resistance Sets/time Reps Notes/Cues/Progressions   LS S  10\" 5    Open book  1 10 + 5 bilat   Bilat ER  2 10 GTB, ^2/28   Chin tuck  1 10    Shoulder ext  3 10 No resistance, 2/21   Sciatic nn glide seated  2 5 Added 3/6                               Manual Intervention (76990)  TIME     STM bilat cervical paraspinals, UPA R C4,5   x8'     STM R thoracic paraspinals, R thoracic UPA, R cervical UPA  x4'     Manual traction  x3'                   NMR re-education (53183) resistance Sets/time Reps CUES NEEDED                                      Therapeutic Activity (37292)  Sets/time                                          Modalities:    Mechanical Traction: Applied to Cervical Spine for 10 minutes. Parameters: 15 lb/5 lb  Dry needling manual therapy: consisted on the placement of 10 needles in the following muscles:  upper trap R, cervical multifidi bilat C3, 4,  .  A 30-60 mm needle was inserted, piston, rotated, and coned to produce intramuscular mobilization.  These techniques were used to restore functional range of motion, reduce muscle spasm and induce healing in the corresponding musculature. (68486)  Clean Technique was utilized today while applying Dry needling treatment.  The treatment sites where cleaned with 70% solution of  isopropyl alcohol .  The PT washed their hands and utilized treatment gloves along with hand  prior to inserting the needles.  All needles were removed and discarded in the appropriate sharps container.         Education/Home Exercise Program: Patient HEP program created electronically.  Refer to [employer] access code: [email or web address]        ASSESSMENT     Today's

## 2024-03-19 ENCOUNTER — PATIENT MESSAGE (OUTPATIENT)
Age: 39
End: 2024-03-19

## 2024-03-20 ENCOUNTER — HOSPITAL ENCOUNTER (OUTPATIENT)
Dept: PHYSICAL THERAPY | Age: 39
Setting detail: THERAPIES SERIES
Discharge: HOME | End: 2024-03-20
Payer: MEDICAID

## 2024-03-20 PROCEDURE — 97140 MANUAL THERAPY 1/> REGIONS: CPT

## 2024-03-20 PROCEDURE — 97012 MECHANICAL TRACTION THERAPY: CPT

## 2024-03-20 PROCEDURE — 97110 THERAPEUTIC EXERCISES: CPT

## 2024-03-20 NOTE — FLOWSHEET NOTE
proprioception, including postural re-education.    [x] Manual therapy as indicated to include: PROM, Gr I-IV mobilizations, and STM  [x] Modalities as needed that may include: Cryotherapy, Electrical Stimulation, Thermal Agents, and Traction  [x] Patient education on joint protection, postural re-education, activity modification, progression of HEP.        [] Aquatic Therapy    Plan: Cont POC- Continue emphasis/focus on exercise progression and increasing ROM. Next visit plan to progress weights, progress reps, and add new exercises     Electronically Signed by Kena Elkins, PT  Date: 03/20/2024    Note: Portions of this note have been templated and/or copied from initial evaluation, reassessments and prior notes for documentation efficiency.

## 2024-03-25 ENCOUNTER — APPOINTMENT (OUTPATIENT)
Dept: PHYSICAL THERAPY | Age: 39
End: 2024-03-25
Payer: MEDICAID

## 2024-03-27 ENCOUNTER — HOSPITAL ENCOUNTER (OUTPATIENT)
Dept: PHYSICAL THERAPY | Age: 39
Setting detail: THERAPIES SERIES
Discharge: HOME | End: 2024-03-27
Payer: MEDICAID

## 2024-03-27 PROCEDURE — 97110 THERAPEUTIC EXERCISES: CPT

## 2024-03-27 PROCEDURE — 97140 MANUAL THERAPY 1/> REGIONS: CPT

## 2024-03-27 PROCEDURE — 97012 MECHANICAL TRACTION THERAPY: CPT

## 2024-03-27 NOTE — FLOWSHEET NOTE
significant benefit and progressive improvement with introduction of cervical traction and would benefit from a home traction unit.     Medical Necessity Documentation:  I certify that this patient meets the below criteria necessary for medical necessity for care and/or justification of therapy services:  The patient has functional impairments and/or activity limitations and would benefit from continued outpatient therapy services to address the deficits outlined in the patients goals      Return to Play: NA    Prognosis for POC: [x] Good [] Fair  [] Poor    Patient requires continued skilled intervention: [x] Yes  [] No      CHARGE CAPTURE     PT CHARGE GRID   CPT Code (TIMED) minutes # CPT Code (UNTIMED) #     Therex (55259)  20 1  EVAL:LOW (02111 - Typically 20 minutes face-to-face)     Neuromusc. Re-ed (46291)    Re-Eval (14851)     Manual (33981) 8 1  Estim Unattended (76526)     Ther. Act (72192)    Mech. Traction (20824) 1    Gait (04175)    Dry Needle 1-2 muscle (03282)     Aquatic Therex (45449)    Dry Needle 3+ muscle (20561)     Iontophoresis (09969)    VASO (24723)     Ultrasound (62041)    Group Therapy (75987)     Estim Attended (19701)    Canalith Repositioning (19530)     Other:    Other:    Total Timed Code Tx Minutes 28 2       Total Treatment Minutes 40        Charge Justification:  (24138) THERAPEUTIC EXERCISE - Provided verbal/tactile cueing for activities related to strengthening, flexibility, endurance, ROM performed to prevent loss of range of motion, maintain or improve muscular strength or increase flexibility, following either an injury or surgery.   (90544) HOME EXERCISE PROGRAM - Reviewed/Progressed HEP activities related to strengthening, flexibility, endurance, ROM performed to prevent loss of range of motion, maintain or improve muscular strength or increase flexibility, following either an injury or surgery.  (11335) MANUAL THERAPY -  Manual therapy techniques, 1 or more regions,

## 2024-03-28 ENCOUNTER — OFFICE VISIT (OUTPATIENT)
Age: 39
End: 2024-03-28
Payer: MEDICAID

## 2024-03-28 DIAGNOSIS — F41.9 ANXIETY: Primary | ICD-10-CM

## 2024-03-28 PROCEDURE — 1036F TOBACCO NON-USER: CPT | Performed by: PSYCHOLOGIST

## 2024-03-28 PROCEDURE — 90832 PSYTX W PT 30 MINUTES: CPT | Performed by: PSYCHOLOGIST

## 2024-03-28 ASSESSMENT — PROMIS GLOBAL HEALTH SCALE
IN GENERAL, WOULD YOU SAY YOUR HEALTH IS...[ON A SCALE OF 1 (POOR) TO 5 (EXCELLENT)]: GOOD
IN THE PAST 7 DAYS, HOW OFTEN HAVE YOU BEEN BOTHERED BY EMOTIONAL PROBLEMS, SUCH AS FEELING ANXIOUS, DEPRESSED, OR IRRITABLE [ON A SCALE FROM 1 (NEVER) TO 5 (ALWAYS)]?: SOMETIMES
IN GENERAL, HOW WOULD YOU RATE YOUR PHYSICAL HEALTH [ON A SCALE OF 1 (POOR) TO 5 (EXCELLENT)]?: GOOD
TO WHAT EXTENT ARE YOU ABLE TO CARRY OUT YOUR EVERYDAY PHYSICAL ACTIVITIES SUCH AS WALKING, CLIMBING STAIRS, CARRYING GROCERIES, OR MOVING A CHAIR [ON A SCALE OF 1 (NOT AT ALL) TO 5 (COMPLETELY)]?: MOSTLY
IN GENERAL, PLEASE RATE HOW WELL YOU CARRY OUT YOUR USUAL SOCIAL ACTIVITIES (INCLUDES ACTIVITIES AT HOME, AT WORK, AND IN YOUR COMMUNITY, AND RESPONSIBILITIES AS A PARENT, CHILD, SPOUSE, EMPLOYEE, FRIEND, ETC) [ON A SCALE OF 1 (POOR) TO 5 (EXCELLENT)]?: FAIR
IN THE PAST 7 DAYS, HOW WOULD YOU RATE YOUR PAIN ON AVERAGE [ON A SCALE FROM 0 (NO PAIN) TO 10 (WORST IMAGINABLE PAIN)]?: 3
IN GENERAL, WOULD YOU SAY YOUR QUALITY OF LIFE IS...[ON A SCALE OF 1 (POOR) TO 5 (EXCELLENT)]: GOOD
SUM OF RESPONSES TO QUESTIONS 2, 4, 5, & 10: 11
IN GENERAL, HOW WOULD YOU RATE YOUR SATISFACTION WITH YOUR SOCIAL ACTIVITIES AND RELATIONSHIPS [ON A SCALE OF 1 (POOR) TO 5 (EXCELLENT)]?: FAIR
IN THE PAST 7 DAYS, HOW WOULD YOU RATE YOUR FATIGUE ON AVERAGE [ON A SCALE FROM 1 (NONE) TO 5 (VERY SEVERE)]?: MILD
SUM OF RESPONSES TO QUESTIONS 3, 6, 7, & 8: 14
IN GENERAL, HOW WOULD YOU RATE YOUR MENTAL HEALTH, INCLUDING YOUR MOOD AND YOUR ABILITY TO THINK [ON A SCALE OF 1 (POOR) TO 5 (EXCELLENT)]?: GOOD

## 2024-03-28 NOTE — PROGRESS NOTES
Behavioral Health Consultation   Cinthia Aguiar, PhD  Clinical Psychologist  3/28/2024      Time spent with Patient: 30 minutes 3:35 -4:05  This is patient's 12 th  [facility] appointment.    Reason for Consult:   Chief Complaint   Patient presents with    Anxiety    Chronic Pain    Stress       Referring Provider: Zoey Starr MD    Subjective  PT Improvement Questions  How is (target problem) going for you? Same, better, or worse? better    What specifically has changed (if anything)?  Several times throughout the day has to acknowledge how challenging this is  Fatigue has improved, brain fog is increased. Sleep is disrupted - goes to bed very late. Trying 12 - 12:30, gets into bed 1:00 or 1:30 - falling asleep between 2-2:30 - awake after 3 hours - takes an hour to fall asleep - uses deep breathing, body scan,. Stops to be still in the day when brain fog is bad    Has anyone else noticed any change(s)?  If so, what? N/a    What do you think is causing the change or keeping it from getting worse?  Using breathing strategy, pacing herself    Plan Implementation Questions    Recommendations to Patient:   1.  Continue with current coping strategies     What part were you able to do? All of it  Did it help/what were the results?  Yes    FUNCTIONAL ANALYSIS (if applicable)     Risk Assessment: Patient is judged to be at  low risk for harm to self/others due to  no current thoughts/reports of suicide and homicide.     Objective  Level of distress: Low  Orientation: Person, Place, Time, and Situation  Appearance: Well-groomed, Awake, Alert, and Good eye contact  Other: mood is euthymic      3/28/24 PROMIS-10 Scores: Physical: 14 Mental: 11    PROMIS Raw Score   WNL Symptoms /Impairment     Mild Moderate Severe   Global Physical Health 15-20 13-14 9-12 4-8   Global Mental Health 14-20 11-13 7-10 4-6     Assessment and Plan  Anxiety. Pt reports stability in functioning. Fatigue has improved somewhat, but her sense of brain

## 2024-04-01 ENCOUNTER — HOSPITAL ENCOUNTER (OUTPATIENT)
Dept: PHYSICAL THERAPY | Age: 39
Setting detail: THERAPIES SERIES
Discharge: HOME | End: 2024-04-01
Payer: MEDICAID

## 2024-04-01 PROCEDURE — 97140 MANUAL THERAPY 1/> REGIONS: CPT

## 2024-04-01 PROCEDURE — 97012 MECHANICAL TRACTION THERAPY: CPT

## 2024-04-01 PROCEDURE — 97110 THERAPEUTIC EXERCISES: CPT

## 2024-04-01 NOTE — FLOWSHEET NOTE
[facility]- Outpatient Rehabilitation and Therapy [address], [place] [address] office: [phone] fax: [phone]           Physical Therapy: TREATMENT/PROGRESS NOTE   Patient: Kenia Vieyra (38 y.o. female)   Examination Date: 2024   :  1985 MRN: [id number]   Visit #: 11   Insurance Allowable Auth Needed   BMN [x]Yes    []No    Insurance: Payor: [employer] / Plan: [employer] PLAN [place] / Product Type: *No Product type* /   Insurance ID: [id number] - (Medicaid Managed)  Secondary Insurance (if applicable):    Treatment Diagnosis:     1. Neck pain  M54.2          2. Chronic right-sided thoracic back pain  M54.6       G89.29          Medical Diagnosis:  Chronic thoracic back pain, unspecified back pain laterality [M54.6, G89.29]  Scoliosis of thoracic spine, unspecified scoliosis type [M41.9]  Cervicalgia [M54.2]  Radiculopathy, cervical region [M54.12]  Myalgia, other site [M79.18]  Other cervical disc displacement,    Referring Physician: Skyla Bran,*  PCP: Zoey Starr MD       Plan of care signed (Y/N): Y    Date of Patient follow up with Physician:      Progress Report/POC: NO  POC update due: (10 visits /OR AUTH LIMITS, whichever is less)  2024                                             Precautions/ Contra-indications:           Latex allergy:  NO  Pacemaker:    NO  Contraindications for Manipulation: None  Date of Surgery: n/a  Other:    Red Flags:  None    C-SSRS Triggered by Intake questionnaire:   [x] No, Questionnaire did not trigger screening.   [] Yes, Patient intake triggered further evaluation      [] C-SSRS Screening completed  [] PCP notified via Plan of Care  [] Emergency services notified     Preferred Language for Healthcare:   [x] English       [] other:    SUBJECTIVE EXAMINATION     Patient stated complaint:Pt reports since last visit she was a little flared up and sore with the rain.

## 2024-04-03 ENCOUNTER — HOSPITAL ENCOUNTER (OUTPATIENT)
Dept: PHYSICAL THERAPY | Age: 39
Setting detail: THERAPIES SERIES
Discharge: HOME | End: 2024-04-03
Payer: MEDICAID

## 2024-04-03 PROCEDURE — 97012 MECHANICAL TRACTION THERAPY: CPT

## 2024-04-03 PROCEDURE — 97140 MANUAL THERAPY 1/> REGIONS: CPT

## 2024-04-03 PROCEDURE — 97110 THERAPEUTIC EXERCISES: CPT

## 2024-04-03 NOTE — FLOWSHEET NOTE
[facility]- Outpatient Rehabilitation and Therapy [address], [place] [address] office: [phone] fax: [phone]           Physical Therapy: TREATMENT/PROGRESS NOTE   Patient: Kenia Vieyra (38 y.o. female)   Examination Date: 2024   :  1985 MRN: [id number]   Visit #: 12   Insurance Allowable Auth Needed   BMN [x]Yes    []No    Insurance: Payor: [employer] [address] / Plan: [employer] PLAN [place] / Product Type: *No Product type* /   Insurance ID: [id number] - (Medicaid Managed)  Secondary Insurance (if applicable):    Treatment Diagnosis:     1. Neck pain  M54.2          2. Chronic right-sided thoracic back pain  M54.6       G89.29          Medical Diagnosis:  Chronic thoracic back pain, unspecified back pain laterality [M54.6, G89.29]  Scoliosis of thoracic spine, unspecified scoliosis type [M41.9]  Cervicalgia [M54.2]  Radiculopathy, cervical region [M54.12]  Myalgia, other site [M79.18]  Other cervical disc displacement,    Referring Physician: Skyla Bran,*  PCP: Zoey Starr MD       Plan of care signed (Y/N): Y    Date of Patient follow up with Physician:      Progress Report/POC: NO  POC update due: (10 visits /OR AUTH LIMITS, whichever is less)  5/3/2024                                             Precautions/ Contra-indications:           Latex allergy:  NO  Pacemaker:    NO  Contraindications for Manipulation: None  Date of Surgery: n/a  Other:    Red Flags:  None    C-SSRS Triggered by Intake questionnaire:   [x] No, Questionnaire did not trigger screening.   [] Yes, Patient intake triggered further evaluation      [] C-SSRS Screening completed  [] PCP notified via Plan of Care  [] Emergency services notified     Preferred Language for Healthcare:   [x] English       [] other:    SUBJECTIVE EXAMINATION     Patient stated complaint:Pt reports she has been doing ok. Especially with the addition of traction, she

## 2024-04-08 ENCOUNTER — TELEMEDICINE (OUTPATIENT)
Age: 39
End: 2024-04-08
Payer: MEDICAID

## 2024-04-08 DIAGNOSIS — G47.09 OTHER INSOMNIA: ICD-10-CM

## 2024-04-08 DIAGNOSIS — M79.7 FIBROMYALGIA: Primary | ICD-10-CM

## 2024-04-08 DIAGNOSIS — E55.9 VITAMIN D DEFICIENCY: ICD-10-CM

## 2024-04-08 DIAGNOSIS — F41.0 GENERALIZED ANXIETY DISORDER WITH PANIC ATTACKS: ICD-10-CM

## 2024-04-08 DIAGNOSIS — F41.1 GENERALIZED ANXIETY DISORDER WITH PANIC ATTACKS: ICD-10-CM

## 2024-04-08 DIAGNOSIS — F33.1 MDD (MAJOR DEPRESSIVE DISORDER), RECURRENT EPISODE, MODERATE (HCC): ICD-10-CM

## 2024-04-08 PROCEDURE — 99214 OFFICE O/P EST MOD 30 MIN: CPT | Performed by: FAMILY MEDICINE

## 2024-04-08 PROCEDURE — G8427 DOCREV CUR MEDS BY ELIG CLIN: HCPCS | Performed by: FAMILY MEDICINE

## 2024-04-08 PROCEDURE — G8419 CALC BMI OUT NRM PARAM NOF/U: HCPCS | Performed by: FAMILY MEDICINE

## 2024-04-08 PROCEDURE — 1036F TOBACCO NON-USER: CPT | Performed by: FAMILY MEDICINE

## 2024-04-08 RX ORDER — AMITRIPTYLINE HYDROCHLORIDE 10 MG/1
TABLET, FILM COATED ORAL
Qty: 90 TABLET | Refills: 1 | Status: SHIPPED | OUTPATIENT
Start: 2024-04-08

## 2024-04-08 ASSESSMENT — PATIENT HEALTH QUESTIONNAIRE - PHQ9
10. IF YOU CHECKED OFF ANY PROBLEMS, HOW DIFFICULT HAVE THESE PROBLEMS MADE IT FOR YOU TO DO YOUR WORK, TAKE CARE OF THINGS AT HOME, OR GET ALONG WITH OTHER PEOPLE: SOMEWHAT DIFFICULT
7. TROUBLE CONCENTRATING ON THINGS, SUCH AS READING THE NEWSPAPER OR WATCHING TELEVISION: SEVERAL DAYS
8. MOVING OR SPEAKING SO SLOWLY THAT OTHER PEOPLE COULD HAVE NOTICED. OR THE OPPOSITE, BEING SO FIGETY OR RESTLESS THAT YOU HAVE BEEN MOVING AROUND A LOT MORE THAN USUAL: SEVERAL DAYS
5. POOR APPETITE OR OVEREATING: NOT AT ALL
SUM OF ALL RESPONSES TO PHQ QUESTIONS 1-9: 6
6. FEELING BAD ABOUT YOURSELF - OR THAT YOU ARE A FAILURE OR HAVE LET YOURSELF OR YOUR FAMILY DOWN: NOT AT ALL
SUM OF ALL RESPONSES TO PHQ9 QUESTIONS 1 & 2: 0
SUM OF ALL RESPONSES TO PHQ QUESTIONS 1-9: 6
2. FEELING DOWN, DEPRESSED OR HOPELESS: NOT AT ALL
1. LITTLE INTEREST OR PLEASURE IN DOING THINGS: NOT AT ALL
9. THOUGHTS THAT YOU WOULD BE BETTER OFF DEAD, OR OF HURTING YOURSELF: NOT AT ALL
4. FEELING TIRED OR HAVING LITTLE ENERGY: MORE THAN HALF THE DAYS
3. TROUBLE FALLING OR STAYING ASLEEP: MORE THAN HALF THE DAYS

## 2024-04-08 ASSESSMENT — ANXIETY QUESTIONNAIRES
3. WORRYING TOO MUCH ABOUT DIFFERENT THINGS: NOT AT ALL
5. BEING SO RESTLESS THAT IT IS HARD TO SIT STILL: SEVERAL DAYS
4. TROUBLE RELAXING: SEVERAL DAYS
2. NOT BEING ABLE TO STOP OR CONTROL WORRYING: NOT AT ALL
GAD7 TOTAL SCORE: 3
1. FEELING NERVOUS, ANXIOUS, OR ON EDGE: SEVERAL DAYS
6. BECOMING EASILY ANNOYED OR IRRITABLE: NOT AT ALL
7. FEELING AFRAID AS IF SOMETHING AWFUL MIGHT HAPPEN: NOT AT ALL
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
## 2024-04-10 ENCOUNTER — HOSPITAL ENCOUNTER (OUTPATIENT)
Dept: PHYSICAL THERAPY | Age: 39
Setting detail: THERAPIES SERIES
Discharge: HOME | End: 2024-04-10
Payer: MEDICAID

## 2024-04-10 PROCEDURE — 97140 MANUAL THERAPY 1/> REGIONS: CPT

## 2024-04-10 PROCEDURE — 97110 THERAPEUTIC EXERCISES: CPT

## 2024-04-10 PROCEDURE — 97012 MECHANICAL TRACTION THERAPY: CPT

## 2024-04-10 NOTE — FLOWSHEET NOTE
or surgery.  (22876) MANUAL THERAPY -  Manual therapy techniques, 1 or more regions, each 15 minutes (Mobilization/manipulation, manual lymphatic drainage, manual traction) for the purpose of modulating pain, promoting relaxation,  increasing ROM, reducing/eliminating soft tissue swelling/inflammation/restriction, improving soft tissue extensibility and allowing for proper ROM for normal function with self care, mobility, lifting and ambulation  (15966) MECHANICAL TRACTION      GOALS     Patient stated goal: decrease pain, sleep better  Status:  [] Progressing: [] Met: [] Not Met: [] Adjusted    Therapist goals for Patient:   Short Term Goals: To be achieved in: 2 weeks  Independent in HEP and progression per patient tolerance, in order to progress toward full function and prevent re-injury.    Status: [] Progressing: [] Met: [] Not Met: [] Adjusted  Patient will have a decrease in pain to 2/10 to help facilitate improvement in movement, function, and ADLs as indicated by functional deficits.   Status: [] Progressing: [] Met: [] Not Met: [] Adjusted    Long Term Goals: To be achieved in:  12  weeks  Disability index score of 15% or less for the Neck Disability Index to assist with return top prior level of function.   Status: [] Progressing: [] Met: [] Not Met: [] Adjusted  Improve ROM to WNL to allow for proper joint functioning as indicated by patients functional deficits.  Status: [] Progressing: [] Met: [] Not Met: [] Adjusted  Pt to improve strength to WFL of deep cervical flexors and scapular retractors to allow for proper muscle and joint use in functional mobility, ADLs and prior level of function   Status: [] Progressing: [] Met: [] Not Met: [] Adjusted  Patient will return to  Reaching activities and Dressing without increased symptoms or restriction to work towards return to prior level of function.        Status: [] Progressing: [] Met: [] Not Met: [] Adjusted  Patient will perform normal ADLs without

## 2024-04-15 ENCOUNTER — HOSPITAL ENCOUNTER (OUTPATIENT)
Dept: PHYSICAL THERAPY | Age: 39
Setting detail: THERAPIES SERIES
Discharge: HOME | End: 2024-04-15
Payer: MEDICAID

## 2024-04-15 PROCEDURE — 97140 MANUAL THERAPY 1/> REGIONS: CPT

## 2024-04-15 PROCEDURE — 97110 THERAPEUTIC EXERCISES: CPT

## 2024-04-15 PROCEDURE — 97012 MECHANICAL TRACTION THERAPY: CPT

## 2024-04-15 NOTE — FLOWSHEET NOTE
Status: [] Progressing: [] Met: [] Not Met: [] Adjusted    TREATMENT PLAN     Frequency/Duration: 1-2x/week for  12  weeks for the following treatment interventions:    Interventions:  [x] Therapeutic exercise including: strength training, ROM, including postural re-education.   [x] NMR activation and proprioception, including postural re-education.    [x] Manual therapy as indicated to include: PROM, Gr I-IV mobilizations, and STM  [x] Modalities as needed that may include: Cryotherapy, Electrical Stimulation, Thermal Agents, and Traction  [x] Patient education on joint protection, postural re-education, activity modification, progression of HEP.        [] Aquatic Therapy    Plan: Cont POC- Continue emphasis/focus on exercise progression, improving proper muscle recruitment and activation/motor control patterns, increasing ROM, and reduce/eliminate soft tissue swelling/inflammation/restriction. Next visit plan to progress weights, progress reps, and add new exercises     Electronically Signed by Kena Elkins PT  Date: 04/15/2024    Note: Portions of this note have been templated and/or copied from initial evaluation, reassessments and prior notes for documentation efficiency.

## 2024-04-17 ENCOUNTER — HOSPITAL ENCOUNTER (OUTPATIENT)
Dept: PHYSICAL THERAPY | Age: 39
Setting detail: THERAPIES SERIES
Discharge: HOME | End: 2024-04-17
Payer: MEDICAID

## 2024-04-17 PROCEDURE — 97012 MECHANICAL TRACTION THERAPY: CPT

## 2024-04-17 PROCEDURE — 97110 THERAPEUTIC EXERCISES: CPT

## 2024-04-17 PROCEDURE — 97140 MANUAL THERAPY 1/> REGIONS: CPT

## 2024-04-17 NOTE — FLOWSHEET NOTE
following either an injury or surgery.  (65649) MANUAL THERAPY -  Manual therapy techniques, 1 or more regions, each 15 minutes (Mobilization/manipulation, manual lymphatic drainage, manual traction) for the purpose of modulating pain, promoting relaxation,  increasing ROM, reducing/eliminating soft tissue swelling/inflammation/restriction, improving soft tissue extensibility and allowing for proper ROM for normal function with self care, mobility, lifting and ambulation  (76874) MECHANICAL TRACTION      GOALS     Patient stated goal: decrease pain, sleep better  Status:  [] Progressing: [] Met: [] Not Met: [] Adjusted    Therapist goals for Patient:   Short Term Goals: To be achieved in: 2 weeks  Independent in HEP and progression per patient tolerance, in order to progress toward full function and prevent re-injury.    Status: [] Progressing: [] Met: [] Not Met: [] Adjusted  Patient will have a decrease in pain to 2/10 to help facilitate improvement in movement, function, and ADLs as indicated by functional deficits.   Status: [] Progressing: [] Met: [] Not Met: [] Adjusted    Long Term Goals: To be achieved in:  12  weeks  Disability index score of 15% or less for the Neck Disability Index to assist with return top prior level of function.   Status: [] Progressing: [] Met: [] Not Met: [] Adjusted  Improve ROM to WNL to allow for proper joint functioning as indicated by patients functional deficits.  Status: [] Progressing: [] Met: [] Not Met: [] Adjusted  Pt to improve strength to WFL of deep cervical flexors and scapular retractors to allow for proper muscle and joint use in functional mobility, ADLs and prior level of function   Status: [] Progressing: [] Met: [] Not Met: [] Adjusted  Patient will return to  Reaching activities and Dressing without increased symptoms or restriction to work towards return to prior level of function.        Status: [] Progressing: [] Met: [] Not Met: [] Adjusted  Patient will

## 2024-04-18 ENCOUNTER — OFFICE VISIT (OUTPATIENT)
Age: 39
End: 2024-04-18

## 2024-04-18 DIAGNOSIS — F41.9 ANXIETY: Primary | ICD-10-CM

## 2024-04-18 NOTE — PROGRESS NOTES
Behavioral Health Consultation   Cinthia Aguiar, PhD  Clinical Psychologist  4/18/2024      Time spent with Patient: 31 minutes 2:31 -3:02  This is patient's 13 th  [facility] appointment.    Reason for Consult:   Chief Complaint   Patient presents with    Anxiety    Stress    Chronic Pain       Referring Provider: Zoey Starr MD    Subjective  PT Improvement Questions  How is (target problem) going for you? Same, better, or worse? better    What specifically has changed (if anything)?  Overcoming a flare up  , she is very affected by the weather re: pain. Continuing to identify next steps for [place] re: enrolling her in  for the fall.  - [employer]  - needs to identify the school and begin registration    Has anyone else noticed any change(s)?  If so, what? N/a    What do you think is causing the change or keeping it from getting worse? Continues to use range of coping strategies. It is challenging to tame flare-ups when pain is constant. Working to reinforce her attempts., is regulating emotions Working to recognize what she is able to do, even in the midst of brain fog    Plan Implementation Questions    Recommendations to Patient:   1. Consider moving wake-up time back an hour at a time to achieve 9:30 wake-up time   2. Continue with current coping strategies     What part were you able to do? Some of it  Did it help/what were the results?  Yes    FUNCTIONAL ANALYSIS (if applicable)     Risk Assessment: Patient is judged to be at  low risk for harm to self/others due to  no current thoughts/reports of suicide and homicide.     Objective  Level of distress: Low  Orientation: Person, Place, Time, and Situation  Appearance: Well-groomed, Awake, Alert, and Good eye contact  Other: mood is euthymic      4/18/24 PROMIS-10 Scores:     PROMIS Raw Score   WNL Symptoms /Impairment     Mild Moderate Severe   Global Physical Health 15-20 13-14 9-12 4-8   Global Mental Health 14-20 11-13 7-10 4-6     Assessment

## 2024-04-22 ENCOUNTER — APPOINTMENT (OUTPATIENT)
Dept: PHYSICAL THERAPY | Age: 39
End: 2024-04-22
Payer: MEDICAID

## 2024-04-24 ENCOUNTER — APPOINTMENT (OUTPATIENT)
Dept: PHYSICAL THERAPY | Age: 39
End: 2024-04-24
Payer: MEDICAID

## 2024-04-29 ENCOUNTER — HOSPITAL ENCOUNTER (OUTPATIENT)
Dept: PHYSICAL THERAPY | Age: 39
Setting detail: THERAPIES SERIES
Discharge: HOME | End: 2024-04-29
Payer: MEDICAID

## 2024-04-29 PROCEDURE — 97110 THERAPEUTIC EXERCISES: CPT

## 2024-04-29 PROCEDURE — 97012 MECHANICAL TRACTION THERAPY: CPT

## 2024-04-29 NOTE — FLOWSHEET NOTE
[facility]- Outpatient Rehabilitation and Therapy [address], [place] [address] office: [phone] fax: [phone]           Physical Therapy: TREATMENT/PROGRESS NOTE   Patient: Kenia Vieyra (38 y.o. female)   Examination Date: 2024   :  1985 MRN: [id number]   Visit #: 16   Insurance Allowable Auth Needed   BMN [x]Yes    []No    Insurance: Payor: [employer] [address] / Plan: [employer] [place] / Product Type: *No Product type* /   Insurance ID: [id number] - (Medicaid Managed)  Secondary Insurance (if applicable):    Treatment Diagnosis:     1. Neck pain  M54.2          2. Chronic right-sided thoracic back pain  M54.6       G89.29          Medical Diagnosis:  Chronic thoracic back pain, unspecified back pain laterality [M54.6, G89.29]  Scoliosis of thoracic spine, unspecified scoliosis type [M41.9]  Cervicalgia [M54.2]  Radiculopathy, cervical region [M54.12]  Myalgia, other site [M79.18]  Other cervical disc displacement,    Referring Physician: Skyla Bran,*  PCP: Zoey Starr MD       Plan of care signed (Y/N): Y    Date of Patient follow up with Physician:      Progress Report/POC: NO  POC update due: (10 visits /OR AUTH LIMITS, whichever is less)  2024                                             Precautions/ Contra-indications:           Latex allergy:  NO  Pacemaker:    NO  Contraindications for Manipulation: None  Date of Surgery: n/a  Other:    Red Flags:  None    C-SSRS Triggered by Intake questionnaire:   [x] No, Questionnaire did not trigger screening.   [] Yes, Patient intake triggered further evaluation      [] C-SSRS Screening completed  [] PCP notified via Plan of Care  [] Emergency services notified     Preferred Language for Healthcare:   [x] English       [] other:    SUBJECTIVE EXAMINATION     Patient stated complaint:Pt reports she continues to feel some help from PT however overall her body

## 2024-05-15 ENCOUNTER — HOSPITAL ENCOUNTER (OUTPATIENT)
Dept: PHYSICAL THERAPY | Age: 39
Setting detail: THERAPIES SERIES
Discharge: HOME | End: 2024-05-15
Payer: MEDICAID

## 2024-05-15 ENCOUNTER — TELEMEDICINE (OUTPATIENT)
Age: 39
End: 2024-05-15
Payer: MEDICAID

## 2024-05-15 DIAGNOSIS — M79.7 FIBROMYALGIA: ICD-10-CM

## 2024-05-15 DIAGNOSIS — E55.9 VITAMIN D DEFICIENCY: ICD-10-CM

## 2024-05-15 DIAGNOSIS — F33.1 MDD (MAJOR DEPRESSIVE DISORDER), RECURRENT EPISODE, MODERATE (HCC): ICD-10-CM

## 2024-05-15 DIAGNOSIS — G47.09 OTHER INSOMNIA: ICD-10-CM

## 2024-05-15 DIAGNOSIS — F41.1 GENERALIZED ANXIETY DISORDER WITH PANIC ATTACKS: ICD-10-CM

## 2024-05-15 DIAGNOSIS — M79.7 FIBROMYALGIA: Primary | ICD-10-CM

## 2024-05-15 DIAGNOSIS — F41.0 GENERALIZED ANXIETY DISORDER WITH PANIC ATTACKS: ICD-10-CM

## 2024-05-15 LAB
25(OH)D3 SERPL-MCNC: 37.2 NG/ML
TSH SERPL DL<=0.005 MIU/L-ACNC: 3.08 UIU/ML (ref 0.27–4.2)

## 2024-05-15 PROCEDURE — 99214 OFFICE O/P EST MOD 30 MIN: CPT | Performed by: FAMILY MEDICINE

## 2024-05-15 PROCEDURE — G8428 CUR MEDS NOT DOCUMENT: HCPCS | Performed by: FAMILY MEDICINE

## 2024-05-15 PROCEDURE — 97012 MECHANICAL TRACTION THERAPY: CPT

## 2024-05-15 PROCEDURE — 1036F TOBACCO NON-USER: CPT | Performed by: FAMILY MEDICINE

## 2024-05-15 PROCEDURE — 97110 THERAPEUTIC EXERCISES: CPT

## 2024-05-15 PROCEDURE — G8419 CALC BMI OUT NRM PARAM NOF/U: HCPCS | Performed by: FAMILY MEDICINE

## 2024-05-15 RX ORDER — GABAPENTIN 100 MG/1
100 CAPSULE ORAL 3 TIMES DAILY
Qty: 90 CAPSULE | Refills: 0 | Status: SHIPPED | OUTPATIENT
Start: 2024-05-15 | End: 2024-06-14

## 2024-05-15 RX ORDER — HYDROXYZINE HYDROCHLORIDE 25 MG/1
25 TABLET, FILM COATED ORAL EVERY 8 HOURS PRN
Qty: 90 TABLET | Refills: 1 | Status: SHIPPED | OUTPATIENT
Start: 2024-05-15

## 2024-05-15 RX ORDER — AMITRIPTYLINE HYDROCHLORIDE 10 MG/1
TABLET, FILM COATED ORAL
Qty: 90 TABLET | Refills: 1 | Status: SHIPPED | OUTPATIENT
Start: 2024-05-15

## 2024-05-15 NOTE — FLOWSHEET NOTE
Progressing: [] Met: [] Not Met: [] Adjusted    TREATMENT PLAN     Frequency/Duration: 1-2x/week for  12  weeks for the following treatment interventions:    Interventions:  [x] Therapeutic exercise including: strength training, ROM, including postural re-education.   [x] NMR activation and proprioception, including postural re-education.    [x] Manual therapy as indicated to include: PROM, Gr I-IV mobilizations, and STM  [x] Modalities as needed that may include: Cryotherapy, Electrical Stimulation, Thermal Agents, and Traction  [x] Patient education on joint protection, postural re-education, activity modification, progression of HEP.        [] Aquatic Therapy    Plan: Cont POC- Continue emphasis/focus on exercise progression, improving proper muscle recruitment and activation/motor control patterns, increasing ROM, and reduce/eliminate soft tissue swelling/inflammation/restriction. Next visit plan to add new exercises     Electronically Signed by Kena Elkins PT  Date: 05/15/2024    Note: Portions of this note have been templated and/or copied from initial evaluation, reassessments and prior notes for documentation efficiency.

## 2024-05-15 NOTE — PROGRESS NOTES
Outpatient Psychiatry Integrative Care  Follow-up Psychiatric Evaluation   [facility] Care [facility]     Patient name: Kenia Vieyra  YOB: 1985  MRN: [id number]  Payor: Payor: [employer] [facility] [address] / Plan: [employer] [facility] PLAN [place] / Product Type: *No Product type* /   Day of Service: 5/15/24    Kenia Vieyra, was evaluated through a synchronous (real-time) audio-video encounter. The patient (or guardian if applicable) is aware that this is a billable service, which includes applicable co-pays. This Virtual Visit was conducted with patient's (and/or legal guardian's) consent. Patient identification was verified, and a caregiver was present when appropriate.   The patient was located at Home: [address] [place] [address] [address]  [facility] [address]  Provider was located at Facility (Appt Dept): [address]  [place] [address]  Confirm you are appropriately licensed, registered, or certified to deliver care in the [facility] where the patient is located as indicated above. If you are not or unsure, please re-schedule the visit: Yes, I confirm.     On this date 4/8/2024 I have spent 60 minutes reviewing previous notes, test results and face to face (virtual) with the patient discussing the diagnosis and importance of compliance with the treatment plan as well as documenting on the day of the visit.    Referring Primary Care Clinician: Zoey Starr MD    Chief Complaint:     Chief Complaint   Patient presents with    Anxiety       Assessment and Plan:     Kenia Vieyra is a 38 y.o. Other female with a history of fibromyalgia and anxiety who presents to outpatient primary care psychiatry on 5/15/24 for follow-up psychiatric evaluation with chief complaint of anxiety and sleep.    DSM 5 Diagnosis:   1. Fibromyalgia  2. Generalized anxiety disorder with panic attacks  3. MDD (major depressive disorder), recurrent episode, moderate (HCC):  Improving: PHQ9/GAD7

## 2024-05-16 LAB
EST. AVERAGE GLUCOSE BLD GHB EST-MCNC: 91.1 MG/DL
HBA1C MFR BLD: 4.8 %

## 2024-05-20 ENCOUNTER — HOSPITAL ENCOUNTER (OUTPATIENT)
Dept: PHYSICAL THERAPY | Age: 39
Setting detail: THERAPIES SERIES
Discharge: HOME | End: 2024-05-20
Payer: MEDICAID

## 2024-05-20 PROCEDURE — 97012 MECHANICAL TRACTION THERAPY: CPT

## 2024-05-20 PROCEDURE — 97110 THERAPEUTIC EXERCISES: CPT

## 2024-05-20 NOTE — FLOWSHEET NOTE
Therapeutic exercise including: strength training, ROM, including postural re-education.   [x] NMR activation and proprioception, including postural re-education.    [x] Manual therapy as indicated to include: PROM, Gr I-IV mobilizations, and STM  [x] Modalities as needed that may include: Cryotherapy, Electrical Stimulation, Thermal Agents, and Traction  [x] Patient education on joint protection, postural re-education, activity modification, progression of HEP.        [] Aquatic Therapy    Plan: Cont POC- Continue emphasis/focus on exercise progression, improving proper muscle recruitment and activation/motor control patterns, increasing ROM, and reduce/eliminate soft tissue swelling/inflammation/restriction. Next visit plan to add new exercises     Electronically Signed by Kena Eklins, PT  Date: 05/20/2024    Note: Portions of this note have been templated and/or copied from initial evaluation, reassessments and prior notes for documentation efficiency.

## 2024-05-22 ENCOUNTER — HOSPITAL ENCOUNTER (OUTPATIENT)
Dept: PHYSICAL THERAPY | Age: 39
Setting detail: THERAPIES SERIES
Discharge: HOME | End: 2024-05-22
Payer: MEDICAID

## 2024-05-22 PROCEDURE — 97110 THERAPEUTIC EXERCISES: CPT

## 2024-05-22 PROCEDURE — 97012 MECHANICAL TRACTION THERAPY: CPT

## 2024-05-22 NOTE — FLOWSHEET NOTE
[facility]- Outpatient Rehabilitation and Therapy [address], [place] [address] office: [phone] fax: [phone]           Physical Therapy: TREATMENT/PROGRESS NOTE   Patient: Kenia Vieyra (38 y.o. female)   Examination Date: 2024   :  1985 MRN: [id number]   Visit #: 19   Insurance Allowable Auth Needed   16 visits ( - 6/10) [x]Yes    []No    Insurance: Payor: [employer] / Plan: [employer] [place] / Product Type: *No Product type* /   Insurance ID: [id number] - (Medicaid Managed)  Secondary Insurance (if applicable):    Treatment Diagnosis:     1. Neck pain  M54.2          2. Chronic right-sided thoracic back pain  M54.6       G89.29          Medical Diagnosis:  Chronic thoracic back pain, unspecified back pain laterality [M54.6, G89.29]  Scoliosis of thoracic spine, unspecified scoliosis type [M41.9]  Cervicalgia [M54.2]  Radiculopathy, cervical region [M54.12]  Myalgia, other site [M79.18]  Other cervical disc displacement,    Referring Physician: Skyla Bran,*  PCP: Zoey Starr MD       Plan of care signed (Y/N): Y    Date of Patient follow up with Physician:      Progress Report/POC: NO  POC update due: (10 visits /OR AUTH LIMITS, whichever is less)  2024                                             Precautions/ Contra-indications:           Latex allergy:  NO  Pacemaker:    NO  Contraindications for Manipulation: None  Date of Surgery: n/a  Other:    Red Flags:  None    C-SSRS Triggered by Intake questionnaire:   [x] No, Questionnaire did not trigger screening.   [] Yes, Patient intake triggered further evaluation      [] C-SSRS Screening completed  [] PCP notified via Plan of Care  [] Emergency services notified     Preferred Language for Healthcare:   [x] English       [] other:    SUBJECTIVE EXAMINATION     Patient stated complaint: Pt reports she recovered from last session well and only

## 2024-05-29 ENCOUNTER — HOSPITAL ENCOUNTER (OUTPATIENT)
Dept: PHYSICAL THERAPY | Age: 39
Setting detail: THERAPIES SERIES
Discharge: HOME | End: 2024-05-29
Payer: MEDICAID

## 2024-05-29 PROCEDURE — 97140 MANUAL THERAPY 1/> REGIONS: CPT

## 2024-05-29 PROCEDURE — 97012 MECHANICAL TRACTION THERAPY: CPT

## 2024-05-29 PROCEDURE — 97110 THERAPEUTIC EXERCISES: CPT

## 2024-05-29 NOTE — FLOWSHEET NOTE
[facility]- Outpatient Rehabilitation and Therapy [address], [place] [address] office: [phone] fax: [phone]           Physical Therapy: TREATMENT/PROGRESS NOTE   Patient: Kenia Vieyra (38 y.o. female)   Examination Date: 2024   :  1985 MRN: [id number]   Visit #: Visit count could not be calculated. Make sure you are using a visit which is associated with an episode.   Insurance Allowable Auth Needed   16 visits ( - 6/10) [x]Yes    []No    Insurance: Payor: [employer] / Plan: [employer] [place]AN [place] / Product Type: *No Product type* /   Insurance ID: [id number] - (Medicaid Managed)  Secondary Insurance (if applicable):    Treatment Diagnosis:     1. Neck pain  M54.2          2. Chronic right-sided thoracic back pain  M54.6       G89.29          Medical Diagnosis:  Chronic thoracic back pain, unspecified back pain laterality [M54.6, G89.29]  Scoliosis of thoracic spine, unspecified scoliosis type [M41.9]  Cervicalgia [M54.2]  Radiculopathy, cervical region [M54.12]  Myalgia, other site [M79.18]  Other cervical disc displacement,    Referring Physician: Skyla Bran,*  PCP: Zoey Starr MD       Plan of care signed (Y/N): Y    Date of Patient follow up with Physician:      Progress Report/POC: NO  POC update due: (10 visits /OR AUTH LIMITS, whichever is less)  2024                                             Precautions/ Contra-indications:           Latex allergy:  NO  Pacemaker:    NO  Contraindications for Manipulation: None  Date of Surgery: n/a  Other:    Red Flags:  None    C-SSRS Triggered by Intake questionnaire:   [x] No, Questionnaire did not trigger screening.   [] Yes, Patient intake triggered further evaluation      [] C-SSRS Screening completed  [] PCP notified via Plan of Care  [] Emergency services notified     Preferred Language for Healthcare:   [x] English       []

## 2024-06-03 ENCOUNTER — HOSPITAL ENCOUNTER (OUTPATIENT)
Dept: PHYSICAL THERAPY | Age: 39
Setting detail: THERAPIES SERIES
Discharge: HOME | End: 2024-06-03
Payer: MEDICAID

## 2024-06-03 PROCEDURE — 97140 MANUAL THERAPY 1/> REGIONS: CPT

## 2024-06-03 PROCEDURE — 97012 MECHANICAL TRACTION THERAPY: CPT

## 2024-06-03 PROCEDURE — 97110 THERAPEUTIC EXERCISES: CPT

## 2024-06-03 NOTE — FLOWSHEET NOTE
[facility]- Outpatient Rehabilitation and Therapy [address], [place] [address] office: [phone] fax: [phone]           Physical Therapy: TREATMENT/PROGRESS NOTE   Patient: Kenia Vieyra (38 y.o. female)   Examination Date: 2024   :  1985 MRN: [id number]   Visit #: 21   Insurance Allowable Auth Needed   16 visits ( - 6/10) [x]Yes    []No    Insurance: Payor: [employer] / Plan: [employer] [place] / Product Type: *No Product type* /   Insurance ID: [id number] - (Medicaid Managed)  Secondary Insurance (if applicable):    Treatment Diagnosis:     1. Neck pain  M54.2          2. Chronic right-sided thoracic back pain  M54.6       G89.29          Medical Diagnosis:  Chronic thoracic back pain, unspecified back pain laterality [M54.6, G89.29]  Scoliosis of thoracic spine, unspecified scoliosis type [M41.9]  Cervicalgia [M54.2]  Radiculopathy, cervical region [M54.12]  Myalgia, other site [M79.18]  Other cervical disc displacement,    Referring Physician: Skyla Bran,*  PCP: Zoey Starr MD       Plan of care signed (Y/N): Y    Date of Patient follow up with Physician:      Progress Report/POC: YES, Date Range for this report:  to   POC update due: (10 visits /OR AUTH LIMITS, whichever is less)  2024                                             Precautions/ Contra-indications:           Latex allergy:  NO  Pacemaker:    NO  Contraindications for Manipulation: None  Date of Surgery: n/a  Other:    Red Flags:  None    C-SSRS Triggered by Intake questionnaire:   [x] No, Questionnaire did not trigger screening.   [] Yes, Patient intake triggered further evaluation      [] C-SSRS Screening completed  [] PCP notified via Plan of Care  [] Emergency services notified     Preferred Language for Healthcare:   [x] English       [] other:    SUBJECTIVE EXAMINATION     Patient stated complaint: Pt reports she

## 2024-06-05 ENCOUNTER — APPOINTMENT (OUTPATIENT)
Dept: PHYSICAL THERAPY | Age: 39
End: 2024-06-05
Payer: MEDICAID

## 2024-06-10 ENCOUNTER — HOSPITAL ENCOUNTER (OUTPATIENT)
Dept: PHYSICAL THERAPY | Age: 39
Setting detail: THERAPIES SERIES
Discharge: HOME | End: 2024-06-10
Payer: MEDICAID

## 2024-06-10 PROCEDURE — 97110 THERAPEUTIC EXERCISES: CPT

## 2024-06-10 PROCEDURE — 97012 MECHANICAL TRACTION THERAPY: CPT

## 2024-06-10 NOTE — FLOWSHEET NOTE
[] Not Met: [] Adjusted  Patient will perform normal ADLs without symptoms 75% of the time             Status: [x] Progressing: [] Met: [] Not Met: [] Adjusted    TREATMENT PLAN     Frequency/Duration: 1-2x/week for  12  weeks for the following treatment interventions:    Interventions:  [x] Therapeutic exercise including: strength training, ROM, including postural re-education.   [x] NMR activation and proprioception, including postural re-education.    [x] Manual therapy as indicated to include: PROM, Gr I-IV mobilizations, and STM  [x] Modalities as needed that may include: Cryotherapy, Electrical Stimulation, Thermal Agents, and Traction  [x] Patient education on joint protection, postural re-education, activity modification, progression of HEP.        [] Aquatic Therapy    Plan: Cont POC- Continue emphasis/focus on exercise progression, improving proper muscle recruitment and activation/motor control patterns, modulating pain, and promoting relaxation. Next visit plan to progress weights, progress reps, and add new exercises     Electronically Signed by Kena Elkins, PT  Date: 06/10/2024    Note: Portions of this note have been templated and/or copied from initial evaluation, reassessments and prior notes for documentation efficiency.

## 2024-06-12 ENCOUNTER — APPOINTMENT (OUTPATIENT)
Dept: PHYSICAL THERAPY | Age: 39
End: 2024-06-12
Payer: MEDICAID

## 2024-06-17 ENCOUNTER — HOSPITAL ENCOUNTER (OUTPATIENT)
Dept: PHYSICAL THERAPY | Age: 39
Setting detail: THERAPIES SERIES
Discharge: HOME | End: 2024-06-17
Payer: MEDICAID

## 2024-06-17 PROCEDURE — 97140 MANUAL THERAPY 1/> REGIONS: CPT

## 2024-06-17 PROCEDURE — 97110 THERAPEUTIC EXERCISES: CPT

## 2024-06-17 PROCEDURE — 97012 MECHANICAL TRACTION THERAPY: CPT

## 2024-06-17 NOTE — FLOWSHEET NOTE
[facility]- Outpatient Rehabilitation and Therapy [address], [place] [address] office: [phone] fax: [phone]           Physical Therapy: TREATMENT/PROGRESS NOTE   Patient: Kenia Vieyra (38 y.o. female)   Examination Date: 2024   :  1985 MRN: [id number]   Visit #: 23 total  Insurance Allowable Auth Needed   16 visits ( - ) [x]Yes    []No    Insurance: Payor: [employer] [address] / Plan: [employer] [place] / Product Type: *No Product type* /   Insurance ID: [id number] - (Medicaid Managed)  Secondary Insurance (if applicable):    Treatment Diagnosis:     1. Neck pain  M54.2          2. Chronic right-sided thoracic back pain  M54.6       G89.29          Medical Diagnosis:  Chronic thoracic back pain, unspecified back pain laterality [M54.6, G89.29]  Scoliosis of thoracic spine, unspecified scoliosis type [M41.9]  Cervicalgia [M54.2]  Radiculopathy, cervical region [M54.12]  Myalgia, other site [M79.18]  Other cervical disc displacement,    Referring Physician: Skyla Bran,*  PCP: Zoey Starr MD       Plan of care signed (Y/N): Y    Date of Patient follow up with Physician:      Progress Report/POC: NO  POC update due: (10 visits /OR AUTH LIMITS, whichever is less)  2024                                             Precautions/ Contra-indications:           Latex allergy:  NO  Pacemaker:    NO  Contraindications for Manipulation: None  Date of Surgery: n/a  Other:    Red Flags:  None    C-SSRS Triggered by Intake questionnaire:   [x] No, Questionnaire did not trigger screening.   [] Yes, Patient intake triggered further evaluation      [] C-SSRS Screening completed  [] PCP notified via Plan of Care  [] Emergency services notified     Preferred Language for Healthcare:   [x] English       [] other:    SUBJECTIVE EXAMINATION     Patient stated complaint: Pt reports she had a flare up last Thursday of R sided

## 2024-06-19 ENCOUNTER — APPOINTMENT (OUTPATIENT)
Dept: PHYSICAL THERAPY | Age: 39
End: 2024-06-19
Payer: MEDICAID

## 2024-06-24 ENCOUNTER — APPOINTMENT (OUTPATIENT)
Dept: PHYSICAL THERAPY | Age: 39
End: 2024-06-24
Payer: MEDICAID

## 2024-06-25 DIAGNOSIS — M79.18 MYOFASCIAL PAIN: ICD-10-CM

## 2024-06-25 DIAGNOSIS — M54.12 CERVICAL RADICULITIS: ICD-10-CM

## 2024-06-25 DIAGNOSIS — G89.29 CHRONIC NECK PAIN: ICD-10-CM

## 2024-06-25 DIAGNOSIS — M54.6 CHRONIC THORACIC BACK PAIN, UNSPECIFIED BACK PAIN LATERALITY: ICD-10-CM

## 2024-06-25 DIAGNOSIS — M41.9 SCOLIOSIS OF THORACIC SPINE, UNSPECIFIED SCOLIOSIS TYPE: ICD-10-CM

## 2024-06-25 DIAGNOSIS — G89.29 CHRONIC THORACIC BACK PAIN, UNSPECIFIED BACK PAIN LATERALITY: ICD-10-CM

## 2024-06-25 DIAGNOSIS — M54.2 CHRONIC NECK PAIN: ICD-10-CM

## 2024-06-26 ENCOUNTER — APPOINTMENT (OUTPATIENT)
Dept: PHYSICAL THERAPY | Age: 39
End: 2024-06-26
Payer: MEDICAID

## 2024-06-26 ENCOUNTER — TELEMEDICINE (OUTPATIENT)
Age: 39
End: 2024-06-26

## 2024-06-26 DIAGNOSIS — F41.0 GENERALIZED ANXIETY DISORDER WITH PANIC ATTACKS: Primary | ICD-10-CM

## 2024-06-26 DIAGNOSIS — M54.9 OTHER CHRONIC BACK PAIN: ICD-10-CM

## 2024-06-26 DIAGNOSIS — F41.1 GENERALIZED ANXIETY DISORDER WITH PANIC ATTACKS: Primary | ICD-10-CM

## 2024-06-26 DIAGNOSIS — G89.29 OTHER CHRONIC BACK PAIN: ICD-10-CM

## 2024-06-26 DIAGNOSIS — F33.1 MDD (MAJOR DEPRESSIVE DISORDER), RECURRENT EPISODE, MODERATE (HCC): ICD-10-CM

## 2024-06-26 DIAGNOSIS — G47.09 OTHER INSOMNIA: ICD-10-CM

## 2024-06-26 DIAGNOSIS — M79.7 FIBROMYALGIA: ICD-10-CM

## 2024-06-26 RX ORDER — HYDROXYZINE HYDROCHLORIDE 25 MG/1
12.5-25 TABLET, FILM COATED ORAL EVERY 8 HOURS PRN
Qty: 90 TABLET | Refills: 1 | Status: SHIPPED | OUTPATIENT
Start: 2024-06-26

## 2024-06-26 RX ORDER — GABAPENTIN 100 MG/1
100 CAPSULE ORAL 3 TIMES DAILY
Qty: 90 CAPSULE | Refills: 0 | Status: SHIPPED | OUTPATIENT
Start: 2024-06-26 | End: 2024-07-26

## 2024-06-26 RX ORDER — AMITRIPTYLINE HYDROCHLORIDE 10 MG/1
TABLET, FILM COATED ORAL
Qty: 90 TABLET | Refills: 1 | Status: SHIPPED | OUTPATIENT
Start: 2024-06-26

## 2024-06-26 RX ORDER — DICLOFENAC SODIUM 75 MG/1
75 TABLET, DELAYED RELEASE ORAL 2 TIMES DAILY PRN
Qty: 60 TABLET | Refills: 0 | Status: SHIPPED | OUTPATIENT
Start: 2024-06-26 | End: 2024-07-26
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
## 2024-06-26 NOTE — TELEPHONE ENCOUNTER
Patient last seen 1/16/2024 and medication last filled 9/26/2023:     Impression:  1) Chronic neck pain, right cervical radiculitis, contributing right occipital neuralgia  2) Right disc bulging C6-7, DDD  3) Chronic thoracic pain, mild scoliosis   4) Chronic low back pain  5) Fibro--seeing Rheumatology   6) Depression         Plan:   1) We had a long discussion.  We reviewed her recent cervical MRI scan today and discussed treatment options in detail.  2) PT for above with myofascial release and dry needling as this has been beneficial.  Also discussed aquatic therapy she states this was beneficial in the past  3) Referral to Dr. Montes or Karuna Rodriguez CNP for formal pain mgt consult  4) Cont Diclofenac 75mg I po BID PRN; DC other NSAIDs, side effect/risk discussed  5) Discussed concerning s/sx  6) SOURAV pamphlet provided.  Currently not interested in interventional procedures  7) F/u if no improvement               Skyla Bran PA-C, MPAS  Board Certified by the [facility]

## 2024-07-01 ENCOUNTER — HOSPITAL ENCOUNTER (OUTPATIENT)
Dept: PHYSICAL THERAPY | Age: 39
Setting detail: THERAPIES SERIES
Discharge: HOME | End: 2024-07-01
Payer: MEDICAID

## 2024-07-01 PROCEDURE — 97164 PT RE-EVAL EST PLAN CARE: CPT

## 2024-07-01 PROCEDURE — 97110 THERAPEUTIC EXERCISES: CPT

## 2024-07-08 ENCOUNTER — APPOINTMENT (OUTPATIENT)
Dept: PHYSICAL THERAPY | Age: 39
End: 2024-07-08
Payer: MEDICAID

## 2024-07-08 ENCOUNTER — TELEPHONE (OUTPATIENT)
Age: 39
End: 2024-07-08

## 2024-07-08 ENCOUNTER — HOSPITAL ENCOUNTER (OUTPATIENT)
Dept: PHYSICAL THERAPY | Age: 39
Setting detail: THERAPIES SERIES
Discharge: HOME | End: 2024-07-08
Payer: MEDICAID

## 2024-07-08 PROCEDURE — 97012 MECHANICAL TRACTION THERAPY: CPT

## 2024-07-08 PROCEDURE — 97110 THERAPEUTIC EXERCISES: CPT

## 2024-07-08 NOTE — TELEPHONE ENCOUNTER
Left message for pt, requesting she contact the office to schedule with [facility].

## 2024-07-15 ENCOUNTER — HOSPITAL ENCOUNTER (OUTPATIENT)
Dept: PHYSICAL THERAPY | Age: 39
Setting detail: THERAPIES SERIES
Discharge: HOME | End: 2024-07-15
Payer: MEDICAID

## 2024-07-15 PROCEDURE — 97110 THERAPEUTIC EXERCISES: CPT

## 2024-07-15 PROCEDURE — 97012 MECHANICAL TRACTION THERAPY: CPT

## 2024-07-15 NOTE — FLOWSHEET NOTE
an object from the floor, light home activities, walk 1 mile, and stand for length of time without increased symptoms or restriction to work towards return to prior level of function.  Status: [x] Progressing: [] Met: [] Not Met: [] Adjusted  Patient will perform normal ADLs without symptoms 50% of the time                                                                                                                  Status: [x] Progressing: [] Met: [] Not Met: []      Overall Progression Towards Functional goals/ Treatment Progress Update:  [] Patient is progressing as expected towards functional goals listed.    [] Progression is slowed due to complexities/Impairments listed.  [] Progression has been slowed due to co-morbidities.  [x] Plan just implemented, too soon (<30days) to assess goals progression   [] Goals require adjustment due to lack of progress  [] Patient is not progressing as expected and requires additional follow up with physician  [] Other:     CHARGE CAPTURE     PT CHARGE GRID   CPT Code (TIMED) minutes # CPT Code (UNTIMED) #     Therex (97861)  25 2  EVAL:LOW (62865 - Typically 20 minutes face-to-face)     Neuromusc. Re-ed (40630)    Re-Eval (57978)     Manual (03755)    Estim Unattended (18104)     Ther. Act (04185)    Mech. Traction (69462) 1    Gait (64982)    Dry Needle 1-2 muscle (07672)     Aquatic Therex (11078)    Dry Needle 3+ muscle (20561)     Iontophoresis (12401)    VASO (88274)     Ultrasound (35472)    Group Therapy (16928)     Estim Attended (71703)    Canalith Repositioning (71822)     Other:    Other:    Total Timed Code Tx Minutes 25 2       Total Treatment Minutes 40        Charge Justification:  (01692) THERAPEUTIC EXERCISE - Provided verbal/tactile cueing for activities related to strengthening, flexibility, endurance, ROM performed to prevent loss of range of motion, maintain or improve muscular strength or increase flexibility, following either an injury or surgery.

## 2024-07-20 RX ORDER — FLUTICASONE PROPIONATE 50 MCG
SPRAY, SUSPENSION (ML) NASAL
Qty: 16 G | OUTPATIENT
Start: 2024-07-20

## 2024-07-22 ENCOUNTER — HOSPITAL ENCOUNTER (OUTPATIENT)
Dept: PHYSICAL THERAPY | Age: 39
Setting detail: THERAPIES SERIES
Discharge: HOME | End: 2024-07-22
Payer: MEDICAID

## 2024-07-22 PROCEDURE — 97110 THERAPEUTIC EXERCISES: CPT

## 2024-07-22 PROCEDURE — 97012 MECHANICAL TRACTION THERAPY: CPT

## 2024-07-22 NOTE — FLOWSHEET NOTE
(34078)     Estim Attended (67279)    Canalith Repositioning (66722)     Other:    Other:    Total Timed Code Tx Minutes 25 2       Total Treatment Minutes 40        Charge Justification:  (62707) THERAPEUTIC EXERCISE - Provided verbal/tactile cueing for activities related to strengthening, flexibility, endurance, ROM performed to prevent loss of range of motion, maintain or improve muscular strength or increase flexibility, following either an injury or surgery.   (24781) NEUROMUSCULAR RE-EDUCATION - Therapeutic procedure, 1 or more areas, each 15 minutes; neuromuscular reeducation of movement, balance, coordination, kinesthetic sense, posture, and/or proprioception for sitting and/or standing activities  (53417) HOME EXERCISE PROGRAM - Reviewed/Progressed HEP activities related to neuromuscular reeducation of movement, balance, coordination, kinesthetic sense, posture, and/or proprioception for sitting and/or standing activities    (83431) MECHANICAL TRACTION    TREATMENT PLAN   Plan: Cont POC- Continue emphasis/focus on exercise progression, improving proper muscle recruitment and activation/motor control patterns, modulating pain, and promoting relaxation. Next visit plan to progress weights, progress reps, and add new exercises     Electronically Signed by Kena Elkins PT              Date: 07/22/2024     Note: If patient does not return for scheduled/recommended follow up visits, this note will serve as a discharge from care along with the most recent update on progress.

## 2024-07-24 ENCOUNTER — OFFICE VISIT (OUTPATIENT)
Dept: FAMILY MEDICINE CLINIC | Age: 39
End: 2024-07-24
Payer: MEDICAID

## 2024-07-24 VITALS
SYSTOLIC BLOOD PRESSURE: 124 MMHG | DIASTOLIC BLOOD PRESSURE: 82 MMHG | HEART RATE: 86 BPM | HEIGHT: 60 IN | RESPIRATION RATE: 16 BRPM | BODY MASS INDEX: 28.66 KG/M2 | OXYGEN SATURATION: 100 % | WEIGHT: 146 LBS | TEMPERATURE: 97.7 F

## 2024-07-24 DIAGNOSIS — Z23 NEED FOR HEPATITIS B VACCINATION: ICD-10-CM

## 2024-07-24 DIAGNOSIS — Z00.00 ENCOUNTER FOR WELL ADULT EXAM WITHOUT ABNORMAL FINDINGS: Primary | ICD-10-CM

## 2024-07-24 PROCEDURE — 3074F SYST BP LT 130 MM HG: CPT | Performed by: FAMILY MEDICINE

## 2024-07-24 PROCEDURE — 99396 PREV VISIT EST AGE 40-64: CPT | Performed by: FAMILY MEDICINE

## 2024-07-24 PROCEDURE — 3079F DIAST BP 80-89 MM HG: CPT | Performed by: FAMILY MEDICINE

## 2024-07-24 NOTE — PROGRESS NOTES
Well Adult Note  Name: Kenia Vieyra Today’s Date: 2024   MRN: [id number] Sex: Female   Age: 38 y.o. Ethnicity:  /    : 1985 Race: Other      Kenia Vieyra is here for a well adult exam.       Assessment & Plan      Diagnosis Orders   1. Encounter for well adult exam without abnormal findings  Lipid Panel      2. Need for hepatitis B vaccination  Hep B, HEPLISAV-B, (age 18 yrs+), IM, 0.5mL, 2-Dose        Well adult:    . Doing well, encourage healthy diet, exercise, safety    . Screening labs orders given   . Preventive: hep B #1    . Mammogram scheduled       Her chronic medical conditions are stable, she will consult w dietitian too.            Subjective   History:  Well Adult Physical   Patient here for a comprehensive physical exam.The patient reports problems -   Chronic pian, fibromyalgia, IBS  Working with her psychologist and psychiatrist  Very compliant with diet/exercise     Do you take any herbs or supplements that were not prescribed by a doctor? yes Are you taking calcium supplements? no Are you taking aspirin daily? not applicable   History:  Pap utd        Review of Systems   Constitutional:  Negative for activity change, appetite change, fatigue, fever and unexpected weight change.   HENT:  Negative for congestion, postnasal drip, rhinorrhea and trouble swallowing.    Eyes:  Negative for redness and itching.   Respiratory:  Negative for chest tightness, shortness of breath and wheezing.    Cardiovascular:  Negative for chest pain and palpitations.   Gastrointestinal:  Negative for abdominal pain, nausea and vomiting.   Genitourinary:  Negative for dysuria and urgency.   Musculoskeletal:  Positive for myalgias. Negative for arthralgias, joint swelling and neck pain.   Skin:  Negative for color change and rash.   Neurological:  Negative for light-headedness and headaches.   Hematological:  Negative for adenopathy.   Psychiatric/Behavioral:  Positive for dysphoric mood

## 2024-07-25 ENCOUNTER — HOSPITAL ENCOUNTER (OUTPATIENT)
Dept: MAMMOGRAPHY | Age: 39
Discharge: HOME | End: 2024-07-25
Payer: MEDICAID

## 2024-07-25 DIAGNOSIS — Z00.00 ENCOUNTER FOR WELL ADULT EXAM WITHOUT ABNORMAL FINDINGS: ICD-10-CM

## 2024-07-25 DIAGNOSIS — Z12.31 ENCOUNTER FOR SCREENING MAMMOGRAM FOR BREAST CANCER: ICD-10-CM

## 2024-07-25 LAB
CHOLEST SERPL-MCNC: 150 MG/DL (ref 0–199)
HDLC SERPL-MCNC: 60 MG/DL (ref 40–60)
LDLC SERPL CALC-MCNC: 81 MG/DL
TRIGL SERPL-MCNC: 45 MG/DL (ref 0–150)
VLDLC SERPL CALC-MCNC: 9 MG/DL

## 2024-07-25 PROCEDURE — 90471 IMMUNIZATION ADMIN: CPT | Performed by: FAMILY MEDICINE

## 2024-07-25 PROCEDURE — 77063 BREAST TOMOSYNTHESIS BI: CPT

## 2024-07-25 PROCEDURE — 90739 HEPB VACC 2/4 DOSE ADULT IM: CPT | Performed by: FAMILY MEDICINE

## 2024-07-25 ASSESSMENT — ENCOUNTER SYMPTOMS
VOMITING: 0
EYE REDNESS: 0
COLOR CHANGE: 0
TROUBLE SWALLOWING: 0
RHINORRHEA: 0
ABDOMINAL PAIN: 0
EYE ITCHING: 0
NAUSEA: 0
CHEST TIGHTNESS: 0
WHEEZING: 0
SHORTNESS OF BREATH: 0

## 2024-07-29 ENCOUNTER — HOSPITAL ENCOUNTER (OUTPATIENT)
Dept: PHYSICAL THERAPY | Age: 39
Setting detail: THERAPIES SERIES
Discharge: HOME | End: 2024-07-29
Payer: MEDICAID

## 2024-07-29 PROCEDURE — 97110 THERAPEUTIC EXERCISES: CPT

## 2024-07-29 PROCEDURE — 97012 MECHANICAL TRACTION THERAPY: CPT

## 2024-07-29 NOTE — FLOWSHEET NOTE
[facility] [place]- Outpatient Rehabilitation and Therapy [address], [place] [address] office: [phone] fax: [phone]    Physical Therapy: TREATMENT/PROGRESS NOTE   Patient: Kenia Vieyra (38 y.o. female)   Treatment Date: 2024   :  1985 MRN: [id number]   Visit #: 4  Insurance Allowable Auth Needed **    16 visits ( - ) [x]Yes    []No   No visits approved yet however pt opted to continue with treatment this visit Insurance: Payor: [employer] [place] / Plan: [employer] [place] / Product Type: *No Product type* /   Insurance ID: [id number] - (Medicaid Managed)  Secondary Insurance (if applicable):    Treatment Diagnosis:     ICD-10-CM    1. Chronic bilateral low back pain, unspecified whether sciatica present  M54.50     G89.29          Medical Diagnosis:    Chronic thoracic back pain, unspecified back pain laterality [M54.6, G89.29]  Scoliosis of thoracic spine, unspecified scoliosis type [M41.9]   Referring Physician: Skyla Bran,*  PCP: Zoey Starr MD                             Plan of care signed (Y/N): Y    Date of Patient follow up with Physician: prn     Progress Report/POC: NO   POC update due: (10 visits /OR AUTH LIMITS, whichever is less)  24    Precautions/ Contra-indications:                                                                                          Latex allergy:  NO  Pacemaker:    NO  Contraindications for Manipulation: None  Date of Surgery: n/a  Other:     Preferred Language for Healthcare:   [x]English       []other:    SUBJECTIVE EXAMINATION     Patient Report/Comments: Pt reports she is feeling anxious today because she is here much earlier than her usual appt. Pt has difficulty waking up in the morning due to poor sleep quality. Pt has noted more throbbing and soreness the past few days. Pain is in R low back, glute and into knee primarily but does still feel symptoms into

## 2024-08-01 ENCOUNTER — OFFICE VISIT (OUTPATIENT)
Age: 39
End: 2024-08-01

## 2024-08-01 DIAGNOSIS — F41.9 ANXIETY: Primary | ICD-10-CM

## 2024-08-01 ASSESSMENT — PROMIS GLOBAL HEALTH SCALE
IN GENERAL, HOW WOULD YOU RATE YOUR MENTAL HEALTH, INCLUDING YOUR MOOD AND YOUR ABILITY TO THINK [ON A SCALE OF 1 (POOR) TO 5 (EXCELLENT)]?: GOOD
SUM OF RESPONSES TO QUESTIONS 3, 6, 7, & 8: 11
IN GENERAL, HOW WOULD YOU RATE YOUR PHYSICAL HEALTH [ON A SCALE OF 1 (POOR) TO 5 (EXCELLENT)]?: FAIR
IN GENERAL, PLEASE RATE HOW WELL YOU CARRY OUT YOUR USUAL SOCIAL ACTIVITIES (INCLUDES ACTIVITIES AT HOME, AT WORK, AND IN YOUR COMMUNITY, AND RESPONSIBILITIES AS A PARENT, CHILD, SPOUSE, EMPLOYEE, FRIEND, ETC) [ON A SCALE OF 1 (POOR) TO 5 (EXCELLENT)]?: GOOD
TO WHAT EXTENT ARE YOU ABLE TO CARRY OUT YOUR EVERYDAY PHYSICAL ACTIVITIES SUCH AS WALKING, CLIMBING STAIRS, CARRYING GROCERIES, OR MOVING A CHAIR [ON A SCALE OF 1 (NOT AT ALL) TO 5 (COMPLETELY)]?: MODERATELY
SUM OF RESPONSES TO QUESTIONS 2, 4, 5, & 10: 11
IN GENERAL, WOULD YOU SAY YOUR HEALTH IS...[ON A SCALE OF 1 (POOR) TO 5 (EXCELLENT)]: GOOD
IN THE PAST 7 DAYS, HOW WOULD YOU RATE YOUR FATIGUE ON AVERAGE [ON A SCALE FROM 1 (NONE) TO 5 (VERY SEVERE)]?: MODERATE
IN GENERAL, HOW WOULD YOU RATE YOUR SATISFACTION WITH YOUR SOCIAL ACTIVITIES AND RELATIONSHIPS [ON A SCALE OF 1 (POOR) TO 5 (EXCELLENT)]?: FAIR
IN GENERAL, WOULD YOU SAY YOUR QUALITY OF LIFE IS...[ON A SCALE OF 1 (POOR) TO 5 (EXCELLENT)]: GOOD
IN THE PAST 7 DAYS, HOW WOULD YOU RATE YOUR PAIN ON AVERAGE [ON A SCALE FROM 0 (NO PAIN) TO 10 (WORST IMAGINABLE PAIN)]?: 3
IN THE PAST 7 DAYS, HOW OFTEN HAVE YOU BEEN BOTHERED BY EMOTIONAL PROBLEMS, SUCH AS FEELING ANXIOUS, DEPRESSED, OR IRRITABLE [ON A SCALE FROM 1 (NEVER) TO 5 (ALWAYS)]?: SOMETIMES

## 2024-08-01 NOTE — PROGRESS NOTES
Behavioral Health Consultation   Cinthia Aguiar, PhD  Clinical Psychologist  8/1/2024      Time spent with Patient: 42 minutes 3:35 -4:17  This is patient's 14 th  [facility] appointment.    Reason for Consult:   Chief Complaint   Patient presents with    Anxiety       Referring Provider: Zoey Starr MD    Subjective  PT Improvement Questions  How is (target problem) going for you? Same, better, or worse? same    What specifically has changed (if anything)?  Lilliana started pre-k this summer ,for 5-6 hours  going most days  , Kg starts 8/9 - will be half days, morning right now.     Has anyone else noticed any change(s)?  If so, what? N/a    What do you think is causing the change or keeping it from getting worse?  Continues to work diligently to better manage ongoing health issues - range of strategies, always building upon them as she gains better understanding of her health and ways of coping    Plan Implementation Questions    Recommendations to Patient:   1. Begin  registration process for [employer]    2. Continue with current coping strategies.     What part were you able to do? All of it  Did it help/what were the results?  Yes    FUNCTIONAL ANALYSIS (if applicable)     Risk Assessment: Patient is judged to be at  low risk for harm to self/others due to  no current thoughts/reports of suicide and homicide.     Objective  Level of distress: Low  Orientation: Person, Place, Time, and Situation  Appearance: Well-groomed, Awake, Alert, and Good eye contact  Other: mood is euthymic      8/1/24 PROMIS-10 Scores: Physical: 11 Mental: 11    PROMIS Raw Score   WNL Symptoms /Impairment     Mild Moderate Severe   Global Physical Health 15-20 13-14 9-12 4-8   Global Mental Health 14-20 11-13 7-10 4-6     Assessment and Plan  Anxiety. Pt reports stability in functioning since last [facility] visit 3 1/2 months ago. She has enrolled her daughter in pre-k,  will be starting next week. She continues to find benefit

## 2024-08-12 ENCOUNTER — HOSPITAL ENCOUNTER (OUTPATIENT)
Dept: PHYSICAL THERAPY | Age: 39
Setting detail: THERAPIES SERIES
Discharge: HOME | End: 2024-08-12
Payer: MEDICAID

## 2024-08-12 PROCEDURE — 97012 MECHANICAL TRACTION THERAPY: CPT

## 2024-08-12 PROCEDURE — 97110 THERAPEUTIC EXERCISES: CPT

## 2024-08-12 NOTE — FLOWSHEET NOTE
(51781)     Ther. Act (38321)    [facility]98402[facility] 1    Gait (93578)    Dry Needle 1-2 muscle (20560)     Aquatic Therex (60354)    Dry Needle 3+ muscle (20561)     Iontophoresis (72935)    VASO (47246)     Ultrasound (58435)    Group Therapy (96300)     Estim Attended (25905)    Canalith Repositioning (77671)     Other:    Other:    Total Timed Code Tx Minutes 25 2       Total Treatment Minutes 40        Charge Justification:  (33675) THERAPEUTIC EXERCISE - Provided verbal/tactile cueing for activities related to strengthening, flexibility, endurance, ROM performed to prevent loss of range of motion, maintain or improve muscular strength or increase flexibility, following either an injury or surgery.   (07740) NEUROMUSCULAR RE-EDUCATION - Therapeutic procedure, 1 or more areas, each 15 minutes; neuromuscular reeducation of movement, balance, coordination, kinesthetic sense, posture, and/or proprioception for sitting and/or standing activities  (57770) HOME EXERCISE PROGRAM - Reviewed/Progressed HEP activities related to neuromuscular reeducation of movement, balance, coordination, kinesthetic sense, posture, and/or proprioception for sitting and/or standing activities    (25402) MECHANICAL TRACTION    TREATMENT PLAN   Plan: Cont POC- Continue emphasis/focus on exercise progression, improving proper muscle recruitment and activation/motor control patterns, modulating pain, and promoting relaxation. Next visit plan to progress weights, progress reps, and add new exercises     Electronically Signed by Kena Elkins PT              Date: 08/12/2024     Note: If patient does not return for scheduled/recommended follow up visits, this note will serve as a discharge from care along with the most recent update on progress.

## 2024-08-14 ENCOUNTER — APPOINTMENT (OUTPATIENT)
Dept: PHYSICAL THERAPY | Age: 39
End: 2024-08-14
Payer: MEDICAID

## 2024-08-15 ENCOUNTER — TELEMEDICINE (OUTPATIENT)
Age: 39
End: 2024-08-15

## 2024-08-15 DIAGNOSIS — F41.9 ANXIETY: Primary | ICD-10-CM

## 2024-08-15 NOTE — PROGRESS NOTES
Behavioral Health Consultation   Cinthia Aguiar, PhD  Clinical Psychologist  8/15/2024      Time spent with Patient: 30 minutes 10:35-11:05  This is patient's 15 th  [facility] appointment.    Reason for Consult:   Chief Complaint   Patient presents with    Anxiety       Referring Provider: Zoey Starr MD    TELEHEALTH VISIT -- Audio/Visual    Kenia Vieyra was evaluated through a synchronous (real-time) audio-video encounter (via [employer]). The patient (or guardian, if applicable) is aware that this is a billable service, which includes applicable co-pays. This Virtual Visit was conducted with patient's (and/or legal guardian's) consent. Patient identification was verified, and a caregiver was present when appropriate. The patient was located in a state where the provider was licensed to provide care.     Conducted a risk-benefit analysis and determined that the patient's presenting problems are consistent with the use of telepsychology. Determined that the patient has sufficient knowledge and skills in the use of technology enabling them to adequately benefit from telepsychology. It was determined that this patient was able to be properly treated without an in-person session. Patient verified that they were currently located at the [place] address that was provided during registration or another address, if noted below.    Verified the following information:  Patient's identification: yes  Patient location: [address] [place] [address]  [address] [place] [address]   Patient's call back number: [phone]   Patient's emergency contact's name and number, as well as permission to contact them if needed: Extended Emergency Contact Information  Primary Emergency Contact: Marty Cisse  Address: [address] [place] [address] [place]  Home Phone: [phone]  Mobile Phone: [phone]  Relation: Spouse     Provider location: [place], [place]  Subjective  PT Improvement

## 2024-08-19 ENCOUNTER — HOSPITAL ENCOUNTER (OUTPATIENT)
Dept: PHYSICAL THERAPY | Age: 39
Setting detail: THERAPIES SERIES
Discharge: HOME | End: 2024-08-19
Payer: MEDICAID

## 2024-08-19 PROCEDURE — 97110 THERAPEUTIC EXERCISES: CPT

## 2024-08-19 PROCEDURE — 97012 MECHANICAL TRACTION THERAPY: CPT

## 2024-08-19 NOTE — FLOWSHEET NOTE
Aquatic Therex (79250)    Dry Needle 3+ muscle (96429)     Iontophoresis (56974)    VASO (97244)     Ultrasound (25563)    Group Therapy (78440)     Estim Attended (46901)    Canalith Repositioning (02221)     Other:    Other:    Total Timed Code Tx Minutes 25 2       Total Treatment Minutes 40        Charge Justification:  (40213) THERAPEUTIC EXERCISE - Provided verbal/tactile cueing for activities related to strengthening, flexibility, endurance, ROM performed to prevent loss of range of motion, maintain or improve muscular strength or increase flexibility, following either an injury or surgery.   (04860) NEUROMUSCULAR RE-EDUCATION - Therapeutic procedure, 1 or more areas, each 15 minutes; neuromuscular reeducation of movement, balance, coordination, kinesthetic sense, posture, and/or proprioception for sitting and/or standing activities  (98601) HOME EXERCISE PROGRAM - Reviewed/Progressed HEP activities related to neuromuscular reeducation of movement, balance, coordination, kinesthetic sense, posture, and/or proprioception for sitting and/or standing activities    (49595) MECHANICAL TRACTION    TREATMENT PLAN   Plan: Cont POC- Continue emphasis/focus on exercise progression, improving proper muscle recruitment and activation/motor control patterns, modulating pain, and promoting relaxation. Next visit plan to progress weights, progress reps, and add new exercises     Electronically Signed by Kena Elkins PT              Date: 08/19/2024     Note: If patient does not return for scheduled/recommended follow up visits, this note will serve as a discharge from care along with the most recent update on progress.

## 2024-08-21 ENCOUNTER — APPOINTMENT (OUTPATIENT)
Dept: PHYSICAL THERAPY | Age: 39
End: 2024-08-21
Payer: MEDICAID

## 2024-08-26 ENCOUNTER — HOSPITAL ENCOUNTER (OUTPATIENT)
Dept: PHYSICAL THERAPY | Age: 39
Setting detail: THERAPIES SERIES
Discharge: HOME | End: 2024-08-26
Payer: MEDICAID

## 2024-08-26 PROCEDURE — 97110 THERAPEUTIC EXERCISES: CPT

## 2024-08-26 PROCEDURE — 97012 MECHANICAL TRACTION THERAPY: CPT

## 2024-08-26 NOTE — FLOWSHEET NOTE
[facility]- Outpatient Rehabilitation and Therapy [address], [place] [address] office: [phone] fax: [phone]    Physical Therapy: TREATMENT/PROGRESS NOTE   Patient: Kenia Vieyra (38 y.o. female)   Treatment Date: 2024   :  1985 MRN: [id number]   Visit #: 8  Insurance Allowable Auth Needed **    16 visits ( - ) [x]Yes    []No   No visits approved yet however pt opted to continue with treatment this visit Insurance: Payor: [employer] [place] / Plan: [employer] [place] / Product Type: *No Product type* /   Insurance ID: [id number] - (Medicaid Managed)  Secondary Insurance (if applicable):    Treatment Diagnosis:     ICD-10-CM    1. Chronic bilateral low back pain, unspecified whether sciatica present  M54.50     G89.29          Medical Diagnosis:    Chronic thoracic back pain, unspecified back pain laterality [M54.6, G89.29]  Scoliosis of thoracic spine, unspecified scoliosis type [M41.9]   Referring Physician: Skyla Bran,*  PCP: Zoey Starr MD                             Plan of care signed (Y/N): Y    Date of Patient follow up with Physician: prn     Progress Report/POC: NO   POC update due: (10 visits /OR AUTH LIMITS, whichever is less)  24 NEXT VISIT    Precautions/ Contra-indications:                                                                                          Latex allergy:  NO  Pacemaker:    NO  Contraindications for Manipulation: None  Date of Surgery: n/a  Other:     Preferred Language for Healthcare:   [x]English       []other:    SUBJECTIVE EXAMINATION     Patient Report/Comments: Pt was in a flare up all week and feels very tired. Pt notes her whole right side was irritated but still feels benefit in that she is moving her body more than before. Pt notes she felt a lot of pain radiating into her head and down into her toe. She took gabapentin and muscle relaxer today. She sees  return for scheduled/recommended follow up visits, this note will serve as a discharge from care along with the most recent update on progress.

## 2024-09-04 ENCOUNTER — APPOINTMENT (OUTPATIENT)
Dept: PHYSICAL THERAPY | Age: 39
End: 2024-09-04
Payer: MEDICAID

## 2024-09-09 ENCOUNTER — HOSPITAL ENCOUNTER (OUTPATIENT)
Dept: PHYSICAL THERAPY | Age: 39
Setting detail: THERAPIES SERIES
Discharge: HOME | End: 2024-09-09
Payer: MEDICAID

## 2024-09-09 DIAGNOSIS — G47.09 OTHER INSOMNIA: ICD-10-CM

## 2024-09-09 DIAGNOSIS — M79.7 FIBROMYALGIA: ICD-10-CM

## 2024-09-09 DIAGNOSIS — F41.0 GENERALIZED ANXIETY DISORDER WITH PANIC ATTACKS: ICD-10-CM

## 2024-09-09 DIAGNOSIS — F41.1 GENERALIZED ANXIETY DISORDER WITH PANIC ATTACKS: ICD-10-CM

## 2024-09-09 DIAGNOSIS — F33.1 MDD (MAJOR DEPRESSIVE DISORDER), RECURRENT EPISODE, MODERATE (HCC): ICD-10-CM

## 2024-09-09 PROCEDURE — 97110 THERAPEUTIC EXERCISES: CPT

## 2024-09-09 PROCEDURE — 97112 NEUROMUSCULAR REEDUCATION: CPT

## 2024-09-09 RX ORDER — AMITRIPTYLINE HYDROCHLORIDE 10 MG/1
TABLET ORAL
Qty: 90 TABLET | Refills: 1 | Status: SHIPPED | OUTPATIENT
Start: 2024-09-09

## 2024-09-16 ENCOUNTER — HOSPITAL ENCOUNTER (OUTPATIENT)
Dept: PHYSICAL THERAPY | Age: 39
Setting detail: THERAPIES SERIES
End: 2024-09-16
Payer: MEDICAID

## 2024-09-23 ENCOUNTER — HOSPITAL ENCOUNTER (OUTPATIENT)
Dept: PHYSICAL THERAPY | Age: 39
Setting detail: THERAPIES SERIES
Discharge: HOME | End: 2024-09-23
Payer: MEDICAID

## 2024-09-23 PROCEDURE — 97110 THERAPEUTIC EXERCISES: CPT

## 2024-09-23 PROCEDURE — 97012 MECHANICAL TRACTION THERAPY: CPT

## 2024-09-23 PROCEDURE — 97112 NEUROMUSCULAR REEDUCATION: CPT

## 2024-09-30 ENCOUNTER — HOSPITAL ENCOUNTER (OUTPATIENT)
Dept: PHYSICAL THERAPY | Age: 39
Setting detail: THERAPIES SERIES
Discharge: HOME | End: 2024-09-30
Payer: MEDICAID

## 2024-09-30 PROCEDURE — 97110 THERAPEUTIC EXERCISES: CPT

## 2024-09-30 PROCEDURE — 97012 MECHANICAL TRACTION THERAPY: CPT

## 2024-09-30 PROCEDURE — 97140 MANUAL THERAPY 1/> REGIONS: CPT

## 2024-09-30 NOTE — FLOWSHEET NOTE
face-to-face)     Neuromusc. Re-ed (78628) 10 1  Re-Eval (63105)     Manual (46553)    Estim Unattended (92440)     Ther. Act (81494)    Mech. Traction (53344)     Gait (70200)    Dry Needle 1-2 muscle (07351)     Aquatic Therex (63981)    Dry Needle 3+ muscle (20561)     Iontophoresis (10820)    VASO (21872)     Ultrasound (98377)    Group Therapy (77957)     Estim Attended (07166)    Canalith Repositioning (80220)     Other:    Other:    Total Timed Code Tx Minutes 30 2  15     Total Treatment Minutes 45        Charge Justification:  (04374) THERAPEUTIC EXERCISE - Provided verbal/tactile cueing for activities related to strengthening, flexibility, endurance, ROM performed to prevent loss of range of motion, maintain or improve muscular strength or increase flexibility, following either an injury or surgery.   (92911) NEUROMUSCULAR RE-EDUCATION - Therapeutic procedure, 1 or more areas, each 15 minutes; neuromuscular reeducation of movement, balance, coordination, kinesthetic sense, posture, and/or proprioception for sitting and/or standing activities  (71647) HOME EXERCISE PROGRAM - Reviewed/Progressed HEP activities related to neuromuscular reeducation of movement, balance, coordination, kinesthetic sense, posture, and/or proprioception for sitting and/or standing activities    (73223) MECHANICAL TRACTION    TREATMENT PLAN   Plan: Cont POC- Continue emphasis/focus on exercise progression, improving proper muscle recruitment and activation/motor control patterns, modulating pain, and promoting relaxation. Next visit plan to progress weights and progress reps     Electronically Signed by Kena Elkins PT              Date: 09/30/2024     Note: If patient does not return for scheduled/recommended follow up visits, this note will serve as a discharge from care along with the most recent update on progress.

## 2024-10-06 DIAGNOSIS — F41.1 GENERALIZED ANXIETY DISORDER WITH PANIC ATTACKS: ICD-10-CM

## 2024-10-06 DIAGNOSIS — F41.0 GENERALIZED ANXIETY DISORDER WITH PANIC ATTACKS: ICD-10-CM

## 2024-10-06 DIAGNOSIS — M79.7 FIBROMYALGIA: ICD-10-CM

## 2024-10-06 DIAGNOSIS — G47.09 OTHER INSOMNIA: ICD-10-CM

## 2024-10-06 DIAGNOSIS — F33.1 MDD (MAJOR DEPRESSIVE DISORDER), RECURRENT EPISODE, MODERATE (HCC): ICD-10-CM

## 2024-10-07 RX ORDER — AMITRIPTYLINE HYDROCHLORIDE 10 MG/1
TABLET ORAL
Qty: 90 TABLET | Refills: 1 | OUTPATIENT
Start: 2024-10-07

## 2024-10-07 NOTE — TELEPHONE ENCOUNTER
Medication:   Requested Prescriptions     Pending Prescriptions Disp Refills    amitriptyline (ELAVIL) 10 MG tablet [Pharmacy Med Name: amitriptyline 10 mg tablet] 90 tablet 1     Sig: TAKE ONE TABLET BY MOUTH EVERY MORNING and TAKE TWO TABLETS BY MOUTH AT BEDTIME        Last Filled:  9/9/2024    Patient Phone Number: [phone] (home)     Last appt: 7/24/2024   Next appt: Visit date not found    Last OARRS:        No data to display                  Duplicate request.

## 2024-10-22 ENCOUNTER — PATIENT MESSAGE (OUTPATIENT)
Dept: FAMILY MEDICINE CLINIC | Age: 39
End: 2024-10-22

## 2024-10-22 DIAGNOSIS — E55.9 VITAMIN D DEFICIENCY: ICD-10-CM

## 2024-10-23 ENCOUNTER — HOSPITAL ENCOUNTER (OUTPATIENT)
Dept: PHYSICAL THERAPY | Age: 39
Setting detail: THERAPIES SERIES
Discharge: HOME | End: 2024-10-23
Payer: MEDICAID

## 2024-10-23 PROCEDURE — 97110 THERAPEUTIC EXERCISES: CPT

## 2024-10-23 PROCEDURE — 97140 MANUAL THERAPY 1/> REGIONS: CPT

## 2024-10-23 PROCEDURE — 97012 MECHANICAL TRACTION THERAPY: CPT

## 2024-10-23 NOTE — FLOWSHEET NOTE
face-to-face)     Neuromusc. Re-ed (53317) 10 1  Re-Eval (16562)     Manual (87049)    Estim Unattended (66379)     Ther. Act (47786)    Mech. Traction (33824)     Gait (62037)    Dry Needle 1-2 muscle (16625)     Aquatic Therex (74486)    Dry Needle 3+ muscle (20561)     Iontophoresis (65236)    VASO (57895)     Ultrasound (55685)    Group Therapy (75709)     Estim Attended (34088)    Canalith Repositioning (58264)     Other:    Other:    Total Timed Code Tx Minutes 30 2  15     Total Treatment Minutes 45        Charge Justification:  (39888) THERAPEUTIC EXERCISE - Provided verbal/tactile cueing for activities related to strengthening, flexibility, endurance, ROM performed to prevent loss of range of motion, maintain or improve muscular strength or increase flexibility, following either an injury or surgery.   (72180) NEUROMUSCULAR RE-EDUCATION - Therapeutic procedure, 1 or more areas, each 15 minutes; neuromuscular reeducation of movement, balance, coordination, kinesthetic sense, posture, and/or proprioception for sitting and/or standing activities  (75620) HOME EXERCISE PROGRAM - Reviewed/Progressed HEP activities related to neuromuscular reeducation of movement, balance, coordination, kinesthetic sense, posture, and/or proprioception for sitting and/or standing activities    (44475) MECHANICAL TRACTION    TREATMENT PLAN   Plan: Cont POC- Continue emphasis/focus on exercise progression, improving proper muscle recruitment and activation/motor control patterns, modulating pain, and promoting relaxation. Next visit plan to progress weights and progress reps     Electronically Signed by Kena Elkins PT              Date: 10/23/2024     Note: If patient does not return for scheduled/recommended follow up visits, this note will serve as a discharge from care along with the most recent update on progress.

## 2024-10-24 ENCOUNTER — TELEPHONE (OUTPATIENT)
Dept: ORTHOPEDIC SURGERY | Age: 39
End: 2024-10-24

## 2024-10-24 RX ORDER — CHOLECALCIFEROL (VITAMIN D3) 50 MCG
TABLET ORAL
Qty: 30 TABLET | Refills: 5 | Status: SHIPPED | OUTPATIENT
Start: 2024-10-24

## 2024-10-24 NOTE — TELEPHONE ENCOUNTER
Medication:   Requested Prescriptions     Pending Prescriptions Disp Refills    Cholecalciferol (VITAMIN D3) 50 MCG ( UT) TABS 30 tablet 5     Si Tablet by mouth daily        Last Filled:  2023     Patient Phone Number: [phone] (home)     Last appt: 2024   Next appt: Visit date not found    Last OARRS:        No data to display              MA sent message back via patient [employer] system informing them that they will need to schedule nure/MA visit to have vaccination completed.

## 2024-10-24 NOTE — TELEPHONE ENCOUNTER
L/M for the patient informing her we had received a message from Kena in physical therapy regarding the patient's continuing pain and wanting to continue PT. She was instructed to schedule a follow-up appointment with Skyla Bran PA-C for re-evaluation. She may call back and schedule the appointment at her convenience.

## 2024-10-28 ENCOUNTER — HOSPITAL ENCOUNTER (OUTPATIENT)
Dept: PHYSICAL THERAPY | Age: 39
Setting detail: THERAPIES SERIES
Discharge: HOME | End: 2024-10-28
Payer: MEDICAID

## 2024-10-28 PROCEDURE — 97012 MECHANICAL TRACTION THERAPY: CPT

## 2024-10-28 PROCEDURE — 97110 THERAPEUTIC EXERCISES: CPT

## 2024-10-28 PROCEDURE — 97112 NEUROMUSCULAR REEDUCATION: CPT

## 2024-10-28 NOTE — FLOWSHEET NOTE
[facility] [address], [place] [address] office: [phone] fax: [phone]      Physical Therapy: TREATMENT/PROGRESS NOTE   Patient: Kenia Vieyra (38 y.o. female)   Treatment Date: 10/28/2024   :  1985 MRN: [id number]   Visit #: 12 (4 used of 6)  Insurance Allowable Auth Needed **   6 visits ( - ) [x]Yes    []No   No visits approved however pt opted to continue this visit Insurance: Payor: [employer] / Plan: [employer] [place]AN [place] / Product Type: *No Product type* /   Insurance ID: [id number] - (Medicaid Managed)  Secondary Insurance (if applicable):    Treatment Diagnosis:     ICD-10-CM    1. Chronic bilateral low back pain, unspecified whether sciatica present  M54.50     G89.29          Medical Diagnosis:    Chronic thoracic back pain, unspecified back pain laterality [M54.6, G89.29]  Scoliosis of thoracic spine, unspecified scoliosis type [M41.9]   Referring Physician: Skyla Bran,*  PCP: Zoey Starr MD                             Plan of care signed (Y/N): Y    Date of Patient follow up with Physician: prn     Progress Report/POC: NO   POC update due: (10 visits /OR AUTH LIMITS, whichever is less)  NEXT VISIT    Precautions/ Contra-indications:                                                                                          Latex allergy:  NO  Pacemaker:    NO  Contraindications for Manipulation: None  Date of Surgery: n/a  Other:     Preferred Language for Healthcare:   [x]English       []other:    SUBJECTIVE EXAMINATION     AUTH REQUIRED    Patient Report/Comments: Pt states she feels ok right now. Pt is still not sleeping well noting she never feels she gets deep sleep. She also feels sleep is heavily impacted by hormones around her menstrual cycle.      Test used Initial score  12/6/23 10/28/2024   Pain Summary VAS 4/10 3-4/10   Functional questionnaire

## 2024-10-29 ENCOUNTER — OFFICE VISIT (OUTPATIENT)
Dept: ORTHOPEDIC SURGERY | Age: 39
End: 2024-10-29
Payer: MEDICAID

## 2024-10-29 VITALS — BODY MASS INDEX: 28.66 KG/M2 | HEIGHT: 60 IN | WEIGHT: 146 LBS

## 2024-10-29 DIAGNOSIS — M79.18 MYOFASCIAL PAIN: ICD-10-CM

## 2024-10-29 DIAGNOSIS — M54.12 CERVICAL RADICULITIS: ICD-10-CM

## 2024-10-29 DIAGNOSIS — M50.20 PROTRUSION OF CERVICAL INTERVERTEBRAL DISC: ICD-10-CM

## 2024-10-29 DIAGNOSIS — G89.29 CHRONIC NECK PAIN: Primary | ICD-10-CM

## 2024-10-29 DIAGNOSIS — M54.2 CHRONIC NECK PAIN: Primary | ICD-10-CM

## 2024-10-29 PROCEDURE — 99214 OFFICE O/P EST MOD 30 MIN: CPT | Performed by: PHYSICIAN ASSISTANT

## 2024-10-29 PROCEDURE — G8419 CALC BMI OUT NRM PARAM NOF/U: HCPCS | Performed by: PHYSICIAN ASSISTANT

## 2024-10-29 PROCEDURE — 1036F TOBACCO NON-USER: CPT | Performed by: PHYSICIAN ASSISTANT

## 2024-10-29 PROCEDURE — G8484 FLU IMMUNIZE NO ADMIN: HCPCS | Performed by: PHYSICIAN ASSISTANT

## 2024-10-29 PROCEDURE — G8427 DOCREV CUR MEDS BY ELIG CLIN: HCPCS | Performed by: PHYSICIAN ASSISTANT

## 2024-10-29 NOTE — PROGRESS NOTES
FOLLOW UP: SPINE    10/29/2024     CHIEF COMPLAINT:    Chief Complaint   Patient presents with    Follow-up     CERVICAL & THORACIC SPINE        HISTORY OF PRESENT ILLNESS:              The patient is a 38 y.o. female history of depression, fibromyalgia here to for chronic neck and right UE pain.  She reports chronic underlying right-sided neck/trap pain radiating to her right upper extremity--typically triceps forearm to the last 3 digits.  Periodically she will experience some numbness and tingling in her right upper extremity.  Recent cervical MRI scan has shown right disc bulging C6-7.  She feels symptoms have been worsening over the last few years; however, PT has been greatly beneficial.  She also states that her pain at times will be cyclic with her menstrual cycle.  Her pain is constant but increased with prolonged positioning.  She does report some improvement with NSAIDs (diclofenac sparingly), PT and dry needling.  Conservative care includes extensive PT, gabapentin, Elavil, ibuprofen, Mobic, diclofenac.  She states she also saw a recent pain management provider Dr. Cruz.  She denies any progressive extremity weakness. She denies any clear-cut lower extremity radiating pain.  She denies any fine motor difficulty.  She denies any recent fevers chills or infections.  No recent injury or trauma.    The pain assessment was noted & reviewed in the medical record today.     Current/Past Treatment:   Physical Therapy: Yes since October 2023 to present. PT notes reviewed.  sessions  Chiropractic:     Injection:     Medications:            NSAIDS: Ibuprofen, Mobic, diclofenac--sparingly            Muscle relaxer:              Steriods:              Neuropathic medications: Gabapentin            Opioids: Elavil            Other: Topical from her rheumatologist  Surgery/Consult: no    Work Status/Functionality: N/A    Past Medical History: Medical history form was reviewed today & scanned into the media

## 2024-11-06 ENCOUNTER — HOSPITAL ENCOUNTER (OUTPATIENT)
Dept: PHYSICAL THERAPY | Age: 39
Setting detail: THERAPIES SERIES
Discharge: HOME | End: 2024-11-06
Payer: MEDICAID

## 2024-11-06 PROCEDURE — 97012 MECHANICAL TRACTION THERAPY: CPT

## 2024-11-06 PROCEDURE — 97110 THERAPEUTIC EXERCISES: CPT

## 2024-11-06 PROCEDURE — 97140 MANUAL THERAPY 1/> REGIONS: CPT

## 2024-11-06 NOTE — FLOWSHEET NOTE
deficits.              Status: [x] Progressing: [] Met: [] Not Met: [] Adjusted     Long Term Goals: To be achieved in:  12  weeks  Disability index score of 12% or less for the Modified Oswestry to assist with return top prior level of function.                     Status: [x] Progressing: [] Met: [] Not Met: [] Adjusted  LLE AROM = RLE AROM to allow for proper joint functioning as indicated by patients functional deficits.  Status: [x] Progressing: [] Met: [] Not Met: [] Adjusted  Pt to improve strength to 4+/5 or better of proximal hip and posterior chain Magaly allow for proper muscle and joint use in functional mobility, ADLs and prior level of function  Status: [x] Progressing: [] Met: [] Not Met: [] Adjusted  Patient will return to Usual recreational activities, lifting an object from the floor, light home activities, walk 1 mile, and stand for length of time without increased symptoms or restriction to work towards return to prior level of function.  Status: [x] Progressing: [] Met: [] Not Met: [] Adjusted  Patient will perform normal ADLs without symptoms 50% of the time                                                                                                                  Status: [x] Progressing: [] Met: [] Not Met: []      Overall Progression Towards Functional goals/ Treatment Progress Update:  [] Patient is progressing as expected towards functional goals listed.    [] Progression is slowed due to complexities/Impairments listed.  [] Progression has been slowed due to co-morbidities.  [x] Plan just implemented, too soon (<30days) to assess goals progression   [] Goals require adjustment due to lack of progress  [] Patient is not progressing as expected and requires additional follow up with physician  [] Other:     CHARGE CAPTURE     PT CHARGE GRID   CPT Code (TIMED) minutes # CPT Code (UNTIMED) #     Therex (01018)  20 1  EVAL:LOW (63313 - Typically 20 minutes face-to-face)     Neuromusc. Re-ed

## 2024-11-13 ENCOUNTER — APPOINTMENT (OUTPATIENT)
Dept: PHYSICAL THERAPY | Age: 39
End: 2024-11-13
Payer: MEDICAID

## 2024-11-20 ENCOUNTER — APPOINTMENT (OUTPATIENT)
Dept: PHYSICAL THERAPY | Age: 39
End: 2024-11-20
Payer: MEDICAID

## 2024-11-25 ENCOUNTER — APPOINTMENT (OUTPATIENT)
Dept: PHYSICAL THERAPY | Age: 39
End: 2024-11-25
Payer: MEDICAID

## 2025-09-05 ENCOUNTER — OFFICE VISIT (OUTPATIENT)
Dept: FAMILY MEDICINE CLINIC | Age: 40
End: 2025-09-05
Payer: MEDICAID

## 2025-09-05 VITALS
HEART RATE: 104 BPM | DIASTOLIC BLOOD PRESSURE: 74 MMHG | OXYGEN SATURATION: 98 % | WEIGHT: 136 LBS | BODY MASS INDEX: 26.7 KG/M2 | TEMPERATURE: 97.6 F | SYSTOLIC BLOOD PRESSURE: 126 MMHG | HEIGHT: 60 IN

## 2025-09-05 DIAGNOSIS — F41.0 PANIC: Primary | ICD-10-CM

## 2025-09-05 DIAGNOSIS — R00.2 PALPITATIONS: ICD-10-CM

## 2025-09-05 PROCEDURE — 1036F TOBACCO NON-USER: CPT | Performed by: NURSE PRACTITIONER

## 2025-09-05 PROCEDURE — G8427 DOCREV CUR MEDS BY ELIG CLIN: HCPCS | Performed by: NURSE PRACTITIONER

## 2025-09-05 PROCEDURE — 99214 OFFICE O/P EST MOD 30 MIN: CPT | Performed by: NURSE PRACTITIONER

## 2025-09-05 PROCEDURE — 3078F DIAST BP <80 MM HG: CPT | Performed by: NURSE PRACTITIONER

## 2025-09-05 PROCEDURE — G8419 CALC BMI OUT NRM PARAM NOF/U: HCPCS | Performed by: NURSE PRACTITIONER

## 2025-09-05 PROCEDURE — 3074F SYST BP LT 130 MM HG: CPT | Performed by: NURSE PRACTITIONER

## 2025-09-05 RX ORDER — ALPRAZOLAM 0.25 MG
0.25 TABLET ORAL DAILY PRN
Qty: 15 TABLET | Refills: 0 | Status: SHIPPED | OUTPATIENT
Start: 2025-09-05 | End: 2025-09-20

## 2025-09-05 SDOH — ECONOMIC STABILITY: FOOD INSECURITY: WITHIN THE PAST 12 MONTHS, THE FOOD YOU BOUGHT JUST DIDN'T LAST AND YOU DIDN'T HAVE MONEY TO GET MORE.: NEVER TRUE

## 2025-09-05 SDOH — ECONOMIC STABILITY: FOOD INSECURITY: WITHIN THE PAST 12 MONTHS, YOU WORRIED THAT YOUR FOOD WOULD RUN OUT BEFORE YOU GOT MONEY TO BUY MORE.: NEVER TRUE

## 2025-09-05 ASSESSMENT — PATIENT HEALTH QUESTIONNAIRE - PHQ9
2. FEELING DOWN, DEPRESSED OR HOPELESS: NOT AT ALL
SUM OF ALL RESPONSES TO PHQ QUESTIONS 1-9: 0
1. LITTLE INTEREST OR PLEASURE IN DOING THINGS: NOT AT ALL
SUM OF ALL RESPONSES TO PHQ QUESTIONS 1-9: 0